# Patient Record
Sex: MALE | Race: WHITE | Employment: OTHER | URBAN - NONMETROPOLITAN AREA
[De-identification: names, ages, dates, MRNs, and addresses within clinical notes are randomized per-mention and may not be internally consistent; named-entity substitution may affect disease eponyms.]

---

## 2017-01-12 RX ORDER — DESLORATADINE 5 MG/1
TABLET ORAL
Qty: 90 TABLET | Refills: 1 | Status: SHIPPED | OUTPATIENT
Start: 2017-01-12 | End: 2017-06-06 | Stop reason: SDUPTHER

## 2017-01-13 RX ORDER — GABAPENTIN 600 MG/1
600 TABLET ORAL EVERY EVENING
Qty: 90 TABLET | Refills: 1 | Status: SHIPPED | OUTPATIENT
Start: 2017-01-13 | End: 2017-06-06 | Stop reason: SDUPTHER

## 2017-01-13 RX ORDER — FUROSEMIDE 40 MG/1
TABLET ORAL
Qty: 90 TABLET | Refills: 1 | Status: SHIPPED | OUTPATIENT
Start: 2017-01-13 | End: 2017-06-06 | Stop reason: SDUPTHER

## 2017-02-27 RX ORDER — PAROXETINE HYDROCHLORIDE 40 MG/1
TABLET, FILM COATED ORAL
Qty: 90 TABLET | Refills: 1 | Status: SHIPPED | OUTPATIENT
Start: 2017-02-27 | End: 2017-08-26 | Stop reason: SDUPTHER

## 2017-03-07 RX ORDER — KETOCONAZOLE 20 MG/ML
SHAMPOO TOPICAL
Qty: 1 BOTTLE | Refills: 5 | Status: SHIPPED | OUTPATIENT
Start: 2017-03-07 | End: 2017-08-11 | Stop reason: SDUPTHER

## 2017-03-26 DIAGNOSIS — K21.9 GASTROESOPHAGEAL REFLUX DISEASE WITHOUT ESOPHAGITIS: ICD-10-CM

## 2017-03-27 RX ORDER — PANTOPRAZOLE SODIUM 40 MG/1
TABLET, DELAYED RELEASE ORAL
Qty: 180 TABLET | Refills: 0 | Status: SHIPPED | OUTPATIENT
Start: 2017-03-27 | End: 2017-06-06 | Stop reason: SDUPTHER

## 2017-05-10 RX ORDER — LISINOPRIL 2.5 MG/1
2.5 TABLET ORAL DAILY
Qty: 90 TABLET | Refills: 1 | Status: SHIPPED | OUTPATIENT
Start: 2017-05-10 | End: 2017-08-14 | Stop reason: SDUPTHER

## 2017-06-06 ENCOUNTER — OFFICE VISIT (OUTPATIENT)
Dept: FAMILY MEDICINE CLINIC | Age: 77
End: 2017-06-06
Payer: MEDICARE

## 2017-06-06 VITALS
DIASTOLIC BLOOD PRESSURE: 60 MMHG | HEART RATE: 76 BPM | HEIGHT: 71 IN | WEIGHT: 270 LBS | BODY MASS INDEX: 37.8 KG/M2 | SYSTOLIC BLOOD PRESSURE: 110 MMHG | RESPIRATION RATE: 16 BRPM

## 2017-06-06 DIAGNOSIS — N18.30 TYPE 2 DIABETES MELLITUS WITH STAGE 3 CHRONIC KIDNEY DISEASE, WITHOUT LONG-TERM CURRENT USE OF INSULIN (HCC): ICD-10-CM

## 2017-06-06 DIAGNOSIS — N40.0 BENIGN NON-NODULAR PROSTATIC HYPERPLASIA WITHOUT LOWER URINARY TRACT SYMPTOMS: ICD-10-CM

## 2017-06-06 DIAGNOSIS — N18.30 CHRONIC KIDNEY DISEASE (CKD), STAGE III (MODERATE) (HCC): ICD-10-CM

## 2017-06-06 DIAGNOSIS — G63 POLYNEUROPATHY ASSOCIATED WITH UNDERLYING DISEASE (HCC): ICD-10-CM

## 2017-06-06 DIAGNOSIS — F41.9 ANXIETY: ICD-10-CM

## 2017-06-06 DIAGNOSIS — I42.0 DILATED CARDIOMYOPATHY (HCC): ICD-10-CM

## 2017-06-06 DIAGNOSIS — I10 ESSENTIAL HYPERTENSION: ICD-10-CM

## 2017-06-06 DIAGNOSIS — E11.22 TYPE 2 DIABETES MELLITUS WITH STAGE 3 CHRONIC KIDNEY DISEASE, WITHOUT LONG-TERM CURRENT USE OF INSULIN (HCC): ICD-10-CM

## 2017-06-06 DIAGNOSIS — K21.9 GASTROESOPHAGEAL REFLUX DISEASE WITHOUT ESOPHAGITIS: Primary | ICD-10-CM

## 2017-06-06 DIAGNOSIS — F32.A DEPRESSION, UNSPECIFIED DEPRESSION TYPE: ICD-10-CM

## 2017-06-06 PROCEDURE — 4040F PNEUMOC VAC/ADMIN/RCVD: CPT | Performed by: FAMILY MEDICINE

## 2017-06-06 PROCEDURE — 1123F ACP DISCUSS/DSCN MKR DOCD: CPT | Performed by: FAMILY MEDICINE

## 2017-06-06 PROCEDURE — G8417 CALC BMI ABV UP PARAM F/U: HCPCS | Performed by: FAMILY MEDICINE

## 2017-06-06 PROCEDURE — 1036F TOBACCO NON-USER: CPT | Performed by: FAMILY MEDICINE

## 2017-06-06 PROCEDURE — 99214 OFFICE O/P EST MOD 30 MIN: CPT | Performed by: FAMILY MEDICINE

## 2017-06-06 PROCEDURE — G8427 DOCREV CUR MEDS BY ELIG CLIN: HCPCS | Performed by: FAMILY MEDICINE

## 2017-06-06 PROCEDURE — G8598 ASA/ANTIPLAT THER USED: HCPCS | Performed by: FAMILY MEDICINE

## 2017-06-06 RX ORDER — GABAPENTIN 600 MG/1
600 TABLET ORAL EVERY EVENING
Qty: 90 TABLET | Refills: 1 | Status: SHIPPED | OUTPATIENT
Start: 2017-06-06 | End: 2018-02-06 | Stop reason: SDUPTHER

## 2017-06-06 RX ORDER — DESLORATADINE 5 MG/1
TABLET ORAL
Qty: 90 TABLET | Refills: 1 | Status: SHIPPED | OUTPATIENT
Start: 2017-06-06 | End: 2017-12-16 | Stop reason: SDUPTHER

## 2017-06-06 RX ORDER — FUROSEMIDE 40 MG/1
TABLET ORAL
Qty: 90 TABLET | Refills: 1 | Status: SHIPPED | OUTPATIENT
Start: 2017-06-06 | End: 2018-01-24 | Stop reason: SDUPTHER

## 2017-06-06 RX ORDER — PANTOPRAZOLE SODIUM 40 MG/1
TABLET, DELAYED RELEASE ORAL
Qty: 180 TABLET | Refills: 0 | Status: SHIPPED | OUTPATIENT
Start: 2017-06-06 | End: 2017-09-05 | Stop reason: SDUPTHER

## 2017-06-06 RX ORDER — HYDROCODONE BITARTRATE AND ACETAMINOPHEN 5; 325 MG/1; MG/1
TABLET ORAL
Qty: 90 TABLET | Refills: 0 | Status: SHIPPED | OUTPATIENT
Start: 2017-06-06 | End: 2017-12-06 | Stop reason: SDUPTHER

## 2017-06-06 RX ORDER — SOTALOL HYDROCHLORIDE 80 MG/1
TABLET ORAL
Qty: 180 TABLET | Refills: 1 | Status: SHIPPED | OUTPATIENT
Start: 2017-06-06 | End: 2017-12-04 | Stop reason: SDUPTHER

## 2017-06-06 ASSESSMENT — ENCOUNTER SYMPTOMS
ABDOMINAL PAIN: 0
SINUS PRESSURE: 0
RHINORRHEA: 0
EYE REDNESS: 0
EYE DISCHARGE: 0
DIARRHEA: 0
CONSTIPATION: 0
VOMITING: 0
SHORTNESS OF BREATH: 0
SORE THROAT: 0
TROUBLE SWALLOWING: 0
COUGH: 0
WHEEZING: 0
NAUSEA: 0

## 2017-06-06 ASSESSMENT — PATIENT HEALTH QUESTIONNAIRE - PHQ9
SUM OF ALL RESPONSES TO PHQ9 QUESTIONS 1 & 2: 0
1. LITTLE INTEREST OR PLEASURE IN DOING THINGS: 0
SUM OF ALL RESPONSES TO PHQ QUESTIONS 1-9: 0
2. FEELING DOWN, DEPRESSED OR HOPELESS: 0

## 2017-06-07 RX ORDER — GABAPENTIN 300 MG/1
300 CAPSULE ORAL
Qty: 90 CAPSULE | Refills: 0 | Status: SHIPPED | OUTPATIENT
Start: 2017-06-07 | End: 2017-10-18 | Stop reason: SDUPTHER

## 2017-06-13 ENCOUNTER — TELEPHONE (OUTPATIENT)
Dept: FAMILY MEDICINE CLINIC | Age: 77
End: 2017-06-13

## 2017-06-14 ENCOUNTER — OFFICE VISIT (OUTPATIENT)
Dept: UROLOGY | Age: 77
End: 2017-06-14
Payer: MEDICARE

## 2017-06-14 VITALS
DIASTOLIC BLOOD PRESSURE: 60 MMHG | WEIGHT: 267 LBS | BODY MASS INDEX: 37.38 KG/M2 | HEIGHT: 71 IN | SYSTOLIC BLOOD PRESSURE: 110 MMHG | HEART RATE: 80 BPM

## 2017-06-14 DIAGNOSIS — R97.20 ABNORMAL PSA: Primary | ICD-10-CM

## 2017-06-14 PROCEDURE — 4040F PNEUMOC VAC/ADMIN/RCVD: CPT | Performed by: UROLOGY

## 2017-06-14 PROCEDURE — 99213 OFFICE O/P EST LOW 20 MIN: CPT | Performed by: UROLOGY

## 2017-06-14 PROCEDURE — G8598 ASA/ANTIPLAT THER USED: HCPCS | Performed by: UROLOGY

## 2017-06-14 PROCEDURE — G8427 DOCREV CUR MEDS BY ELIG CLIN: HCPCS | Performed by: UROLOGY

## 2017-06-14 PROCEDURE — G8417 CALC BMI ABV UP PARAM F/U: HCPCS | Performed by: UROLOGY

## 2017-06-14 PROCEDURE — 1036F TOBACCO NON-USER: CPT | Performed by: UROLOGY

## 2017-06-14 PROCEDURE — 1123F ACP DISCUSS/DSCN MKR DOCD: CPT | Performed by: UROLOGY

## 2017-08-11 RX ORDER — KETOCONAZOLE 20 MG/ML
SHAMPOO TOPICAL
Qty: 1 BOTTLE | Refills: 5 | Status: SHIPPED | OUTPATIENT
Start: 2017-08-11 | End: 2018-11-16

## 2017-08-14 RX ORDER — LISINOPRIL 2.5 MG/1
2.5 TABLET ORAL DAILY
Qty: 30 TABLET | Refills: 0 | Status: SHIPPED | OUTPATIENT
Start: 2017-08-14 | End: 2017-10-24

## 2017-08-28 RX ORDER — PAROXETINE HYDROCHLORIDE 40 MG/1
TABLET, FILM COATED ORAL
Qty: 90 TABLET | Refills: 1 | Status: SHIPPED | OUTPATIENT
Start: 2017-08-28 | End: 2018-02-24 | Stop reason: SDUPTHER

## 2017-09-05 DIAGNOSIS — K21.9 GASTROESOPHAGEAL REFLUX DISEASE WITHOUT ESOPHAGITIS: ICD-10-CM

## 2017-09-05 RX ORDER — PANTOPRAZOLE SODIUM 40 MG/1
TABLET, DELAYED RELEASE ORAL
Qty: 180 TABLET | Refills: 1 | Status: SHIPPED | OUTPATIENT
Start: 2017-09-05 | End: 2018-03-04 | Stop reason: SDUPTHER

## 2017-10-18 RX ORDER — GABAPENTIN 300 MG/1
300 CAPSULE ORAL
Qty: 90 CAPSULE | Refills: 0 | Status: SHIPPED | OUTPATIENT
Start: 2017-10-18 | End: 2018-02-06 | Stop reason: SDUPTHER

## 2017-10-24 RX ORDER — LISINOPRIL 2.5 MG/1
TABLET ORAL
Qty: 90 TABLET | Refills: 1 | Status: SHIPPED | OUTPATIENT
Start: 2017-10-24 | End: 2018-04-22 | Stop reason: SDUPTHER

## 2017-12-04 ENCOUNTER — HOSPITAL ENCOUNTER (OUTPATIENT)
Dept: LAB | Age: 77
Setting detail: SPECIMEN
Discharge: HOME | End: 2017-12-04
Payer: MEDICARE

## 2017-12-04 DIAGNOSIS — N18.30 TYPE 2 DIABETES MELLITUS WITH STAGE 3 CHRONIC KIDNEY DISEASE, WITHOUT LONG-TERM CURRENT USE OF INSULIN (HCC): ICD-10-CM

## 2017-12-04 DIAGNOSIS — R97.20 ABNORMAL PSA: ICD-10-CM

## 2017-12-04 DIAGNOSIS — N40.0 BENIGN NON-NODULAR PROSTATIC HYPERPLASIA WITHOUT LOWER URINARY TRACT SYMPTOMS: ICD-10-CM

## 2017-12-04 DIAGNOSIS — E11.22 TYPE 2 DIABETES MELLITUS WITH STAGE 3 CHRONIC KIDNEY DISEASE, WITHOUT LONG-TERM CURRENT USE OF INSULIN (HCC): ICD-10-CM

## 2017-12-04 DIAGNOSIS — R97.20 ELEVATED PSA: Primary | ICD-10-CM

## 2017-12-04 LAB
ABSOLUTE EOS #: 0.4 K/UL (ref 0–0.4)
ABSOLUTE IMMATURE GRANULOCYTE: ABNORMAL K/UL (ref 0–0.3)
ABSOLUTE LYMPH #: 0.9 K/UL (ref 1–4.8)
ABSOLUTE MONO #: 0.4 K/UL (ref 0.1–1.2)
ALBUMIN SERPL-MCNC: 4 G/DL (ref 3.5–5.2)
ALBUMIN/GLOBULIN RATIO: 1.6 (ref 1–2.5)
ALP BLD-CCNC: 99 U/L (ref 40–129)
ALT SERPL-CCNC: 22 U/L (ref 5–41)
ANION GAP SERPL CALCULATED.3IONS-SCNC: 13 MMOL/L (ref 9–17)
AST SERPL-CCNC: 21 U/L
BASOPHILS # BLD: 1 % (ref 0–2)
BASOPHILS ABSOLUTE: 0.1 K/UL (ref 0–0.2)
BILIRUB SERPL-MCNC: 0.35 MG/DL (ref 0.3–1.2)
BUN BLDV-MCNC: 23 MG/DL (ref 8–23)
BUN/CREAT BLD: 16 (ref 9–20)
CALCIUM SERPL-MCNC: 8.1 MG/DL (ref 8.6–10.4)
CHLORIDE BLD-SCNC: 108 MMOL/L (ref 98–107)
CHOLESTEROL/HDL RATIO: 4.3
CHOLESTEROL: 113 MG/DL
CO2: 24 MMOL/L (ref 20–31)
CREAT SERPL-MCNC: 1.4 MG/DL (ref 0.7–1.2)
DIFFERENTIAL TYPE: ABNORMAL
EOSINOPHILS RELATIVE PERCENT: 6 % (ref 1–8)
ESTIMATED AVERAGE GLUCOSE: 146 MG/DL
GFR AFRICAN AMERICAN: 59 ML/MIN
GFR NON-AFRICAN AMERICAN: 49 ML/MIN
GFR SERPL CREATININE-BSD FRML MDRD: ABNORMAL ML/MIN/{1.73_M2}
GFR SERPL CREATININE-BSD FRML MDRD: ABNORMAL ML/MIN/{1.73_M2}
GLUCOSE BLD-MCNC: 150 MG/DL (ref 70–99)
HBA1C MFR BLD: 6.7 % (ref 4.8–5.9)
HCT VFR BLD CALC: 39.2 % (ref 41–53)
HDLC SERPL-MCNC: 26 MG/DL
HEMOGLOBIN: 12.7 G/DL (ref 13.5–17.5)
IMMATURE GRANULOCYTES: ABNORMAL %
LDL CHOLESTEROL: 65 MG/DL (ref 0–130)
LYMPHOCYTES # BLD: 12 % (ref 15–43)
MCH RBC QN AUTO: 30.5 PG (ref 26–34)
MCHC RBC AUTO-ENTMCNC: 32.5 G/DL (ref 31–37)
MCV RBC AUTO: 93.8 FL (ref 80–100)
MONOCYTES # BLD: 6 % (ref 6–14)
PDW BLD-RTO: 14.4 % (ref 11–14.5)
PLATELET # BLD: 156 K/UL (ref 140–450)
PLATELET ESTIMATE: ABNORMAL
PMV BLD AUTO: 8.4 FL (ref 6–12)
POTASSIUM SERPL-SCNC: 4.2 MMOL/L (ref 3.7–5.3)
PROSTATE SPECIFIC ANTIGEN: 6.01 UG/L
RBC # BLD: 4.18 M/UL (ref 4.5–5.9)
RBC # BLD: ABNORMAL 10*6/UL
SEG NEUTROPHILS: 75 % (ref 44–74)
SEGMENTED NEUTROPHILS ABSOLUTE COUNT: 5.2 K/UL (ref 1.8–7.7)
SODIUM BLD-SCNC: 145 MMOL/L (ref 135–144)
TOTAL PROTEIN: 6.5 G/DL (ref 6.4–8.3)
TRIGL SERPL-MCNC: 108 MG/DL
VLDLC SERPL CALC-MCNC: ABNORMAL MG/DL (ref 1–30)
WBC # BLD: 7 K/UL (ref 3.5–11)
WBC # BLD: ABNORMAL 10*3/UL

## 2017-12-04 PROCEDURE — 80061 LIPID PANEL: CPT

## 2017-12-04 PROCEDURE — 83036 HEMOGLOBIN GLYCOSYLATED A1C: CPT

## 2017-12-04 PROCEDURE — 36415 COLL VENOUS BLD VENIPUNCTURE: CPT

## 2017-12-04 PROCEDURE — 85025 COMPLETE CBC W/AUTO DIFF WBC: CPT

## 2017-12-04 PROCEDURE — 84153 ASSAY OF PSA TOTAL: CPT

## 2017-12-04 PROCEDURE — 80053 COMPREHEN METABOLIC PANEL: CPT

## 2017-12-04 RX ORDER — SOTALOL HYDROCHLORIDE 80 MG/1
TABLET ORAL
Qty: 180 TABLET | Refills: 1 | Status: SHIPPED | OUTPATIENT
Start: 2017-12-04 | End: 2018-06-15 | Stop reason: SDUPTHER

## 2017-12-05 NOTE — TELEPHONE ENCOUNTER
Ciera Lenz called requesting a refill of the below medication which has been pended for you:     Requested Prescriptions     Pending Prescriptions Disp Refills    metoprolol tartrate (LOPRESSOR) 25 MG tablet [Pharmacy Med Name: METOPROLOL TARTRATE TABS 25MG] 180 tablet 1     Sig: TAKE 1 TABLET TWICE A DAY       Last Appointment Date: 6/6/2017  Next Appointment Date: 12/6/2017    No Known Allergies

## 2017-12-06 ENCOUNTER — OFFICE VISIT (OUTPATIENT)
Dept: FAMILY MEDICINE CLINIC | Age: 77
End: 2017-12-06
Payer: MEDICARE

## 2017-12-06 ENCOUNTER — HOSPITAL ENCOUNTER (OUTPATIENT)
Dept: LAB | Age: 77
Setting detail: SPECIMEN
Discharge: HOME | End: 2017-12-06
Payer: MEDICARE

## 2017-12-06 VITALS
WEIGHT: 273 LBS | OXYGEN SATURATION: 95 % | DIASTOLIC BLOOD PRESSURE: 60 MMHG | HEIGHT: 71 IN | SYSTOLIC BLOOD PRESSURE: 100 MMHG | BODY MASS INDEX: 38.22 KG/M2 | HEART RATE: 80 BPM | RESPIRATION RATE: 18 BRPM

## 2017-12-06 DIAGNOSIS — F41.9 ANXIETY: ICD-10-CM

## 2017-12-06 DIAGNOSIS — E78.5 DYSLIPIDEMIA: ICD-10-CM

## 2017-12-06 DIAGNOSIS — N18.30 STAGE 3 CHRONIC KIDNEY DISEASE (HCC): ICD-10-CM

## 2017-12-06 DIAGNOSIS — E11.22 TYPE 2 DIABETES MELLITUS WITH STAGE 3 CHRONIC KIDNEY DISEASE, WITHOUT LONG-TERM CURRENT USE OF INSULIN (HCC): Primary | ICD-10-CM

## 2017-12-06 DIAGNOSIS — R97.20 ELEVATED PSA: ICD-10-CM

## 2017-12-06 DIAGNOSIS — F32.A DEPRESSION, UNSPECIFIED DEPRESSION TYPE: ICD-10-CM

## 2017-12-06 DIAGNOSIS — I42.0 DILATED CARDIOMYOPATHY (HCC): ICD-10-CM

## 2017-12-06 DIAGNOSIS — I10 ESSENTIAL HYPERTENSION: ICD-10-CM

## 2017-12-06 DIAGNOSIS — Z00.00 MEDICARE ANNUAL WELLNESS VISIT, SUBSEQUENT: ICD-10-CM

## 2017-12-06 DIAGNOSIS — N18.30 TYPE 2 DIABETES MELLITUS WITH STAGE 3 CHRONIC KIDNEY DISEASE, WITHOUT LONG-TERM CURRENT USE OF INSULIN (HCC): Primary | ICD-10-CM

## 2017-12-06 PROCEDURE — 84154 ASSAY OF PSA FREE: CPT

## 2017-12-06 PROCEDURE — G8417 CALC BMI ABV UP PARAM F/U: HCPCS | Performed by: FAMILY MEDICINE

## 2017-12-06 PROCEDURE — G8484 FLU IMMUNIZE NO ADMIN: HCPCS | Performed by: FAMILY MEDICINE

## 2017-12-06 PROCEDURE — G8427 DOCREV CUR MEDS BY ELIG CLIN: HCPCS | Performed by: FAMILY MEDICINE

## 2017-12-06 PROCEDURE — 1036F TOBACCO NON-USER: CPT | Performed by: FAMILY MEDICINE

## 2017-12-06 PROCEDURE — G8598 ASA/ANTIPLAT THER USED: HCPCS | Performed by: FAMILY MEDICINE

## 2017-12-06 PROCEDURE — G0439 PPPS, SUBSEQ VISIT: HCPCS | Performed by: FAMILY MEDICINE

## 2017-12-06 PROCEDURE — 4040F PNEUMOC VAC/ADMIN/RCVD: CPT | Performed by: FAMILY MEDICINE

## 2017-12-06 PROCEDURE — 99214 OFFICE O/P EST MOD 30 MIN: CPT | Performed by: FAMILY MEDICINE

## 2017-12-06 PROCEDURE — 36415 COLL VENOUS BLD VENIPUNCTURE: CPT

## 2017-12-06 PROCEDURE — 1123F ACP DISCUSS/DSCN MKR DOCD: CPT | Performed by: FAMILY MEDICINE

## 2017-12-06 RX ORDER — HYDROCODONE BITARTRATE AND ACETAMINOPHEN 5; 325 MG/1; MG/1
TABLET ORAL
Qty: 90 TABLET | Refills: 0 | Status: SHIPPED | OUTPATIENT
Start: 2017-12-06 | End: 2018-06-15 | Stop reason: SDUPTHER

## 2017-12-06 ASSESSMENT — PATIENT HEALTH QUESTIONNAIRE - PHQ9
1. LITTLE INTEREST OR PLEASURE IN DOING THINGS: 0
SUM OF ALL RESPONSES TO PHQ9 QUESTIONS 1 & 2: 0
2. FEELING DOWN, DEPRESSED OR HOPELESS: 0
SUM OF ALL RESPONSES TO PHQ QUESTIONS 1-9: 0

## 2017-12-06 NOTE — PATIENT INSTRUCTIONS
12/04/2017 24  20 - 31 mmol/L Final    Anion Gap 12/04/2017 13  9 - 17 mmol/L Final    Alkaline Phosphatase 12/04/2017 99  40 - 129 U/L Final    ALT 12/04/2017 22  5 - 41 U/L Final    AST 12/04/2017 21  <40 U/L Final    Total Bilirubin 12/04/2017 0.35  0.3 - 1.2 mg/dL Final    Total Protein 12/04/2017 6.5  6.4 - 8.3 g/dL Final    Alb 12/04/2017 4.0  3.5 - 5.2 g/dL Final    Albumin/Globulin Ratio 12/04/2017 1.6  1.0 - 2.5 Final    GFR Non- 12/04/2017 49* >60 mL/min Final    GFR  12/04/2017 59* >60 mL/min Final    GFR Comment 12/04/2017        Final    Comment: Average GFR for 79or more years old:   76 mL/min/1.73sq m  Chronic Kidney Disease:   <60 mL/min/1.73sq m  Kidney failure:   <15 mL/min/1.73sq m              eGFR calculated using average adult body mass. Additional eGFR calculator   available at:        [email or web address]        Performed at [facility] [address] [facility] [address] Tamela Ivey [phone]      GFR Staging 12/04/2017 NOT REPORTED   Final    Hemoglobin A1C 12/04/2017 6.7* 4.8 - 5.9 % Final    Estimated Avg Glucose 12/04/2017 146  mg/dL Final    Comment: Performed at [facility] [address] [id number] [phone]      Cholesterol 12/04/2017 113  <200 mg/dL Final    Comment:    Cholesterol Guidelines:      <200  Desirable   200-240  Borderline      >240  Undesirable         HDL 12/04/2017 26* >40 mg/dL Final    Comment:    HDL Guidelines:    <40     Undesirable   40-59    Borderline    >59     Desirable         LDL Cholesterol 12/04/2017 65  0 - 130 mg/dL Final    Comment:    LDL Guidelines:     <100    Desirable   100-129   Near to/above Desirable   130-159   Borderline      >159   Undesirable     Direct (measured) LDL and calculated LDL are not interchangeable tests.       Chol/HDL Ratio 12/04/2017 4.3  <5 Final   

## 2017-12-07 LAB
PROSTATE SPECIFIC ANTIGEN FREE: 2.3 UG/L
PROSTATE SPECIFIC ANTIGEN PERCENT FREE: 52.3 %
PROSTATE SPECIFIC ANTIGEN: 4.4 UG/L (ref 0–4)

## 2017-12-13 PROBLEM — E78.5 DYSLIPIDEMIA: Status: ACTIVE | Noted: 2017-12-13

## 2017-12-13 ASSESSMENT — ENCOUNTER SYMPTOMS
DIARRHEA: 0
SORE THROAT: 0
SHORTNESS OF BREATH: 0
EYE DISCHARGE: 0
NAUSEA: 0
ABDOMINAL PAIN: 0
WHEEZING: 0
TROUBLE SWALLOWING: 0
RHINORRHEA: 0
SINUS PRESSURE: 0
VOMITING: 0
EYE REDNESS: 0
CONSTIPATION: 0
COUGH: 0
BACK PAIN: 1

## 2017-12-13 NOTE — PROGRESS NOTES
Subjective:      Patient ID: Morris Marti is a 65 y.o. male. HPI  Patient comes in today for follow up of chronic health issues   Chief Complaint   Patient presents with    Medicare AWV     subsequent; last 12/2/16    Diabetes     6 mo f/u    Hypertension     6 mo f/u    Hyperlipidemia     6 mo f/u    Chronic Kidney Disease     6 mo f/u    Anemia     6 mo f/u    Elevated PSA     6 mo f/u    Discuss Labs     drawn 12/4/17    Shortness of Breath     increasing with exertion   . Patient Has been under increased situational stress secondary to ongoing health concerns with his wife. Seems be coping with it relatively well. With regards to his chronic health issues he does have known diabetes mellitus type 2 which is stable and controlled without medical therapy and dietary efforts. I did make note however that his hemoglobin A1c continues to climb. He does need to follow a low-carb/low sugar diet and increase exercise keep this optimally controlled. If I see persistent elevations in his blood sugar level then we will need to add further medical therapy. Does have a known history of hypertension which is stable and controlled on his current medical therapy. His hyperlipidemia is stable and controlled with dietary efforts. He does have a low HDL so I did encourage him to increase his exercise in order to keep this optimally controlled. His depression and anxiety are stable and controlled on his current dose of Paxil. Does have a known history of chronic kidney disease and his creatinine remains stable at this time. Has chronically elevated PSA. He had previously seen Dr. Bert Bardales who was monitoring at this time. He does note that he has had increased shortness of breath with exertion. Is aware that he does have known cardiomyopathy with decreased cardiac function. Suspects is related to that.   I did recommend that he follow-up with the cardiologist for further evaluation and he states is been quite some time since he is seen the cardiologist.  At this time he did not really seem interested in following up with the cardiologist as he did not feel that there was anything else interventional that he could do. He did not want to pursue cardiac testing or further cardiac intervention. Is aware that there could be an underlying cardiac issue contributing to his symptoms that goes undiagnosed and untreated if he does not have further evaluation by the cardiologist.  Otherwise today no other acute medical concerns. .  Patient's recent lab reports are as follows:    Results for orders placed or performed during the hospital encounter of 12/06/17   PSA, Free   Result Value Ref Range    PSA 4.4 (H) 0.0 - 4.0 ug/L    PSA, Free 2.3 ug/L    PSA, Free Pct 52.3 %     Lab Results   Component Value Date    WBC 7.0 12/04/2017    RBC 4.18 12/04/2017    HGB 12.7 12/04/2017    HCT 39.2 12/04/2017    MCV 93.8 12/04/2017    MCH 30.5 12/04/2017    MCHC 32.5 12/04/2017    RDW 14.4 12/04/2017     12/04/2017    MPV 8.4 12/04/2017       Lab Results   Component Value Date    CHOL 113 12/04/2017    CHOL 94 04/15/2015    HDL 26 12/04/2017    CHOLHDLRATIO 4.3 12/04/2017    TRIG 108 12/04/2017    VLDL NOT REPORTED 12/04/2017       Lab Results   Component Value Date    TSH 4.25 05/31/2016       Lab Results   Component Value Date     12/04/2017    K 4.2 12/04/2017     12/04/2017    CO2 24 12/04/2017    BUN 23 12/04/2017    CREATININE 1.40 12/04/2017    GLUCOSE 150 12/04/2017    CALCIUM 8.1 12/04/2017       Lab Results   Component Value Date    LABA1C 6.7 12/04/2017         Did discuss dietary modification and increased exercise to keep cholesterol and blood sugar under good control. Other review of systems are as noted below.     Medicare Annual Wellness Visit       Sunni Mancuso  YOB: 1940    Date of Service:  12/6/2017    Patient Active Problem List   Diagnosis    Dilated cardiomyopathy ([facility] [address]    Former Smoker    Types: Cigarettes   Smokeless Tobacco    Never Used     Guillermina YING Kidd 1/18/16     History   Alcohol Use No       HRA is completed and reviewed today. See scanned HRA for review. Consultants:   Patient Care Team:  Jared Stevenson DO as PCP - General (Family Medicine)  Jared Stevenson DO as PCP - [facility] Attributed Provider  Chip Smart MD (General Surgery)  Dr. Yesenia Salvador, Urology  Dr. Nevin Selby Cardiology    Fall Risk Assessment  Not at risk for falls. Hearing Assessment bilateral-  Diminished, patient declined referral for audiology evaluation for hearing aids  Functional Assessment Patient is independent with mobility/ambulation, transfers, ADL's, IADL's. Does have assistance for laundry, housekeeping, finances, shopping, food preparation and setting up medications. Daughters help with these tasks. Cognitive Assessment Orientation to: oriented to person, place, and time, remote and recent memory is intact. No significant cognitive deficits are noted. There is no change in cognitive status in the last year duration. Wt Readings from Last 3 Encounters:   12/06/17 273 lb (123.8 kg)   06/14/17 267 lb (121.1 kg)   06/06/17 270 lb (122.5 kg)     BP Readings from Last 3 Encounters:   12/06/17 100/60   06/14/17 110/60   06/06/17 110/60         Current Health Maintenance Status  Health Maintenance   Topic Date Due    Pneumococcal low/med risk (2 of 2 - PCV13) . Recommended, patient declined      Flu vaccine (1) Recommended, patient declined      Diabetic hemoglobin A1C test  06/04/2018    Diabetic retinal exam  10/17/2018    Lipid screen  12/04/2018    Diabetic foot exam  12/06/2018    DTaP/Tdap/Td vaccine (2 - Td) 06/02/2026    Zostavax vaccine  Completed         Personalized Preventive Plan   This plan is provided to the patient in written form. Review of Systems   Constitutional: Negative for chills, fatigue and fever.    HENT: Negative for congestion, ear pain, dry. No rash noted. He is not diaphoretic. Psychiatric: He has a normal mood and affect. His behavior is normal. Judgment and thought content normal.   Nursing note and vitals reviewed. Assessment:      Encounter Diagnoses   Name Primary?  Type 2 diabetes mellitus with stage 3 chronic kidney disease, without long-term current use of insulin (HCC) Yes    Essential hypertension     Stage 3 chronic kidney disease     Dilated cardiomyopathy (HCC)     Depression, unspecified depression type     Anxiety     Dyslipidemia     Medicare annual wellness visit, subsequent              Plan:      Orders Placed This Encounter   Medications    HYDROcodone-acetaminophen (NORCO) 5-325 MG per tablet     Si/2-1 q 8hours as needed for pain dx 356.9. Dispense:  90 tablet     Refill:  0     Orders Placed This Encounter   Procedures    CBC Auto Differential     To be done in 6 months     Standing Status:   Future     Standing Expiration Date:   2018    Comprehensive Metabolic Panel     To be done in 6 months     Standing Status:   Future     Standing Expiration Date:   2018    Hemoglobin A1C     To be done in 6 months     Standing Status:   Future     Standing Expiration Date:   2018    Lipid Panel     To be done in 6 months     Standing Status:   Future     Standing Expiration Date:   2018     Order Specific Question:   Is Patient Fasting?/# of Hours     Answer:   12 hours     DIABETES FOOT EXAM    SC PPPS, SUBSEQ VISIT     Preventative measures are reviewed as noted above. Did discuss with patient following up with his cardiologist.  He is aware that he should have further Cardiologic evaluation secondary to his symptoms of increased dyspnea with exertion. Patient declined at this time however. Patient is to continue on his current medical therapy. No additional changes are made at this time.     Patient is to follow a low-carb/low sugar/low fat diet and increase exercise for

## 2017-12-14 ENCOUNTER — TELEPHONE (OUTPATIENT)
Dept: UROLOGY | Age: 77
End: 2017-12-14

## 2017-12-14 NOTE — TELEPHONE ENCOUNTER
Called and talked to patients daughter, Sherri Lou. Asked to set up an appointment with either Dr. Neno Ramos or Dr. Ema Godfrey d/t missing his appt. With Dr. Teja Cain. Daughter said she would talk with paitent and darío back. Was told that his PSA level is elevated and a follow-up is recommended.

## 2017-12-18 ENCOUNTER — TELEPHONE (OUTPATIENT)
Dept: FAMILY MEDICINE CLINIC | Age: 77
End: 2017-12-18

## 2017-12-18 RX ORDER — DESLORATADINE 5 MG/1
TABLET ORAL
Qty: 90 TABLET | Refills: 1 | Status: SHIPPED | OUTPATIENT
Start: 2017-12-18 | End: 2018-06-15 | Stop reason: SDUPTHER

## 2017-12-18 NOTE — TELEPHONE ENCOUNTER
Called Flaca back and reviewed labs with her. Advised her we do recommend he follow through with urology followup due to the climbing numbers in his PSA. She agreed and transferred her to central scheduling to book new appt.

## 2017-12-18 NOTE — TELEPHONE ENCOUNTER
Flaca calling for pt, states pt was scheduled with Dr Aileen Carias and then cancelled the appt, Dr Sandoval Mosses office called and told pt he needed to be seen due to his levels, pt wants estefanía to check his levels and see if pt can put off this appt, also questioning about new urologist that are coming to clinic and which one Chris De Leon would recommend, please advise Flaca at above number

## 2017-12-27 ENCOUNTER — TELEPHONE (OUTPATIENT)
Dept: FAMILY MEDICINE CLINIC | Age: 77
End: 2017-12-27

## 2017-12-27 RX ORDER — PREDNISONE 20 MG/1
TABLET ORAL
Qty: 10 TABLET | Refills: 0 | Status: SHIPPED | OUTPATIENT
Start: 2017-12-27 | End: 2018-06-15 | Stop reason: ALTCHOICE

## 2017-12-27 NOTE — TELEPHONE ENCOUNTER
Patient was taken to [facility] by squad early this morning for right hip/leg pain that awoke him to the point where he could not walk. Had radiology testing done at hospital with no findings. They gave him some IV morphine and thought it was sciatica pain. Patient is ok at this point by family is worried when Morphine gets out of system that pain will return. Spoke with Dr Caroline Ruiz regarding this and she stated we could send in an oral steroid to see if this helps.  We will send to [facility]

## 2017-12-28 ENCOUNTER — HOSPITAL ENCOUNTER (EMERGENCY)
Age: 77
Discharge: HOME | End: 2017-12-28
Attending: EMERGENCY MEDICINE
Payer: MEDICARE

## 2017-12-28 ENCOUNTER — APPOINTMENT (OUTPATIENT)
Dept: INTERVENTIONAL RADIOLOGY/VASCULAR | Age: 77
End: 2017-12-28
Payer: MEDICARE

## 2017-12-28 VITALS
OXYGEN SATURATION: 98 % | BODY MASS INDEX: 35.76 KG/M2 | HEART RATE: 80 BPM | RESPIRATION RATE: 14 BRPM | WEIGHT: 264 LBS | SYSTOLIC BLOOD PRESSURE: 109 MMHG | HEIGHT: 72 IN | DIASTOLIC BLOOD PRESSURE: 69 MMHG | TEMPERATURE: 97.7 F

## 2017-12-28 DIAGNOSIS — L03.317 CELLULITIS OF RIGHT BUTTOCK: Primary | ICD-10-CM

## 2017-12-28 LAB
ABSOLUTE EOS #: 0.3 K/UL (ref 0–0.4)
ABSOLUTE IMMATURE GRANULOCYTE: ABNORMAL K/UL (ref 0–0.3)
ABSOLUTE LYMPH #: 0.8 K/UL (ref 1–4.8)
ABSOLUTE MONO #: 0.3 K/UL (ref 0.1–1.2)
ANION GAP SERPL CALCULATED.3IONS-SCNC: 11 MMOL/L (ref 9–17)
BASOPHILS # BLD: 1 % (ref 0–2)
BASOPHILS ABSOLUTE: 0.1 K/UL (ref 0–0.2)
BUN BLDV-MCNC: 21 MG/DL (ref 8–23)
BUN/CREAT BLD: 15 (ref 9–20)
CALCIUM SERPL-MCNC: 8 MG/DL (ref 8.6–10.4)
CHLORIDE BLD-SCNC: 105 MMOL/L (ref 98–107)
CO2: 27 MMOL/L (ref 20–31)
CREAT SERPL-MCNC: 1.4 MG/DL (ref 0.7–1.2)
DIFFERENTIAL TYPE: ABNORMAL
EOSINOPHILS RELATIVE PERCENT: 6 % (ref 1–8)
GFR AFRICAN AMERICAN: 59 ML/MIN
GFR NON-AFRICAN AMERICAN: 49 ML/MIN
GFR SERPL CREATININE-BSD FRML MDRD: ABNORMAL ML/MIN/{1.73_M2}
GFR SERPL CREATININE-BSD FRML MDRD: ABNORMAL ML/MIN/{1.73_M2}
GLUCOSE BLD-MCNC: 172 MG/DL (ref 70–99)
HCT VFR BLD CALC: 37.3 % (ref 41–53)
HEMOGLOBIN: 12.2 G/DL (ref 13.5–17.5)
IMMATURE GRANULOCYTES: ABNORMAL %
LACTIC ACID: 1.3 MMOL/L (ref 0.5–2.2)
LYMPHOCYTES # BLD: 14 % (ref 15–43)
MCH RBC QN AUTO: 30.5 PG (ref 26–34)
MCHC RBC AUTO-ENTMCNC: 32.7 G/DL (ref 31–37)
MCV RBC AUTO: 93.2 FL (ref 80–100)
MONOCYTES # BLD: 6 % (ref 6–14)
PDW BLD-RTO: 14.2 % (ref 11–14.5)
PLATELET # BLD: 165 K/UL (ref 140–450)
PLATELET ESTIMATE: ABNORMAL
PMV BLD AUTO: 7.9 FL (ref 6–12)
POTASSIUM SERPL-SCNC: 4.4 MMOL/L (ref 3.7–5.3)
RBC # BLD: 4 M/UL (ref 4.5–5.9)
RBC # BLD: ABNORMAL 10*6/UL
SEG NEUTROPHILS: 73 % (ref 44–74)
SEGMENTED NEUTROPHILS ABSOLUTE COUNT: 4 K/UL (ref 1.8–7.7)
SODIUM BLD-SCNC: 143 MMOL/L (ref 135–144)
WBC # BLD: 5.5 K/UL (ref 3.5–11)
WBC # BLD: ABNORMAL 10*3/UL

## 2017-12-28 PROCEDURE — 87040 BLOOD CULTURE FOR BACTERIA: CPT

## 2017-12-28 PROCEDURE — 36415 COLL VENOUS BLD VENIPUNCTURE: CPT

## 2017-12-28 PROCEDURE — 83605 ASSAY OF LACTIC ACID: CPT

## 2017-12-28 PROCEDURE — 80048 BASIC METABOLIC PNL TOTAL CA: CPT

## 2017-12-28 PROCEDURE — 99284 EMERGENCY DEPT VISIT MOD MDM: CPT

## 2017-12-28 PROCEDURE — 6370000000 HC RX 637 (ALT 250 FOR IP): Performed by: EMERGENCY MEDICINE

## 2017-12-28 PROCEDURE — 87205 SMEAR GRAM STAIN: CPT

## 2017-12-28 PROCEDURE — 85025 COMPLETE CBC W/AUTO DIFF WBC: CPT

## 2017-12-28 PROCEDURE — 93971 EXTREMITY STUDY: CPT

## 2017-12-28 RX ORDER — SULFAMETHOXAZOLE AND TRIMETHOPRIM 800; 160 MG/1; MG/1
1 TABLET ORAL 2 TIMES DAILY
Qty: 20 TABLET | Refills: 0 | Status: SHIPPED | OUTPATIENT
Start: 2017-12-28 | End: 2018-01-07

## 2017-12-28 RX ORDER — CEPHALEXIN 250 MG/1
500 CAPSULE ORAL ONCE
Status: COMPLETED | OUTPATIENT
Start: 2017-12-28 | End: 2017-12-28

## 2017-12-28 RX ORDER — CEPHALEXIN 500 MG/1
500 CAPSULE ORAL 3 TIMES DAILY
Qty: 30 CAPSULE | Refills: 0 | Status: SHIPPED | OUTPATIENT
Start: 2017-12-28 | End: 2018-01-07

## 2017-12-28 RX ADMIN — CEPHALEXIN 500 MG: 250 CAPSULE ORAL at 14:49

## 2017-12-28 ASSESSMENT — ENCOUNTER SYMPTOMS
WHEEZING: 0
TROUBLE SWALLOWING: 0
BLOOD IN STOOL: 0
DIARRHEA: 0
CONSTIPATION: 0
VOMITING: 0
SORE THROAT: 0
BACK PAIN: 0
NAUSEA: 0
SHORTNESS OF BREATH: 0
ABDOMINAL PAIN: 0

## 2017-12-28 ASSESSMENT — PAIN DESCRIPTION - PAIN TYPE: TYPE: ACUTE PAIN

## 2017-12-28 ASSESSMENT — PAIN DESCRIPTION - FREQUENCY: FREQUENCY: CONTINUOUS

## 2017-12-28 ASSESSMENT — PAIN DESCRIPTION - ORIENTATION: ORIENTATION: RIGHT;POSTERIOR

## 2017-12-28 ASSESSMENT — PAIN DESCRIPTION - DESCRIPTORS: DESCRIPTORS: SHARP

## 2017-12-28 ASSESSMENT — PAIN SCALES - GENERAL
PAINLEVEL_OUTOF10: 3
PAINLEVEL_OUTOF10: 6

## 2017-12-28 ASSESSMENT — PAIN DESCRIPTION - ONSET: ONSET: ON-GOING

## 2017-12-28 ASSESSMENT — PAIN DESCRIPTION - LOCATION: LOCATION: HIP

## 2017-12-28 ASSESSMENT — PAIN DESCRIPTION - PROGRESSION: CLINICAL_PROGRESSION: NOT CHANGED

## 2017-12-28 NOTE — ED PROVIDER NOTES
[facility]  eMERGENCY dEPARTMENT eNCOUnter      Pt Name: Liz Gamble  MRN: [id number]  [place] 1940  Date of evaluation: 12/28/2017      CHIEF COMPLAINT       Chief Complaint   Patient presents with    Hip Pain     onset \"yesterday morning, and didn't do a thing to it\"         HISTORY OF PRESENT ILLNESS    Liz Gamble is a 65 y.o. male who presents Complaints of right buttock pain began yesterday, isn't had no injury it's worse if he sits on it or moves his able his hip laying down it's okay it does radiate down his legs somewhat. He was seen at [facility] emergency department had a CAT scan was told was negative he says it still hurting but no fevers no chills no difficulty with urination or defecation. There's been no weakness in the legs no bowel or bladder incontinence    REVIEW OF SYSTEMS         Review of Systems   Constitutional: Negative for chills and fever. HENT: Negative for congestion, dental problem, sore throat and trouble swallowing. Eyes: Negative for visual disturbance. Respiratory: Negative for shortness of breath and wheezing. Cardiovascular: Negative for chest pain, palpitations and leg swelling. Gastrointestinal: Negative for abdominal pain, blood in stool, constipation, diarrhea, nausea and vomiting. Genitourinary: Negative for difficulty urinating, dysuria and testicular pain. Musculoskeletal: Negative for back pain, joint swelling and neck pain. Right buttock pain   Skin: Negative for rash. Neurological: Negative for dizziness, syncope, weakness and headaches. Hematological: Negative for adenopathy. Does not bruise/bleed easily. Psychiatric/Behavioral: Negative for confusion and suicidal ideas. PAST MEDICAL HISTORY    has a past medical history of A-fib ([facility] [address] Abnormal PSA; Allergic rhinitis; Anemia; Arthritis; Benign prostatic hypertrophy; CAD (coronary artery disease);  Depression with anxiety; Dilated cardiomyopathy ([address] GERD (gastroesophageal reflux disease); History of blood transfusion; Hypertension; Insomnia; Mass; Obstructive sleep apnea; Peripheral neuropathy ([facility] [address] Renal insufficiency; Restless leg syndrome; Type II or unspecified type diabetes mellitus without mention of complication, not stated as uncontrolled; and Ventricular tachycardia ([facility] [address] SURGICAL HISTORY      has a past surgical history that includes Gastric bypass surgery (1996); Tonsillectomy; ventral hernia repair (1997); pacemaker placement (4/2009); ostate biopsy (10/28/2009); fine needle aspiration (12/13/2012); other surgical history (03/10/2014); Upper gastrointestinal endoscopy (04/24/2014); Colonoscopy (4/24/2014); and Cataract removal with implant (Bilateral, 4/2014). CURRENT MEDICATIONS       Previous Medications    ASPIRIN 81 MG TABLET    Take 81 mg by mouth daily. CALCIUM CARBONATE 600 MG TABS TABLET    Take 1 tablet by mouth daily. CARBIDOPA-LEVODOPA (SINEMET)  MG PER TABLET    Take 1 tablet by mouth daily    DESLORATADINE (CLARINEX) 5 MG TABLET    TAKE 1 TABLET DAILY    FUROSEMIDE (LASIX) 40 MG TABLET    TAKE 1 TABLET DAILY    GABAPENTIN (NEURONTIN) 300 MG CAPSULE    Take 1 capsule by mouth every morning (before breakfast)    GABAPENTIN (NEURONTIN) 600 MG TABLET    Take 1 tablet by mouth every evening    HYDROCODONE-ACETAMINOPHEN (NORCO) 5-325 MG PER TABLET    1/2-1 q 8hours as needed for pain dx 356.9. KETOCONAZOLE (NIZORAL) 2 % SHAMPOO    Lather into affected areas topically daily as needed. LISINOPRIL (PRINIVIL;ZESTRIL) 2.5 MG TABLET    TAKE 1 TABLET DAILY    METOPROLOL TARTRATE (LOPRESSOR) 25 MG TABLET    TAKE 1 TABLET TWICE A DAY    MULTIPLE VITAMINS-MINERALS (MULTIVITAMIN PO)    Take 1 tablet by mouth daily.     NYSTATIN (MYCOSTATIN) 323027 UNIT/GM CREAM    Apply topically 3 times daily    PANTOPRAZOLE (PROTONIX) 40 MG TABLET    TAKE 1 TABLET TWICE A DAY    PAROXETINE (PAXIL) 40 MG TABLET    TAKE 1 TABLET DAILY EMERGENCY DEPARTMENT COURSE:   Vitals:    Vitals:    12/28/17 1135 12/28/17 1237 12/28/17 1434   BP: 108/67 (!) 94/53 109/69   Pulse: 82 80    Resp: 15 14    Temp: 97.7 °F (36.5 °C)     TempSrc: Tympanic     SpO2: 97% 98%    Weight: 264 lb (119.7 kg)     Height: 6' (1.829 m)       -------------------------  BP: 109/69, Temp: 97.7 °F (36.5 °C), Pulse: 80, Resp: 14        Re-evaluation Notes    Resting comfortably    CRITICAL CARE:   None        CONSULTS:      PROCEDURES:  None    FINAL IMPRESSION      1. Cellulitis of right buttock          DISPOSITION/PLAN   DISPOSITION Discharge to home    Condition on Disposition    Stable    PATIENT REFERRED TO:  Jhony Edouard   [address] [place] [address] Road [id number] [phone]    Schedule an appointment as soon as possible for a visit in 3 days        DISCHARGE MEDICATIONS:  New Prescriptions    CEPHALEXIN (KEFLEX) 500 MG CAPSULE    Take 1 capsule by mouth 3 times daily for 10 days    SULFAMETHOXAZOLE-TRIMETHOPRIM (BACTRIM DS) 800-160 MG PER TABLET    Take 1 tablet by mouth 2 times daily for 10 days       (Please note that portions of this note were completed with a voice recognition program.  Efforts were made to edit the dictations but occasionally words are mis-transcribed.)    Siddiqui MD, F.A.A.E.M.   Attending Emergency Physician                            Sami Dean MD  12/28/17 [id number]

## 2017-12-31 LAB
CULTURE: ABNORMAL
Lab: ABNORMAL
SPECIMEN DESCRIPTION: ABNORMAL
SPECIMEN DESCRIPTION: ABNORMAL
STATUS: ABNORMAL

## 2018-01-03 LAB
CULTURE: NORMAL
CULTURE: NORMAL
Lab: NORMAL
SPECIMEN DESCRIPTION: NORMAL
SPECIMEN DESCRIPTION: NORMAL
STATUS: NORMAL

## 2018-01-04 ENCOUNTER — TELEPHONE (OUTPATIENT)
Dept: FAMILY MEDICINE CLINIC | Age: 78
End: 2018-01-04

## 2018-01-10 ENCOUNTER — OFFICE VISIT (OUTPATIENT)
Dept: UROLOGY | Age: 78
End: 2018-01-10
Payer: MEDICARE

## 2018-01-10 VITALS
WEIGHT: 263.89 LBS | SYSTOLIC BLOOD PRESSURE: 110 MMHG | BODY MASS INDEX: 35.74 KG/M2 | HEIGHT: 72 IN | DIASTOLIC BLOOD PRESSURE: 60 MMHG | HEART RATE: 80 BPM

## 2018-01-10 DIAGNOSIS — R35.0 URINARY FREQUENCY: ICD-10-CM

## 2018-01-10 DIAGNOSIS — R35.1 NOCTURIA: ICD-10-CM

## 2018-01-10 DIAGNOSIS — N40.0 ENLARGED PROSTATE: Primary | ICD-10-CM

## 2018-01-10 PROCEDURE — G8598 ASA/ANTIPLAT THER USED: HCPCS | Performed by: UROLOGY

## 2018-01-10 PROCEDURE — 99215 OFFICE O/P EST HI 40 MIN: CPT | Performed by: UROLOGY

## 2018-01-10 PROCEDURE — 1123F ACP DISCUSS/DSCN MKR DOCD: CPT | Performed by: UROLOGY

## 2018-01-10 PROCEDURE — G8427 DOCREV CUR MEDS BY ELIG CLIN: HCPCS | Performed by: UROLOGY

## 2018-01-10 PROCEDURE — 1036F TOBACCO NON-USER: CPT | Performed by: UROLOGY

## 2018-01-10 PROCEDURE — G8484 FLU IMMUNIZE NO ADMIN: HCPCS | Performed by: UROLOGY

## 2018-01-10 PROCEDURE — 4040F PNEUMOC VAC/ADMIN/RCVD: CPT | Performed by: UROLOGY

## 2018-01-10 PROCEDURE — G8417 CALC BMI ABV UP PARAM F/U: HCPCS | Performed by: UROLOGY

## 2018-01-10 NOTE — PROGRESS NOTES
Christian Acosta MD   Urology Clinic Consultation / New Patient Visit      Patient:  Mckenzie Kauffman  YOB: 1940  Date: 1/10/2018    HISTORY OF PRESENT ILLNESS:   The patient is a 65 y.o. male who presents today for evaluation of the following problem(s): enlarged prostate  Overall the problem(s) : show no change. Associated Symptoms: No dysuria, gross hematuria. Pain Severity:      Summary of old records: Dr Marco Clancy did bx years ago- negative    (Patient's old records, notes and chart reviewed and summarized above.)  Non bothersome LUTS  No prev stones  Seen in the ER recently for sciatic, seen enlarged prostate on CT    I independently reviewed and verified the images and reports from:    CT A/P 12/27/2017- enlarged prostate with possible median lobe, reported as \"prostate mass\"      Last several PSA's:  Lab Results   Component Value Date    PSA 4.4 (H) 12/06/2017    PSA 6.01 (H) 12/04/2017    PSA 5.77 (H) 06/06/2017       Last total testosterone:  No results found for: TESTOSTERONE    Urinalysis today:  No results found for this visit on 01/10/18. Last BUN and creatinine:  Lab Results   Component Value Date    BUN 21 12/28/2017     Lab Results   Component Value Date    CREATININE 1.40 (H) 12/28/2017       Imaging Reviewed during this Office Visit:   (results were independently reviewed by physician and radiology report verified)    PAST MEDICAL, FAMILY AND SOCIAL HISTORY:  Past Medical History:   Diagnosis Date    A-fib ([address] 12/2/15    Dr Princess Mixon    Abnormal PSA     Managed by Dr. He Mayberry.  Allergic rhinitis     Anemia     Arthritis     Benign prostatic hypertrophy     CAD (coronary artery disease)     Depression with anxiety     Dilated cardiomyopathy (HCC)     Severe, with ejection fraction 20 to 25%. Most recent echocardiogram 12/9/2008, mild to moderate aortic insufficiency, mild mitral and tricuspid insufficiency.     GERD (gastroesophageal reflux disease)     TABLET DAILY 90 tablet 1    gabapentin (NEURONTIN) 300 MG capsule Take 1 capsule by mouth every morning (before breakfast) 90 capsule 0    pantoprazole (PROTONIX) 40 MG tablet TAKE 1 TABLET TWICE A  tablet 1    PARoxetine (PAXIL) 40 MG tablet TAKE 1 TABLET DAILY 90 tablet 1    ketoconazole (NIZORAL) 2 % shampoo Lather into affected areas topically daily as needed. 1 Bottle 5    gabapentin (NEURONTIN) 600 MG tablet Take 1 tablet by mouth every evening 90 tablet 1    furosemide (LASIX) 40 MG tablet TAKE 1 TABLET DAILY 90 tablet 1    nystatin (MYCOSTATIN) 885830 UNIT/GM cream Apply topically 3 times daily 45 g 2    carbidopa-levodopa (SINEMET)  MG per tablet Take 1 tablet by mouth daily 90 tablet 3    Multiple Vitamins-Minerals (MULTIVITAMIN PO) Take 1 tablet by mouth daily.  calcium carbonate 600 MG TABS tablet Take 1 tablet by mouth daily.  aspirin 81 MG tablet Take 81 mg by mouth daily. Review of patient's allergies indicates no known allergies. History   Smoking Status    Former Smoker    Types: Cigarettes   Smokeless Tobacco    Never Used     АннаAldiane Romano RRT 1/18/16       History   Alcohol Use No       REVIEW OF SYSTEMS:  Constitutional: negative  Eyes: negative  Respiratory: negative  Cardiovascular: negative  Gastrointestinal: negative  Musculoskeletal: negative  Genitourinary: negative except for what is in HPI  Skin: negative   Neurological: negative  Hematological/Lymphatic: negative  Psychological: negative    Physical Exam:    This a 65 y.o. male   Vitals:    01/10/18 1336   BP: 110/60   Pulse: 80     Constitutional: Patient in no acute distress; Neuro: alert and oriented to person place and time. Psych: Mood and affect normal.  Skin: Normal  Lungs: Respiratory effort normal  Cardiovascular:  Normal peripheral pulses  Abdomen: Soft, non-tender, non-distended with no CVA, flank pain, hepatosplenomegaly or hernia.   Kidneys normal.  Bladder non-tender

## 2018-01-24 RX ORDER — FUROSEMIDE 40 MG/1
TABLET ORAL
Qty: 90 TABLET | Refills: 1 | Status: SHIPPED | OUTPATIENT
Start: 2018-01-24 | End: 2018-07-23 | Stop reason: SDUPTHER

## 2018-02-06 DIAGNOSIS — G62.9 PERIPHERAL POLYNEUROPATHY: Primary | ICD-10-CM

## 2018-02-06 RX ORDER — GABAPENTIN 600 MG/1
600 TABLET ORAL EVERY EVENING
Qty: 90 TABLET | Refills: 0 | Status: SHIPPED | OUTPATIENT
Start: 2018-02-06 | End: 2018-05-21 | Stop reason: SDUPTHER

## 2018-02-06 RX ORDER — GABAPENTIN 300 MG/1
300 CAPSULE ORAL
Qty: 90 CAPSULE | Refills: 0 | Status: SHIPPED | OUTPATIENT
Start: 2018-02-06 | End: 2018-05-21 | Stop reason: SDUPTHER

## 2018-02-26 RX ORDER — PAROXETINE HYDROCHLORIDE 40 MG/1
TABLET, FILM COATED ORAL
Qty: 90 TABLET | Refills: 1 | Status: SHIPPED | OUTPATIENT
Start: 2018-02-26 | End: 2018-08-23 | Stop reason: SDUPTHER

## 2018-03-01 ENCOUNTER — TELEPHONE (OUTPATIENT)
Dept: FAMILY MEDICINE CLINIC | Age: 78
End: 2018-03-01

## 2018-03-04 DIAGNOSIS — K21.9 GASTROESOPHAGEAL REFLUX DISEASE WITHOUT ESOPHAGITIS: ICD-10-CM

## 2018-03-05 RX ORDER — PANTOPRAZOLE SODIUM 40 MG/1
TABLET, DELAYED RELEASE ORAL
Qty: 180 TABLET | Refills: 1 | Status: SHIPPED | OUTPATIENT
Start: 2018-03-05 | End: 2018-09-01 | Stop reason: SDUPTHER

## 2018-03-05 NOTE — TELEPHONE ENCOUNTER
Ector Braden called requesting a refill of the below medication which has been pended for you:     Requested Prescriptions     Pending Prescriptions Disp Refills    pantoprazole (PROTONIX) 40 MG tablet [Pharmacy Med Name: PANTOPRAZOLE SOD DR TABS 40MG] 180 tablet 1     Sig: TAKE 1 TABLET TWICE A DAY       Last Appointment Date: 12/6/2017  Next Appointment Date: 6/7/2018    No Known Allergies

## 2018-04-23 RX ORDER — LISINOPRIL 2.5 MG/1
TABLET ORAL
Qty: 90 TABLET | Refills: 1 | Status: SHIPPED | OUTPATIENT
Start: 2018-04-23 | End: 2018-10-20 | Stop reason: SDUPTHER

## 2018-05-21 DIAGNOSIS — G62.9 PERIPHERAL POLYNEUROPATHY: ICD-10-CM

## 2018-05-21 RX ORDER — GABAPENTIN 300 MG/1
300 CAPSULE ORAL
Qty: 90 CAPSULE | Refills: 0 | Status: SHIPPED | OUTPATIENT
Start: 2018-05-21 | End: 2018-05-21

## 2018-05-21 RX ORDER — GABAPENTIN 600 MG/1
600 TABLET ORAL EVERY EVENING
Qty: 90 TABLET | Refills: 0 | Status: SHIPPED | OUTPATIENT
Start: 2018-05-21 | End: 2018-05-21

## 2018-05-21 RX ORDER — GABAPENTIN 600 MG/1
600 TABLET ORAL EVERY EVENING
Qty: 14 TABLET | Refills: 0 | Status: SHIPPED | OUTPATIENT
Start: 2018-05-21 | End: 2018-06-18 | Stop reason: SDUPTHER

## 2018-05-21 RX ORDER — GABAPENTIN 300 MG/1
300 CAPSULE ORAL
Qty: 14 CAPSULE | Refills: 0 | Status: SHIPPED | OUTPATIENT
Start: 2018-05-21 | End: 2018-06-18 | Stop reason: DRUGHIGH

## 2018-06-05 ENCOUNTER — HOSPITAL ENCOUNTER (OUTPATIENT)
Dept: LAB | Age: 78
Setting detail: SPECIMEN
Discharge: HOME | End: 2018-06-05
Payer: MEDICARE

## 2018-06-05 DIAGNOSIS — E11.22 TYPE 2 DIABETES MELLITUS WITH STAGE 3 CHRONIC KIDNEY DISEASE, WITHOUT LONG-TERM CURRENT USE OF INSULIN (HCC): ICD-10-CM

## 2018-06-05 DIAGNOSIS — N18.30 TYPE 2 DIABETES MELLITUS WITH STAGE 3 CHRONIC KIDNEY DISEASE, WITHOUT LONG-TERM CURRENT USE OF INSULIN (HCC): ICD-10-CM

## 2018-06-05 LAB
ABSOLUTE EOS #: 0.3 K/UL (ref 0–0.4)
ABSOLUTE IMMATURE GRANULOCYTE: ABNORMAL K/UL (ref 0–0.3)
ABSOLUTE LYMPH #: 0.8 K/UL (ref 1–4.8)
ABSOLUTE MONO #: 0.3 K/UL (ref 0.1–1.2)
ALBUMIN SERPL-MCNC: 3.8 G/DL (ref 3.5–5.2)
ALBUMIN/GLOBULIN RATIO: 1.5 (ref 1–2.5)
ALP BLD-CCNC: 108 U/L (ref 40–129)
ALT SERPL-CCNC: 13 U/L (ref 5–41)
ANION GAP SERPL CALCULATED.3IONS-SCNC: 11 MMOL/L (ref 9–17)
AST SERPL-CCNC: 14 U/L
BASOPHILS # BLD: 1 % (ref 0–2)
BASOPHILS ABSOLUTE: 0.1 K/UL (ref 0–0.2)
BILIRUB SERPL-MCNC: 0.28 MG/DL (ref 0.3–1.2)
BUN BLDV-MCNC: 28 MG/DL (ref 8–23)
BUN/CREAT BLD: 17 (ref 9–20)
CALCIUM SERPL-MCNC: 8.2 MG/DL (ref 8.6–10.4)
CHLORIDE BLD-SCNC: 107 MMOL/L (ref 98–107)
CHOLESTEROL/HDL RATIO: 4.4
CHOLESTEROL: 102 MG/DL
CO2: 24 MMOL/L (ref 20–31)
CREAT SERPL-MCNC: 1.63 MG/DL (ref 0.7–1.2)
DIFFERENTIAL TYPE: ABNORMAL
EOSINOPHILS RELATIVE PERCENT: 6 % (ref 1–8)
ESTIMATED AVERAGE GLUCOSE: 151 MG/DL
GFR AFRICAN AMERICAN: 50 ML/MIN
GFR NON-AFRICAN AMERICAN: 41 ML/MIN
GFR SERPL CREATININE-BSD FRML MDRD: ABNORMAL ML/MIN/{1.73_M2}
GFR SERPL CREATININE-BSD FRML MDRD: ABNORMAL ML/MIN/{1.73_M2}
GLUCOSE BLD-MCNC: 157 MG/DL (ref 70–99)
HBA1C MFR BLD: 6.9 % (ref 4.8–5.9)
HCT VFR BLD CALC: 37.1 % (ref 41–53)
HDLC SERPL-MCNC: 23 MG/DL
HEMOGLOBIN: 12.2 G/DL (ref 13.5–17.5)
IMMATURE GRANULOCYTES: ABNORMAL %
LDL CHOLESTEROL: 55 MG/DL (ref 0–130)
LYMPHOCYTES # BLD: 16 % (ref 15–43)
MCH RBC QN AUTO: 30.9 PG (ref 26–34)
MCHC RBC AUTO-ENTMCNC: 32.8 G/DL (ref 31–37)
MCV RBC AUTO: 94.2 FL (ref 80–100)
MONOCYTES # BLD: 7 % (ref 6–14)
NRBC AUTOMATED: ABNORMAL PER 100 WBC
PDW BLD-RTO: 14.3 % (ref 11–14.5)
PLATELET # BLD: 165 K/UL (ref 140–450)
PLATELET ESTIMATE: ABNORMAL
PMV BLD AUTO: 8.4 FL (ref 6–12)
POTASSIUM SERPL-SCNC: 4.6 MMOL/L (ref 3.7–5.3)
RBC # BLD: 3.94 M/UL (ref 4.5–5.9)
RBC # BLD: ABNORMAL 10*6/UL
SEG NEUTROPHILS: 70 % (ref 44–74)
SEGMENTED NEUTROPHILS ABSOLUTE COUNT: 3.5 K/UL (ref 1.8–7.7)
SODIUM BLD-SCNC: 142 MMOL/L (ref 135–144)
TOTAL PROTEIN: 6.3 G/DL (ref 6.4–8.3)
TRIGL SERPL-MCNC: 120 MG/DL
VLDLC SERPL CALC-MCNC: ABNORMAL MG/DL (ref 1–30)
WBC # BLD: 5.1 K/UL (ref 3.5–11)
WBC # BLD: ABNORMAL 10*3/UL

## 2018-06-05 PROCEDURE — 80053 COMPREHEN METABOLIC PANEL: CPT

## 2018-06-05 PROCEDURE — 85025 COMPLETE CBC W/AUTO DIFF WBC: CPT

## 2018-06-05 PROCEDURE — 83036 HEMOGLOBIN GLYCOSYLATED A1C: CPT

## 2018-06-05 PROCEDURE — 36415 COLL VENOUS BLD VENIPUNCTURE: CPT

## 2018-06-05 PROCEDURE — 80061 LIPID PANEL: CPT

## 2018-06-14 RX ORDER — DESLORATADINE 5 MG/1
TABLET ORAL
Qty: 90 TABLET | Refills: 1 | Status: CANCELLED | OUTPATIENT
Start: 2018-06-14

## 2018-06-15 ENCOUNTER — OFFICE VISIT (OUTPATIENT)
Dept: FAMILY MEDICINE CLINIC | Age: 78
End: 2018-06-15
Payer: MEDICARE

## 2018-06-15 VITALS
RESPIRATION RATE: 16 BRPM | WEIGHT: 280 LBS | HEIGHT: 71 IN | SYSTOLIC BLOOD PRESSURE: 90 MMHG | HEART RATE: 84 BPM | OXYGEN SATURATION: 96 % | DIASTOLIC BLOOD PRESSURE: 60 MMHG | BODY MASS INDEX: 39.2 KG/M2

## 2018-06-15 DIAGNOSIS — E78.5 DYSLIPIDEMIA: ICD-10-CM

## 2018-06-15 DIAGNOSIS — I10 ESSENTIAL HYPERTENSION: ICD-10-CM

## 2018-06-15 DIAGNOSIS — N18.30 CHRONIC KIDNEY DISEASE (CKD), STAGE III (MODERATE) (HCC): ICD-10-CM

## 2018-06-15 DIAGNOSIS — N18.30 TYPE 2 DIABETES MELLITUS WITH STAGE 3 CHRONIC KIDNEY DISEASE, WITHOUT LONG-TERM CURRENT USE OF INSULIN (HCC): ICD-10-CM

## 2018-06-15 DIAGNOSIS — F41.9 ANXIETY: ICD-10-CM

## 2018-06-15 DIAGNOSIS — F32.A DEPRESSION, UNSPECIFIED DEPRESSION TYPE: ICD-10-CM

## 2018-06-15 DIAGNOSIS — I42.0 DILATED CARDIOMYOPATHY (HCC): ICD-10-CM

## 2018-06-15 DIAGNOSIS — K21.9 GASTROESOPHAGEAL REFLUX DISEASE WITHOUT ESOPHAGITIS: ICD-10-CM

## 2018-06-15 DIAGNOSIS — E11.22 TYPE 2 DIABETES MELLITUS WITH STAGE 3 CHRONIC KIDNEY DISEASE, WITHOUT LONG-TERM CURRENT USE OF INSULIN (HCC): ICD-10-CM

## 2018-06-15 DIAGNOSIS — G62.9 PERIPHERAL POLYNEUROPATHY: Primary | ICD-10-CM

## 2018-06-15 PROCEDURE — G8598 ASA/ANTIPLAT THER USED: HCPCS | Performed by: FAMILY MEDICINE

## 2018-06-15 PROCEDURE — G8427 DOCREV CUR MEDS BY ELIG CLIN: HCPCS | Performed by: FAMILY MEDICINE

## 2018-06-15 PROCEDURE — 1123F ACP DISCUSS/DSCN MKR DOCD: CPT | Performed by: FAMILY MEDICINE

## 2018-06-15 PROCEDURE — 99214 OFFICE O/P EST MOD 30 MIN: CPT | Performed by: FAMILY MEDICINE

## 2018-06-15 PROCEDURE — 1036F TOBACCO NON-USER: CPT | Performed by: FAMILY MEDICINE

## 2018-06-15 PROCEDURE — G8417 CALC BMI ABV UP PARAM F/U: HCPCS | Performed by: FAMILY MEDICINE

## 2018-06-15 PROCEDURE — 4040F PNEUMOC VAC/ADMIN/RCVD: CPT | Performed by: FAMILY MEDICINE

## 2018-06-15 RX ORDER — HYDROCODONE BITARTRATE AND ACETAMINOPHEN 5; 325 MG/1; MG/1
1 TABLET ORAL EVERY 8 HOURS PRN
Qty: 90 TABLET | Refills: 0 | Status: SHIPPED | OUTPATIENT
Start: 2018-06-15 | End: 2018-06-18 | Stop reason: SDUPTHER

## 2018-06-15 RX ORDER — SOTALOL HYDROCHLORIDE 80 MG/1
TABLET ORAL
Qty: 180 TABLET | Refills: 1 | Status: SHIPPED | OUTPATIENT
Start: 2018-06-15 | End: 2018-12-12 | Stop reason: SDUPTHER

## 2018-06-15 RX ORDER — DESLORATADINE 5 MG/1
TABLET ORAL
Qty: 90 TABLET | Refills: 1 | Status: SHIPPED | OUTPATIENT
Start: 2018-06-15 | End: 2018-12-12 | Stop reason: SDUPTHER

## 2018-06-15 ASSESSMENT — ENCOUNTER SYMPTOMS
EYE DISCHARGE: 0
WHEEZING: 0
TROUBLE SWALLOWING: 0
SINUS PRESSURE: 0
RHINORRHEA: 0
EYE REDNESS: 0
CONSTIPATION: 0
DIARRHEA: 0
VOMITING: 0
NAUSEA: 0
SORE THROAT: 0
COUGH: 0
SHORTNESS OF BREATH: 0
ABDOMINAL PAIN: 0

## 2018-06-18 ENCOUNTER — TELEPHONE (OUTPATIENT)
Dept: FAMILY MEDICINE CLINIC | Age: 78
End: 2018-06-18

## 2018-06-18 DIAGNOSIS — G62.9 PERIPHERAL POLYNEUROPATHY: ICD-10-CM

## 2018-06-18 RX ORDER — HYDROCODONE BITARTRATE AND ACETAMINOPHEN 5; 325 MG/1; MG/1
1 TABLET ORAL EVERY 8 HOURS PRN
Qty: 90 TABLET | Refills: 0
Start: 2018-06-18 | End: 2018-11-16 | Stop reason: SDUPTHER

## 2018-06-18 RX ORDER — GABAPENTIN 600 MG/1
600 TABLET ORAL 2 TIMES DAILY
Qty: 180 TABLET | Refills: 0
Start: 2018-06-18 | End: 2018-07-25 | Stop reason: SDUPTHER

## 2018-07-24 RX ORDER — FUROSEMIDE 40 MG/1
TABLET ORAL
Qty: 90 TABLET | Refills: 1 | Status: SHIPPED | OUTPATIENT
Start: 2018-07-24 | End: 2019-03-18 | Stop reason: SDUPTHER

## 2018-07-25 DIAGNOSIS — G62.9 PERIPHERAL POLYNEUROPATHY: Primary | ICD-10-CM

## 2018-07-25 RX ORDER — GABAPENTIN 600 MG/1
600 TABLET ORAL 2 TIMES DAILY
Qty: 180 TABLET | Refills: 0 | Status: SHIPPED | OUTPATIENT
Start: 2018-07-25 | End: 2018-09-04

## 2018-08-23 RX ORDER — PAROXETINE HYDROCHLORIDE 40 MG/1
TABLET, FILM COATED ORAL
Qty: 90 TABLET | Refills: 1 | Status: SHIPPED | OUTPATIENT
Start: 2018-08-23 | End: 2019-06-20 | Stop reason: SDUPTHER

## 2018-08-23 NOTE — TELEPHONE ENCOUNTER
Evaristo Downing called requesting a refill of the below medication which has been pended for you:     Requested Prescriptions     Pending Prescriptions Disp Refills    PARoxetine (PAXIL) 40 MG tablet [Pharmacy Med Name: PAROXETINE HCL TABS 40MG] 90 tablet 1     Sig: TAKE 1 TABLET DAILY       Last Appointment Date: 6/15/2018  Next Appointment Date: 12/19/2018    No Known Allergies

## 2018-09-01 DIAGNOSIS — K21.9 GASTROESOPHAGEAL REFLUX DISEASE WITHOUT ESOPHAGITIS: ICD-10-CM

## 2018-09-04 DIAGNOSIS — G62.9 PERIPHERAL POLYNEUROPATHY: ICD-10-CM

## 2018-09-04 RX ORDER — GABAPENTIN 300 MG/1
600 CAPSULE ORAL 2 TIMES DAILY
Qty: 180 CAPSULE | Refills: 1 | Status: SHIPPED | OUTPATIENT
Start: 2018-09-04 | End: 2018-11-02

## 2018-09-04 RX ORDER — PANTOPRAZOLE SODIUM 40 MG/1
TABLET, DELAYED RELEASE ORAL
Qty: 180 TABLET | Refills: 1 | Status: SHIPPED | OUTPATIENT
Start: 2018-09-04 | End: 2019-06-20 | Stop reason: SDUPTHER

## 2018-09-27 ENCOUNTER — TELEPHONE (OUTPATIENT)
Dept: FAMILY MEDICINE CLINIC | Age: 78
End: 2018-09-27

## 2018-09-27 NOTE — TELEPHONE ENCOUNTER
Daughter calling stating that the pt has been experiencing restless leg syndrome. Daughter states that her mother took Ropinirole and it helped her a lot, so she was wondering if pt could try it for a couple of weeks. Please call daughter and advise. Daughter aware rodriguez tTWV is out of office and that she will not get a return call until tomorrow.

## 2018-09-28 RX ORDER — ROPINIROLE 0.5 MG/1
0.5 TABLET, FILM COATED ORAL 2 TIMES DAILY
Qty: 60 TABLET | Refills: 3 | Status: SHIPPED | OUTPATIENT
Start: 2018-09-28 | End: 2018-10-12 | Stop reason: SDUPTHER

## 2018-10-12 ENCOUNTER — TELEPHONE (OUTPATIENT)
Dept: FAMILY MEDICINE CLINIC | Age: 78
End: 2018-10-12

## 2018-10-12 RX ORDER — ROPINIROLE 1 MG/1
1 TABLET, FILM COATED ORAL 2 TIMES DAILY
Qty: 60 TABLET | Refills: 2 | Status: SHIPPED | OUTPATIENT
Start: 2018-10-12 | End: 2019-03-01 | Stop reason: ALTCHOICE

## 2018-10-12 NOTE — TELEPHONE ENCOUNTER
Karen Escobedo states daughter told them pt's requip could be increased and khalida states she would like this done at this time, please send script to above loaded pharmacy.

## 2018-10-22 RX ORDER — LISINOPRIL 2.5 MG/1
TABLET ORAL
Qty: 90 TABLET | Refills: 1 | Status: ON HOLD | OUTPATIENT
Start: 2018-10-22 | End: 2019-03-04 | Stop reason: HOSPADM

## 2018-11-02 DIAGNOSIS — G62.9 PERIPHERAL POLYNEUROPATHY: ICD-10-CM

## 2018-11-02 RX ORDER — GABAPENTIN 600 MG/1
600 TABLET ORAL 2 TIMES DAILY
Qty: 180 TABLET | Refills: 0 | Status: SHIPPED | OUTPATIENT
Start: 2018-11-02 | End: 2019-02-21 | Stop reason: SDUPTHER

## 2018-11-02 RX ORDER — GABAPENTIN 300 MG/1
600 CAPSULE ORAL 2 TIMES DAILY
Qty: 180 CAPSULE | Refills: 1 | Status: CANCELLED | OUTPATIENT
Start: 2018-11-02 | End: 2019-01-31

## 2018-11-02 NOTE — TELEPHONE ENCOUNTER
Javon Lou called requesting a refill of the below medication which has been pended for you: this was switched to Gabapentin 600 mg 1 po bid. We sent script in for this on 9/4/2018. Patient last filled 7/31/2018 #180    Requested Prescriptions     Pending Prescriptions Disp Refills    gabapentin (NEURONTIN) 300 MG capsule 180 capsule 1     Sig: Take 2 capsules by mouth 2 times daily for 90 days. .       Last Appointment Date: 6/15/2018  Next Appointment Date: 12/19/2018    No Known Allergies

## 2018-11-16 ENCOUNTER — OFFICE VISIT (OUTPATIENT)
Dept: FAMILY MEDICINE CLINIC | Age: 78
End: 2018-11-16
Payer: MEDICARE

## 2018-11-16 VITALS
RESPIRATION RATE: 16 BRPM | HEIGHT: 71 IN | HEART RATE: 88 BPM | WEIGHT: 284 LBS | DIASTOLIC BLOOD PRESSURE: 70 MMHG | SYSTOLIC BLOOD PRESSURE: 120 MMHG | BODY MASS INDEX: 39.76 KG/M2

## 2018-11-16 DIAGNOSIS — G62.9 PERIPHERAL POLYNEUROPATHY: ICD-10-CM

## 2018-11-16 DIAGNOSIS — R10.13 EPIGASTRIC PAIN: Primary | ICD-10-CM

## 2018-11-16 DIAGNOSIS — M15.9 PRIMARY OSTEOARTHRITIS INVOLVING MULTIPLE JOINTS: ICD-10-CM

## 2018-11-16 DIAGNOSIS — F32.1 CURRENT MODERATE EPISODE OF MAJOR DEPRESSIVE DISORDER WITHOUT PRIOR EPISODE (HCC): ICD-10-CM

## 2018-11-16 PROCEDURE — G8598 ASA/ANTIPLAT THER USED: HCPCS | Performed by: FAMILY MEDICINE

## 2018-11-16 PROCEDURE — 99214 OFFICE O/P EST MOD 30 MIN: CPT | Performed by: FAMILY MEDICINE

## 2018-11-16 PROCEDURE — 4040F PNEUMOC VAC/ADMIN/RCVD: CPT | Performed by: FAMILY MEDICINE

## 2018-11-16 PROCEDURE — 1101F PT FALLS ASSESS-DOCD LE1/YR: CPT | Performed by: FAMILY MEDICINE

## 2018-11-16 PROCEDURE — 1123F ACP DISCUSS/DSCN MKR DOCD: CPT | Performed by: FAMILY MEDICINE

## 2018-11-16 PROCEDURE — G8427 DOCREV CUR MEDS BY ELIG CLIN: HCPCS | Performed by: FAMILY MEDICINE

## 2018-11-16 PROCEDURE — 1036F TOBACCO NON-USER: CPT | Performed by: FAMILY MEDICINE

## 2018-11-16 PROCEDURE — G8484 FLU IMMUNIZE NO ADMIN: HCPCS | Performed by: FAMILY MEDICINE

## 2018-11-16 PROCEDURE — G8417 CALC BMI ABV UP PARAM F/U: HCPCS | Performed by: FAMILY MEDICINE

## 2018-11-16 RX ORDER — HYDROCODONE BITARTRATE AND ACETAMINOPHEN 5; 325 MG/1; MG/1
1 TABLET ORAL EVERY 8 HOURS PRN
Qty: 90 TABLET | Refills: 0 | Status: SHIPPED | OUTPATIENT
Start: 2018-11-16 | End: 2019-03-15 | Stop reason: SDUPTHER

## 2018-11-16 RX ORDER — CYANOCOBALAMIN (VITAMIN B-12) 5000 MCG
1 TABLET,DISINTEGRATING ORAL DAILY
COMMUNITY

## 2018-11-16 ASSESSMENT — ENCOUNTER SYMPTOMS
RHINORRHEA: 0
EYE REDNESS: 0
WHEEZING: 0
SORE THROAT: 0
SHORTNESS OF BREATH: 0
VOMITING: 0
COUGH: 0
CONSTIPATION: 0
TROUBLE SWALLOWING: 0
ABDOMINAL PAIN: 0
EYE DISCHARGE: 0
SINUS PRESSURE: 0
BACK PAIN: 1
DIARRHEA: 0
NAUSEA: 0

## 2018-11-16 NOTE — PROGRESS NOTES
fever or chills or other acute related symptoms. Does state that he needs a refill of his Norco.  He does uses for chronic ongoing arthritic pain as well as pain related to his peripheral neuropathy. Uses this relatively sparingly. Did talk about his depression today as well due to the fact that he did feel that his stress may be concerning to his acute GI symptoms. He feels that he is doing relatively well on his current dose of Paxil. Does state that he has been trying to get out and do more things outside and being a little bit more active so this has helped. Otherwise today no other acute medical concerns. Patient otherwise has no other concerns today. .   Other review of systems are as noted below. Did review patient's med list, allergies, social history, fam history, pmhx and pshx today as noted in the record. Preventative measures are reviewed today. See health maintenance section for complete preventative plan of care. Review of Systems   Constitutional: Negative for chills, fatigue and fever. HENT: Negative for congestion, ear pain, postnasal drip, rhinorrhea, sinus pressure, sore throat and trouble swallowing. Eyes: Negative for discharge and redness. Respiratory: Negative for cough, shortness of breath and wheezing. Cardiovascular: Negative for chest pain. Gastrointestinal: Negative for abdominal pain, constipation, diarrhea, nausea and vomiting. Musculoskeletal: Positive for arthralgias, back pain, gait problem and myalgias. Negative for neck pain. Skin: Negative for rash and wound. Allergic/Immunologic: Negative for environmental allergies. Neurological: Negative for dizziness, weakness, light-headedness and headaches. Hematological: Negative for adenopathy. Psychiatric/Behavioral: Negative. Prior to Visit Medications    Medication Sig Taking?  Authorizing Provider   Cyanocobalamin (VITAMIN B-12) 5000 MCG TBDP Take by mouth daily Yes Historical Provider, MD HYDROcodone-acetaminophen (NORCO) 5-325 MG per tablet Take 1 tablet by mouth every 8 hours as needed for Pain for up to 90 days. Romel Hutchinson, DO   gabapentin (NEURONTIN) 600 MG tablet Take 1 tablet by mouth 2 times daily for 90 days. Romel Hutchinson, DO   lisinopril (PRINIVIL;ZESTRIL) 2.5 MG tablet TAKE 1 TABLET DAILY Yes Jennifer Glass DO   rOPINIRole (REQUIP) 1 MG tablet Take 1 tablet by mouth 2 times daily Yes Yesenia Robledo DO   pantoprazole (PROTONIX) 40 MG tablet TAKE 1 TABLET TWICE A DAY Yes Jennifer Glass DO   PARoxetine (PAXIL) 40 MG tablet TAKE 1 TABLET DAILY Yes Yesenia Robledo DO   furosemide (LASIX) 40 MG tablet TAKE 1 TABLET DAILY Yes Yesenia Robledo DO   Handicap Placard MISC by Does not apply route Expires 5 years (June 2023) Yes Yesenia Robledo DO   desloratadine (CLARINEX) 5 MG tablet TAKE 1 TABLET DAILY Yes Yesenia Robledo DO   sotalol (BETAPACE) 80 MG tablet TAKE 1 TABLET TWICE A DAY Yes Jennifer Glass DO   metoprolol tartrate (LOPRESSOR) 25 MG tablet TAKE 1 TABLET TWICE A DAY Yes Jennifer Glass DO   nystatin (MYCOSTATIN) 808679 UNIT/GM cream Apply topically 3 times daily Yes Yesenia Robledo DO   Multiple Vitamins-Minerals (MULTIVITAMIN PO) Take 1 tablet by mouth daily. Yes Historical Provider, MD   calcium carbonate 600 MG TABS tablet Take 1 tablet by mouth daily. Yes Historical Provider, MD   aspirin 81 MG tablet Take 81 mg by mouth daily.  Yes Historical Provider, MD        Social History   Substance Use Topics    Smoking status: Former Smoker     Packs/day: 1.00     Years: 20.00     Types: Cigarettes     Quit date: 5/1/1979    Smokeless tobacco: Never Used      CommentIlsa Mix, RRT 1/18/16    Alcohol use No        Vitals:    11/16/18 1029   BP: 120/70   Site: Right Upper Arm   Position: Sitting   Cuff Size: Large Adult   Pulse: 88   Resp: 16   Weight: 284 lb (128.8 kg)   Height: 5' 11\" (1.803 m)     Estimated body mass index is

## 2018-12-12 RX ORDER — DESLORATADINE 5 MG/1
TABLET ORAL
Qty: 90 TABLET | Refills: 1 | Status: SHIPPED | OUTPATIENT
Start: 2018-12-12 | End: 2019-06-20 | Stop reason: SDUPTHER

## 2018-12-12 RX ORDER — SOTALOL HYDROCHLORIDE 80 MG/1
TABLET ORAL
Qty: 180 TABLET | Refills: 1 | Status: SHIPPED | OUTPATIENT
Start: 2018-12-12 | End: 2019-03-15 | Stop reason: ALTCHOICE

## 2018-12-18 LAB
AVERAGE GLUCOSE: NORMAL
CHOLESTEROL, TOTAL: 89 MG/DL
CHOLESTEROL/HDL RATIO: 4
CREATININE: 1.4 MG/DL
HBA1C MFR BLD: 7.3 %
HDLC SERPL-MCNC: 22 MG/DL (ref 35–70)
LDL CHOLESTEROL CALCULATED: 52.4 MG/DL (ref 0–160)
POTASSIUM (K+): 4.4
TRIGL SERPL-MCNC: 73 MG/DL
TSH SERPL DL<=0.05 MIU/L-ACNC: 3.54 UIU/ML
VLDLC SERPL CALC-MCNC: 15 MG/DL

## 2018-12-19 ENCOUNTER — OFFICE VISIT (OUTPATIENT)
Dept: FAMILY MEDICINE CLINIC | Age: 78
End: 2018-12-19
Payer: MEDICARE

## 2018-12-19 VITALS
HEART RATE: 80 BPM | WEIGHT: 287 LBS | RESPIRATION RATE: 16 BRPM | SYSTOLIC BLOOD PRESSURE: 114 MMHG | DIASTOLIC BLOOD PRESSURE: 70 MMHG | HEIGHT: 71 IN | BODY MASS INDEX: 40.18 KG/M2

## 2018-12-19 DIAGNOSIS — N18.30 CHRONIC KIDNEY DISEASE (CKD), STAGE III (MODERATE) (HCC): ICD-10-CM

## 2018-12-19 DIAGNOSIS — I42.0 DILATED CARDIOMYOPATHY (HCC): ICD-10-CM

## 2018-12-19 DIAGNOSIS — E11.22 TYPE 2 DIABETES MELLITUS WITH STAGE 3 CHRONIC KIDNEY DISEASE, WITHOUT LONG-TERM CURRENT USE OF INSULIN (HCC): Primary | ICD-10-CM

## 2018-12-19 DIAGNOSIS — G63 POLYNEUROPATHY ASSOCIATED WITH UNDERLYING DISEASE (HCC): ICD-10-CM

## 2018-12-19 DIAGNOSIS — F32.1 CURRENT MODERATE EPISODE OF MAJOR DEPRESSIVE DISORDER WITHOUT PRIOR EPISODE (HCC): ICD-10-CM

## 2018-12-19 DIAGNOSIS — E66.01 MORBID OBESITY WITH BMI OF 40.0-44.9, ADULT (HCC): ICD-10-CM

## 2018-12-19 DIAGNOSIS — F41.9 ANXIETY: ICD-10-CM

## 2018-12-19 DIAGNOSIS — N18.30 TYPE 2 DIABETES MELLITUS WITH STAGE 3 CHRONIC KIDNEY DISEASE, WITHOUT LONG-TERM CURRENT USE OF INSULIN (HCC): Primary | ICD-10-CM

## 2018-12-19 DIAGNOSIS — Z00.00 MEDICARE ANNUAL WELLNESS VISIT, SUBSEQUENT: ICD-10-CM

## 2018-12-19 DIAGNOSIS — I10 ESSENTIAL HYPERTENSION: ICD-10-CM

## 2018-12-19 DIAGNOSIS — Z12.5 SCREENING FOR PROSTATE CANCER: ICD-10-CM

## 2018-12-19 DIAGNOSIS — E78.5 DYSLIPIDEMIA: ICD-10-CM

## 2018-12-19 PROCEDURE — G8427 DOCREV CUR MEDS BY ELIG CLIN: HCPCS | Performed by: FAMILY MEDICINE

## 2018-12-19 PROCEDURE — G8484 FLU IMMUNIZE NO ADMIN: HCPCS | Performed by: FAMILY MEDICINE

## 2018-12-19 PROCEDURE — G0439 PPPS, SUBSEQ VISIT: HCPCS | Performed by: FAMILY MEDICINE

## 2018-12-19 PROCEDURE — 4040F PNEUMOC VAC/ADMIN/RCVD: CPT | Performed by: FAMILY MEDICINE

## 2018-12-19 PROCEDURE — G8417 CALC BMI ABV UP PARAM F/U: HCPCS | Performed by: FAMILY MEDICINE

## 2018-12-19 PROCEDURE — 99214 OFFICE O/P EST MOD 30 MIN: CPT | Performed by: FAMILY MEDICINE

## 2018-12-19 PROCEDURE — 1123F ACP DISCUSS/DSCN MKR DOCD: CPT | Performed by: FAMILY MEDICINE

## 2018-12-19 PROCEDURE — G0444 DEPRESSION SCREEN ANNUAL: HCPCS | Performed by: FAMILY MEDICINE

## 2018-12-19 PROCEDURE — G8598 ASA/ANTIPLAT THER USED: HCPCS | Performed by: FAMILY MEDICINE

## 2018-12-19 PROCEDURE — 1036F TOBACCO NON-USER: CPT | Performed by: FAMILY MEDICINE

## 2018-12-19 PROCEDURE — 1101F PT FALLS ASSESS-DOCD LE1/YR: CPT | Performed by: FAMILY MEDICINE

## 2018-12-19 ASSESSMENT — PATIENT HEALTH QUESTIONNAIRE - PHQ9: SUM OF ALL RESPONSES TO PHQ QUESTIONS 1-9: 8

## 2018-12-19 ASSESSMENT — ENCOUNTER SYMPTOMS
WHEEZING: 0
EYE DISCHARGE: 0
NAUSEA: 0
SORE THROAT: 0
VOMITING: 0
ABDOMINAL PAIN: 0
TROUBLE SWALLOWING: 0
CONSTIPATION: 0
EYE REDNESS: 0
SHORTNESS OF BREATH: 0
DIARRHEA: 0
SINUS PRESSURE: 0
COUGH: 0
BACK PAIN: 1
RHINORRHEA: 0

## 2018-12-19 ASSESSMENT — ANXIETY QUESTIONNAIRES: GAD7 TOTAL SCORE: 0

## 2018-12-19 ASSESSMENT — LIFESTYLE VARIABLES: HOW OFTEN DO YOU HAVE A DRINK CONTAINING ALCOHOL: 0

## 2018-12-19 NOTE — PROGRESS NOTES
time.  He has a known history of chronic kidney disease and his creatinine remains stable and controlled at this time. Has a known history chronic anemia and his hemoglobin is 11.6. Has had previous bariatric surgery so this is somewhat chronic for him. Has a known history of hyperlipidemia and his cholesterol levels are stable and controlled at this time on his current statin medication. Does have a consistent low HDL of 22 so again encouraged increased exercise as able to help improve his HDL levels. Has a known history of hypertension and his blood pressure is stable and controlled on his current medical therapy. Does have known anxiety and depression which is worsened since the loss of his spouse but he continues on Paxil and feels that this is keeping him relatively stable. He did not want to add any further medical therapy to help with his depression and anxiety symptoms. Has a known history of restless leg syndrome and is on Requip. This does seem to help with his symptoms. Has known history of dilated cardiomyopathy and does follow with the cardiologist for continued management of this issue. Is not having any acute cardiac issues at this time. Did recently see his cardiologist in notes that everything was stable at that time. Other review of systems are as noted below. Did review patient's med list, allergies, social history, fam history, pmhx and pshx today as noted in the record. Preventative measures are reviewed today. See health maintenance section for complete preventative plan of care. Review of Systems   Constitutional: Negative for chills, fatigue and fever. HENT: Negative for congestion, ear pain, postnasal drip, rhinorrhea, sinus pressure, sore throat and trouble swallowing. Eyes: Negative for discharge and redness. Respiratory: Negative for cough, shortness of breath and wheezing. Cardiovascular: Negative for chest pain.    Gastrointestinal: Negative for

## 2019-01-01 ENCOUNTER — TELEPHONE (OUTPATIENT)
Dept: PHARMACY | Facility: CLINIC | Age: 79
End: 2019-01-01

## 2019-01-01 ENCOUNTER — HOSPITAL ENCOUNTER (INPATIENT)
Age: 79
LOS: 3 days | Discharge: HOME | DRG: 291 | End: 2019-12-09
Attending: EMERGENCY MEDICINE | Admitting: INTERNAL MEDICINE
Payer: MEDICARE

## 2019-01-01 ENCOUNTER — APPOINTMENT (OUTPATIENT)
Dept: GENERAL RADIOLOGY | Age: 79
DRG: 291 | End: 2019-01-01
Payer: MEDICARE

## 2019-01-01 ENCOUNTER — HOSPITAL ENCOUNTER (EMERGENCY)
Age: 79
Discharge: HOME | End: 2019-12-19
Attending: EMERGENCY MEDICINE
Payer: MEDICARE

## 2019-01-01 ENCOUNTER — TELEPHONE (OUTPATIENT)
Dept: SURGERY | Age: 79
End: 2019-01-01

## 2019-01-01 ENCOUNTER — CARE COORDINATION (OUTPATIENT)
Dept: CASE MANAGEMENT | Age: 79
End: 2019-01-01

## 2019-01-01 ENCOUNTER — TELEPHONE (OUTPATIENT)
Dept: PRIMARY CARE CLINIC | Age: 79
End: 2019-01-01

## 2019-01-01 ENCOUNTER — APPOINTMENT (OUTPATIENT)
Dept: CT IMAGING | Age: 79
End: 2019-01-01
Payer: MEDICARE

## 2019-01-01 ENCOUNTER — OFFICE VISIT (OUTPATIENT)
Dept: FAMILY MEDICINE CLINIC | Age: 79
End: 2019-01-01
Payer: MEDICARE

## 2019-01-01 ENCOUNTER — OFFICE VISIT (OUTPATIENT)
Dept: SURGERY | Age: 79
End: 2019-01-01
Payer: MEDICARE

## 2019-01-01 ENCOUNTER — HOSPITAL ENCOUNTER (EMERGENCY)
Age: 79
Discharge: HOME | End: 2019-12-14
Attending: EMERGENCY MEDICINE
Payer: MEDICARE

## 2019-01-01 ENCOUNTER — TELEPHONE (OUTPATIENT)
Dept: OTHER | Facility: CLINIC | Age: 79
End: 2019-01-01

## 2019-01-01 ENCOUNTER — TELEPHONE (OUTPATIENT)
Dept: FAMILY MEDICINE CLINIC | Age: 79
End: 2019-01-01

## 2019-01-01 ENCOUNTER — CARE COORDINATION (OUTPATIENT)
Dept: CARE COORDINATION | Age: 79
End: 2019-01-01

## 2019-01-01 VITALS
HEIGHT: 72 IN | BODY MASS INDEX: 34.95 KG/M2 | SYSTOLIC BLOOD PRESSURE: 106 MMHG | DIASTOLIC BLOOD PRESSURE: 60 MMHG | WEIGHT: 258 LBS | HEART RATE: 88 BPM | OXYGEN SATURATION: 96 % | RESPIRATION RATE: 18 BRPM

## 2019-01-01 VITALS
DIASTOLIC BLOOD PRESSURE: 68 MMHG | OXYGEN SATURATION: 98 % | BODY MASS INDEX: 34.73 KG/M2 | WEIGHT: 256.4 LBS | SYSTOLIC BLOOD PRESSURE: 105 MMHG | RESPIRATION RATE: 16 BRPM | HEART RATE: 90 BPM | TEMPERATURE: 97.5 F | HEIGHT: 72 IN

## 2019-01-01 VITALS
BODY MASS INDEX: 34.67 KG/M2 | TEMPERATURE: 98.1 F | OXYGEN SATURATION: 94 % | WEIGHT: 256 LBS | DIASTOLIC BLOOD PRESSURE: 67 MMHG | HEART RATE: 78 BPM | HEIGHT: 72 IN | RESPIRATION RATE: 16 BRPM | SYSTOLIC BLOOD PRESSURE: 94 MMHG

## 2019-01-01 VITALS
HEART RATE: 88 BPM | RESPIRATION RATE: 16 BRPM | SYSTOLIC BLOOD PRESSURE: 98 MMHG | WEIGHT: 256 LBS | BODY MASS INDEX: 34.72 KG/M2 | TEMPERATURE: 97.6 F | OXYGEN SATURATION: 96 % | DIASTOLIC BLOOD PRESSURE: 62 MMHG

## 2019-01-01 VITALS — SYSTOLIC BLOOD PRESSURE: 110 MMHG | DIASTOLIC BLOOD PRESSURE: 60 MMHG | HEART RATE: 86 BPM | TEMPERATURE: 98.6 F

## 2019-01-01 DIAGNOSIS — R10.31 RIGHT GROIN PAIN: Primary | ICD-10-CM

## 2019-01-01 DIAGNOSIS — I50.23 ACUTE ON CHRONIC SYSTOLIC CONGESTIVE HEART FAILURE (HCC): ICD-10-CM

## 2019-01-01 DIAGNOSIS — K40.90 RIGHT INGUINAL HERNIA: ICD-10-CM

## 2019-01-01 DIAGNOSIS — I50.9 CONGESTIVE HEART FAILURE, UNSPECIFIED HF CHRONICITY, UNSPECIFIED HEART FAILURE TYPE (HCC): ICD-10-CM

## 2019-01-01 DIAGNOSIS — I10 ESSENTIAL HYPERTENSION: ICD-10-CM

## 2019-01-01 DIAGNOSIS — I50.9 CONGESTIVE HEART FAILURE, UNSPECIFIED HF CHRONICITY, UNSPECIFIED HEART FAILURE TYPE (HCC): Primary | ICD-10-CM

## 2019-01-01 DIAGNOSIS — N18.30 CHRONIC KIDNEY DISEASE (CKD), STAGE III (MODERATE) (HCC): ICD-10-CM

## 2019-01-01 DIAGNOSIS — J18.9 PNEUMONIA OF RIGHT LOWER LOBE DUE TO INFECTIOUS ORGANISM: Primary | ICD-10-CM

## 2019-01-01 DIAGNOSIS — E11.22 TYPE 2 DIABETES MELLITUS WITH STAGE 3 CHRONIC KIDNEY DISEASE, WITHOUT LONG-TERM CURRENT USE OF INSULIN (HCC): ICD-10-CM

## 2019-01-01 DIAGNOSIS — F32.1 CURRENT MODERATE EPISODE OF MAJOR DEPRESSIVE DISORDER WITHOUT PRIOR EPISODE (HCC): ICD-10-CM

## 2019-01-01 DIAGNOSIS — E78.5 DYSLIPIDEMIA: ICD-10-CM

## 2019-01-01 DIAGNOSIS — R97.20 ELEVATED PSA: ICD-10-CM

## 2019-01-01 DIAGNOSIS — G62.9 PERIPHERAL POLYNEUROPATHY: ICD-10-CM

## 2019-01-01 DIAGNOSIS — J18.9 PNEUMONIA DUE TO ORGANISM: Primary | ICD-10-CM

## 2019-01-01 DIAGNOSIS — M79.2 NEUROPATHIC PAIN: Primary | ICD-10-CM

## 2019-01-01 DIAGNOSIS — R10.31 RIGHT LOWER QUADRANT PAIN: ICD-10-CM

## 2019-01-01 DIAGNOSIS — N18.30 TYPE 2 DIABETES MELLITUS WITH STAGE 3 CHRONIC KIDNEY DISEASE, WITHOUT LONG-TERM CURRENT USE OF INSULIN (HCC): ICD-10-CM

## 2019-01-01 DIAGNOSIS — R10.9 ABDOMINAL PAIN, UNSPECIFIED ABDOMINAL LOCATION: Primary | ICD-10-CM

## 2019-01-01 DIAGNOSIS — N43.3 RIGHT HYDROCELE: ICD-10-CM

## 2019-01-01 DIAGNOSIS — I50.43 ACUTE ON CHRONIC COMBINED SYSTOLIC AND DIASTOLIC CONGESTIVE HEART FAILURE (HCC): ICD-10-CM

## 2019-01-01 DIAGNOSIS — I42.0 DILATED CARDIOMYOPATHY (HCC): ICD-10-CM

## 2019-01-01 LAB
-: NORMAL
ABSOLUTE EOS #: 0.24 K/UL (ref 0–0.4)
ABSOLUTE EOS #: 0.4 K/UL (ref 0–0.4)
ABSOLUTE EOS #: 0.5 K/UL (ref 0–0.4)
ABSOLUTE EOS #: 0.5 K/UL (ref 0–0.4)
ABSOLUTE IMMATURE GRANULOCYTE: 0 K/UL (ref 0–0.3)
ABSOLUTE IMMATURE GRANULOCYTE: ABNORMAL K/UL (ref 0–0.3)
ABSOLUTE LYMPH #: 0.6 K/UL (ref 1–4.8)
ABSOLUTE LYMPH #: 0.7 K/UL (ref 1–4.8)
ABSOLUTE LYMPH #: 0.8 K/UL (ref 1–4.8)
ABSOLUTE LYMPH #: 0.8 K/UL (ref 1–4.8)
ABSOLUTE MONO #: 0.3 K/UL (ref 0.1–1.2)
ABSOLUTE MONO #: 0.4 K/UL (ref 0.1–1.2)
ABSOLUTE MONO #: 0.42 K/UL (ref 0.1–1.2)
ALBUMIN SERPL-MCNC: 3.9 G/DL
ALBUMIN SERPL-MCNC: 3.9 G/DL
ALBUMIN SERPL-MCNC: 3.9 G/DL (ref 3.5–5.2)
ALBUMIN/GLOBULIN RATIO: 1.6 (ref 1–2.5)
ALP BLD-CCNC: 115 U/L
ALP BLD-CCNC: 115 U/L
ALP BLD-CCNC: 118 U/L (ref 40–129)
ALT SERPL-CCNC: 17 U/L (ref 5–41)
ALT SERPL-CCNC: 33 U/L
ALT SERPL-CCNC: 33 U/L
AMORPHOUS: NORMAL
AMYLASE: 26 U/L (ref 28–100)
ANION GAP SERPL CALCULATED.3IONS-SCNC: 11 MMOL/L (ref 9–17)
ANION GAP SERPL CALCULATED.3IONS-SCNC: 12 MMOL/L (ref 9–17)
ANION GAP SERPL CALCULATED.3IONS-SCNC: 12 MMOL/L (ref 9–17)
ANION GAP SERPL CALCULATED.3IONS-SCNC: 5 MMOL/L
ANION GAP SERPL CALCULATED.3IONS-SCNC: 5 MMOL/L
ANION GAP SERPL CALCULATED.3IONS-SCNC: 8 MMOL/L (ref 9–17)
ANION GAP SERPL CALCULATED.3IONS-SCNC: 8 MMOL/L (ref 9–17)
ANION GAP SERPL CALCULATED.3IONS-SCNC: 9 MMOL/L (ref 9–17)
AST SERPL-CCNC: 25 U/L
AVERAGE GLUCOSE: NORMAL
AVERAGE GLUCOSE: NORMAL
BACTERIA: NORMAL
BASOPHILS # BLD: 0 % (ref 0–2)
BASOPHILS # BLD: 1 % (ref 0–2)
BASOPHILS ABSOLUTE: 0 K/UL (ref 0–0.2)
BASOPHILS ABSOLUTE: 0.1 /ΜL
BASOPHILS ABSOLUTE: 0.1 /ΜL
BASOPHILS ABSOLUTE: 0.1 K/UL (ref 0–0.2)
BASOPHILS RELATIVE PERCENT: 1.7 %
BASOPHILS RELATIVE PERCENT: 1.7 %
BILIRUB SERPL-MCNC: 0.37 MG/DL (ref 0.3–1.2)
BILIRUB SERPL-MCNC: 0.8 MG/DL (ref 0.1–1.4)
BILIRUB SERPL-MCNC: 0.8 MG/DL (ref 0.1–1.4)
BILIRUBIN DIRECT: 0.17 MG/DL
BILIRUBIN URINE: NEGATIVE
BILIRUBIN, INDIRECT: 0.2 MG/DL (ref 0–1)
BNP INTERPRETATION: ABNORMAL
BUN BLDV-MCNC: 23 MG/DL (ref 8–23)
BUN BLDV-MCNC: 24 MG/DL (ref 8–23)
BUN BLDV-MCNC: 26 MG/DL
BUN BLDV-MCNC: 26 MG/DL
BUN BLDV-MCNC: 27 MG/DL (ref 8–23)
BUN BLDV-MCNC: 27 MG/DL (ref 8–23)
BUN BLDV-MCNC: 29 MG/DL (ref 8–23)
BUN BLDV-MCNC: 33 MG/DL (ref 8–23)
BUN/CREAT BLD: 15 (ref 9–20)
BUN/CREAT BLD: 16 (ref 9–20)
BUN/CREAT BLD: 17 (ref 9–20)
BUN/CREAT BLD: 17 (ref 9–20)
BUN/CREAT BLD: 18 (ref 9–20)
BUN/CREAT BLD: 19 (ref 9–20)
CALCIUM SERPL-MCNC: 7.9 MG/DL (ref 8.6–10.4)
CALCIUM SERPL-MCNC: 8.1 MG/DL (ref 8.6–10.4)
CALCIUM SERPL-MCNC: 8.1 MG/DL (ref 8.6–10.4)
CALCIUM SERPL-MCNC: 8.4 MG/DL
CALCIUM SERPL-MCNC: 8.4 MG/DL
CALCIUM SERPL-MCNC: 8.4 MG/DL (ref 8.6–10.4)
CALCIUM SERPL-MCNC: 8.4 MG/DL (ref 8.6–10.4)
CALCIUM SERPL-MCNC: 8.5 MG/DL (ref 8.6–10.4)
CASTS UA: NORMAL /LPF (ref 0–2)
CASTS UA: NORMAL /LPF (ref 0–2)
CHLORIDE BLD-SCNC: 100 MMOL/L (ref 98–107)
CHLORIDE BLD-SCNC: 103 MMOL/L (ref 98–107)
CHLORIDE BLD-SCNC: 103 MMOL/L (ref 98–107)
CHLORIDE BLD-SCNC: 104 MMOL/L (ref 98–107)
CHLORIDE BLD-SCNC: 105 MMOL/L (ref 98–107)
CHLORIDE BLD-SCNC: 106 MMOL/L (ref 98–107)
CHLORIDE BLD-SCNC: 108 MMOL/L
CHLORIDE BLD-SCNC: 108 MMOL/L
CHOLESTEROL, TOTAL: 97 MG/DL
CHOLESTEROL/HDL RATIO: 4.41
CO2: 26 MMOL/L (ref 20–31)
CO2: 27 MMOL/L (ref 20–31)
CO2: 27 MMOL/L (ref 20–31)
CO2: 28 MMOL/L (ref 20–31)
CO2: 29 MMOL/L (ref 20–31)
CO2: 30 MMOL/L
CO2: 30 MMOL/L
CO2: 30 MMOL/L (ref 20–31)
COLOR: NORMAL
COMMENT UA: NORMAL
CREAT SERPL-MCNC: 1.42 MG/DL (ref 0.7–1.2)
CREAT SERPL-MCNC: 1.5 MG/DL
CREAT SERPL-MCNC: 1.5 MG/DL
CREAT SERPL-MCNC: 1.52 MG/DL (ref 0.7–1.2)
CREAT SERPL-MCNC: 1.55 MG/DL (ref 0.7–1.2)
CREAT SERPL-MCNC: 1.56 MG/DL (ref 0.7–1.2)
CREAT SERPL-MCNC: 1.59 MG/DL (ref 0.7–1.2)
CREAT SERPL-MCNC: 1.91 MG/DL (ref 0.7–1.2)
CRYSTALS, UA: NORMAL /HPF
CULTURE: NORMAL
DIFFERENTIAL TYPE: ABNORMAL
EKG ATRIAL RATE: 48 BPM
EKG ATRIAL RATE: 63 BPM
EKG Q-T INTERVAL: 402 MS
EKG Q-T INTERVAL: 510 MS
EKG QRS DURATION: 128 MS
EKG QRS DURATION: 196 MS
EKG QTC CALCULATION (BAZETT): 491 MS
EKG QTC CALCULATION (BAZETT): 592 MS
EKG R AXIS: -129 DEGREES
EKG R AXIS: -31 DEGREES
EKG T AXIS: 135 DEGREES
EKG T AXIS: 26 DEGREES
EKG VENTRICULAR RATE: 81 BPM
EKG VENTRICULAR RATE: 90 BPM
EOSINOPHILS ABSOLUTE: 0.4 /ΜL
EOSINOPHILS ABSOLUTE: 0.4 /ΜL
EOSINOPHILS RELATIVE PERCENT: 4 % (ref 1–8)
EOSINOPHILS RELATIVE PERCENT: 7 % (ref 1–8)
EOSINOPHILS RELATIVE PERCENT: 7.14 %
EOSINOPHILS RELATIVE PERCENT: 7.14 %
EOSINOPHILS RELATIVE PERCENT: 9 % (ref 1–8)
EPITHELIAL CELLS UA: NORMAL /HPF (ref 0–5)
GFR AFRICAN AMERICAN: 41 ML/MIN
GFR AFRICAN AMERICAN: 51 ML/MIN
GFR AFRICAN AMERICAN: 52 ML/MIN
GFR AFRICAN AMERICAN: 53 ML/MIN
GFR AFRICAN AMERICAN: 54 ML/MIN
GFR AFRICAN AMERICAN: 58 ML/MIN
GFR CALCULATED: 47.9
GFR CALCULATED: NORMAL
GFR NON-AFRICAN AMERICAN: 34 ML/MIN
GFR NON-AFRICAN AMERICAN: 42 ML/MIN
GFR NON-AFRICAN AMERICAN: 43 ML/MIN
GFR NON-AFRICAN AMERICAN: 43 ML/MIN
GFR NON-AFRICAN AMERICAN: 44 ML/MIN
GFR NON-AFRICAN AMERICAN: 48 ML/MIN
GFR SERPL CREATININE-BSD FRML MDRD: ABNORMAL ML/MIN/{1.73_M2}
GLOBULIN: 2.4 G/DL (ref 1.5–3.8)
GLUCOSE BLD-MCNC: 115 MG/DL (ref 70–99)
GLUCOSE BLD-MCNC: 121 MG/DL (ref 75–110)
GLUCOSE BLD-MCNC: 134 MG/DL (ref 70–99)
GLUCOSE BLD-MCNC: 140 MG/DL
GLUCOSE BLD-MCNC: 140 MG/DL
GLUCOSE BLD-MCNC: 146 MG/DL (ref 70–99)
GLUCOSE BLD-MCNC: 151 MG/DL (ref 75–110)
GLUCOSE BLD-MCNC: 156 MG/DL (ref 70–99)
GLUCOSE BLD-MCNC: 171 MG/DL (ref 70–99)
GLUCOSE BLD-MCNC: 171 MG/DL (ref 70–99)
GLUCOSE URINE: NEGATIVE
HBA1C MFR BLD: 6.6 %
HBA1C MFR BLD: 6.6 %
HCT VFR BLD CALC: 31.5 % (ref 41–53)
HCT VFR BLD CALC: 33.9 % (ref 41–53)
HCT VFR BLD CALC: 34.1 % (ref 41–53)
HCT VFR BLD CALC: 34.6 % (ref 41–53)
HCT VFR BLD CALC: 34.7 % (ref 41–53)
HCT VFR BLD CALC: 37.7 % (ref 40.7–50.3)
HCT VFR BLD CALC: 38.2 % (ref 41–53)
HCT VFR BLD CALC: 38.2 % (ref 41–53)
HDLC SERPL-MCNC: 22 MG/DL (ref 35–70)
HEMOGLOBIN: 10.2 G/DL (ref 13.5–17.5)
HEMOGLOBIN: 10.8 G/DL (ref 13.5–17.5)
HEMOGLOBIN: 10.9 G/DL (ref 13.5–17.5)
HEMOGLOBIN: 11 G/DL (ref 13.5–17.5)
HEMOGLOBIN: 11 G/DL (ref 13.5–17.5)
HEMOGLOBIN: 11.1 G/DL (ref 13.5–17.5)
HEMOGLOBIN: 11.1 G/DL (ref 13.5–17.5)
HEMOGLOBIN: 11.7 G/DL (ref 13–17)
IMMATURE GRANULOCYTES: 0 %
IMMATURE GRANULOCYTES: ABNORMAL %
INR BLD: 1.2
KETONES, URINE: NEGATIVE
LACTIC ACID: 0.9 MMOL/L (ref 0.5–2.2)
LDL CHOLESTEROL CALCULATED: 61.8 MG/DL (ref 0–160)
LEUKOCYTE ESTERASE, URINE: NEGATIVE
LIPASE: 65 U/L (ref 13–60)
LV EF: 25 %
LVEF MODALITY: NORMAL
LYMPHOCYTES # BLD: 10 % (ref 15–43)
LYMPHOCYTES # BLD: 11 % (ref 15–43)
LYMPHOCYTES # BLD: 14 % (ref 15–43)
LYMPHOCYTES # BLD: 15 % (ref 15–43)
LYMPHOCYTES ABSOLUTE: 0.76 /ΜL
LYMPHOCYTES ABSOLUTE: 0.76 /ΜL
LYMPHOCYTES RELATIVE PERCENT: 13.4 %
LYMPHOCYTES RELATIVE PERCENT: 13.4 %
Lab: NORMAL
MCH RBC QN AUTO: 26.4 PG
MCH RBC QN AUTO: 26.4 PG
MCH RBC QN AUTO: 28.3 PG (ref 25.2–33.5)
MCH RBC QN AUTO: 28.6 PG (ref 26–34)
MCH RBC QN AUTO: 28.8 PG (ref 26–34)
MCH RBC QN AUTO: 28.9 PG (ref 26–34)
MCH RBC QN AUTO: 29.1 PG (ref 26–34)
MCH RBC QN AUTO: 29.1 PG (ref 26–34)
MCHC RBC AUTO-ENTMCNC: 28.9 G/DL
MCHC RBC AUTO-ENTMCNC: 28.9 G/DL
MCHC RBC AUTO-ENTMCNC: 31 G/DL (ref 25.2–33.5)
MCHC RBC AUTO-ENTMCNC: 31.9 G/DL (ref 31–37)
MCHC RBC AUTO-ENTMCNC: 31.9 G/DL (ref 31–37)
MCHC RBC AUTO-ENTMCNC: 32 G/DL (ref 31–37)
MCHC RBC AUTO-ENTMCNC: 32.1 G/DL (ref 31–37)
MCHC RBC AUTO-ENTMCNC: 32.3 G/DL (ref 31–37)
MCV RBC AUTO: 89.4 FL (ref 80–100)
MCV RBC AUTO: 89.5 FL (ref 80–100)
MCV RBC AUTO: 90.4 FL (ref 80–100)
MCV RBC AUTO: 90.7 FL (ref 80–100)
MCV RBC AUTO: 91 FL (ref 80–100)
MCV RBC AUTO: 91.1 FL (ref 82.6–102.9)
MCV RBC AUTO: 91.2 FL
MCV RBC AUTO: 91.2 FL
MONOCYTES # BLD: 6 % (ref 6–14)
MONOCYTES # BLD: 7 % (ref 6–14)
MONOCYTES ABSOLUTE: 0.2 /ΜL
MONOCYTES ABSOLUTE: 0.2 /ΜL
MONOCYTES RELATIVE PERCENT: 3.54 %
MONOCYTES RELATIVE PERCENT: 3.54 %
MORPHOLOGY: ABNORMAL
MORPHOLOGY: ABNORMAL
MUCUS: NORMAL
NEUTROPHILS ABSOLUTE: 4.19 /ΜL
NEUTROPHILS ABSOLUTE: 4.19 /ΜL
NEUTROPHILS RELATIVE PERCENT: 74.21 %
NEUTROPHILS RELATIVE PERCENT: 74.21 %
NITRITE, URINE: NEGATIVE
NRBC AUTOMATED: 0 PER 100 WBC
NRBC AUTOMATED: ABNORMAL PER 100 WBC
OTHER OBSERVATIONS UA: NORMAL
PARTIAL THROMBOPLASTIN TIME: 27.3 SEC (ref 27–35)
PDW BLD-RTO: 14.3 %
PDW BLD-RTO: 14.3 %
PDW BLD-RTO: 15.1 % (ref 11.8–14.4)
PDW BLD-RTO: 16.2 % (ref 11–14.5)
PDW BLD-RTO: 16.3 % (ref 11–14.5)
PDW BLD-RTO: 16.7 % (ref 11–14.5)
PDW BLD-RTO: 16.7 % (ref 11–14.5)
PDW BLD-RTO: 16.8 % (ref 11–14.5)
PH UA: 5.5 (ref 5–6)
PLATELET # BLD: 149 K/UL (ref 140–450)
PLATELET # BLD: 150 K/UL (ref 140–450)
PLATELET # BLD: 152 K/UL (ref 140–450)
PLATELET # BLD: 152.9 K/ΜL
PLATELET # BLD: 152.9 K/ΜL
PLATELET # BLD: 154 K/UL (ref 140–450)
PLATELET # BLD: 172 K/UL (ref 140–450)
PLATELET # BLD: 187 K/UL (ref 138–453)
PLATELET ESTIMATE: ABNORMAL
PMV BLD AUTO: 7.7 FL (ref 6–12)
PMV BLD AUTO: 7.9 FL (ref 6–12)
PMV BLD AUTO: 8 FL (ref 6–12)
PMV BLD AUTO: 8 FL (ref 6–12)
PMV BLD AUTO: 8.2 FL (ref 6–12)
PMV BLD AUTO: 9.9 FL (ref 8.1–13.5)
PMV BLD AUTO: ABNORMAL FL
PMV BLD AUTO: ABNORMAL FL
POTASSIUM SERPL-SCNC: 4.1 MMOL/L (ref 3.7–5.3)
POTASSIUM SERPL-SCNC: 4.2 MMOL/L (ref 3.7–5.3)
POTASSIUM SERPL-SCNC: 4.3 MMOL/L (ref 3.7–5.3)
POTASSIUM SERPL-SCNC: 4.3 MMOL/L (ref 3.7–5.3)
POTASSIUM SERPL-SCNC: 4.5 MMOL/L (ref 3.7–5.3)
POTASSIUM SERPL-SCNC: 4.6 MMOL/L
POTASSIUM SERPL-SCNC: 4.6 MMOL/L
POTASSIUM SERPL-SCNC: 4.8 MMOL/L (ref 3.7–5.3)
PRO-BNP: ABNORMAL PG/ML
PROSTATE SPECIFIC ANTIGEN: 3.87 NG/ML
PROTEIN UA: NEGATIVE
PROTHROMBIN TIME: 12.9 SEC (ref 9.4–11.3)
RBC # BLD: 3.53 M/UL (ref 4.5–5.9)
RBC # BLD: 3.74 M/UL (ref 4.5–5.9)
RBC # BLD: 3.74 M/UL (ref 4.5–5.9)
RBC # BLD: 3.83 M/UL (ref 4.5–5.9)
RBC # BLD: 3.88 M/UL (ref 4.5–5.9)
RBC # BLD: 4.14 M/UL (ref 4.21–5.77)
RBC # BLD: 4.19 10^6/ΜL
RBC # BLD: 4.19 10^6/ΜL
RBC # BLD: ABNORMAL 10*6/UL
RBC UA: NORMAL /HPF (ref 0–4)
RENAL EPITHELIAL, UA: NORMAL /HPF
SEG NEUTROPHILS: 68 % (ref 44–74)
SEG NEUTROPHILS: 69 % (ref 44–74)
SEG NEUTROPHILS: 70 % (ref 44–74)
SEG NEUTROPHILS: 70 % (ref 44–74)
SEG NEUTROPHILS: 75 % (ref 44–74)
SEG NEUTROPHILS: 79 % (ref 44–74)
SEGMENTED NEUTROPHILS ABSOLUTE COUNT: 3.3 K/UL (ref 1.8–7.7)
SEGMENTED NEUTROPHILS ABSOLUTE COUNT: 3.7 K/UL (ref 1.8–7.7)
SEGMENTED NEUTROPHILS ABSOLUTE COUNT: 3.9 K/UL (ref 1.8–7.7)
SEGMENTED NEUTROPHILS ABSOLUTE COUNT: 4.74 K/UL (ref 1.8–7.7)
SODIUM BLD-SCNC: 140 MMOL/L (ref 135–144)
SODIUM BLD-SCNC: 141 MMOL/L (ref 135–144)
SODIUM BLD-SCNC: 141 MMOL/L (ref 135–144)
SODIUM BLD-SCNC: 142 MMOL/L (ref 135–144)
SODIUM BLD-SCNC: 143 MMOL/L
SODIUM BLD-SCNC: 143 MMOL/L
SPECIFIC GRAVITY UA: 1.02 (ref 1.01–1.02)
SPECIMEN DESCRIPTION: NORMAL
TOTAL PROTEIN: 6.3 G/DL (ref 6.4–8.3)
TOTAL PROTEIN: 6.8
TOTAL PROTEIN: 6.8
TRICHOMONAS: NORMAL
TRIGL SERPL-MCNC: 66 MG/DL
TROPONIN INTERP: ABNORMAL
TROPONIN INTERP: ABNORMAL
TROPONIN T: ABNORMAL NG/ML
TROPONIN T: ABNORMAL NG/ML
TROPONIN, HIGH SENSITIVITY: 34 NG/L (ref 0–22)
TROPONIN, HIGH SENSITIVITY: 40 NG/L (ref 0–22)
TURBIDITY: NORMAL
URINE HGB: NEGATIVE
UROBILINOGEN, URINE: NORMAL
VLDLC SERPL CALC-MCNC: 13 MG/DL
WBC # BLD: 4.7 K/UL (ref 3.5–11)
WBC # BLD: 4.7 K/UL (ref 3.5–11)
WBC # BLD: 4.9 K/UL (ref 3.5–11)
WBC # BLD: 5.2 K/UL (ref 3.5–11)
WBC # BLD: 5.4 K/UL (ref 3.5–11)
WBC # BLD: 5.6 10^3/ML
WBC # BLD: 5.6 10^3/ML
WBC # BLD: 6 K/UL (ref 3.5–11.3)
WBC # BLD: ABNORMAL 10*3/UL
WBC UA: NORMAL /HPF (ref 0–4)
YEAST: NORMAL

## 2019-01-01 PROCEDURE — 1111F DSCHRG MED/CURRENT MED MERGE: CPT | Performed by: PHARMACIST

## 2019-01-01 PROCEDURE — 97116 GAIT TRAINING THERAPY: CPT | Performed by: PHYSICAL THERAPY ASSISTANT

## 2019-01-01 PROCEDURE — 96376 TX/PRO/DX INJ SAME DRUG ADON: CPT

## 2019-01-01 PROCEDURE — 6360000004 HC RX CONTRAST MEDICATION: Performed by: EMERGENCY MEDICINE

## 2019-01-01 PROCEDURE — 2060000000 HC ICU INTERMEDIATE R&B

## 2019-01-01 PROCEDURE — 1111F DSCHRG MED/CURRENT MED MERGE: CPT | Performed by: FAMILY MEDICINE

## 2019-01-01 PROCEDURE — G8427 DOCREV CUR MEDS BY ELIG CLIN: HCPCS | Performed by: SURGERY

## 2019-01-01 PROCEDURE — 94640 AIRWAY INHALATION TREATMENT: CPT

## 2019-01-01 PROCEDURE — 99283 EMERGENCY DEPT VISIT LOW MDM: CPT

## 2019-01-01 PROCEDURE — 1111F DSCHRG MED/CURRENT MED MERGE: CPT | Performed by: SURGERY

## 2019-01-01 PROCEDURE — G8598 ASA/ANTIPLAT THER USED: HCPCS | Performed by: SURGERY

## 2019-01-01 PROCEDURE — 6360000002 HC RX W HCPCS: Performed by: INTERNAL MEDICINE

## 2019-01-01 PROCEDURE — 96374 THER/PROPH/DIAG INJ IV PUSH: CPT

## 2019-01-01 PROCEDURE — 84484 ASSAY OF TROPONIN QUANT: CPT

## 2019-01-01 PROCEDURE — 83690 ASSAY OF LIPASE: CPT

## 2019-01-01 PROCEDURE — 6370000000 HC RX 637 (ALT 250 FOR IP): Performed by: INTERNAL MEDICINE

## 2019-01-01 PROCEDURE — 1036F TOBACCO NON-USER: CPT | Performed by: SURGERY

## 2019-01-01 PROCEDURE — 71045 X-RAY EXAM CHEST 1 VIEW: CPT

## 2019-01-01 PROCEDURE — 94760 N-INVAS EAR/PLS OXIMETRY 1: CPT

## 2019-01-01 PROCEDURE — 82947 ASSAY GLUCOSE BLOOD QUANT: CPT

## 2019-01-01 PROCEDURE — 80048 BASIC METABOLIC PNL TOTAL CA: CPT

## 2019-01-01 PROCEDURE — 96375 TX/PRO/DX INJ NEW DRUG ADDON: CPT

## 2019-01-01 PROCEDURE — 36415 COLL VENOUS BLD VENIPUNCTURE: CPT

## 2019-01-01 PROCEDURE — 87040 BLOOD CULTURE FOR BACTERIA: CPT

## 2019-01-01 PROCEDURE — 6360000002 HC RX W HCPCS: Performed by: EMERGENCY MEDICINE

## 2019-01-01 PROCEDURE — 2580000003 HC RX 258: Performed by: EMERGENCY MEDICINE

## 2019-01-01 PROCEDURE — 6370000000 HC RX 637 (ALT 250 FOR IP): Performed by: EMERGENCY MEDICINE

## 2019-01-01 PROCEDURE — 93306 TTE W/DOPPLER COMPLETE: CPT

## 2019-01-01 PROCEDURE — 99204 OFFICE O/P NEW MOD 45 MIN: CPT | Performed by: SURGERY

## 2019-01-01 PROCEDURE — 71250 CT THORAX DX C-: CPT

## 2019-01-01 PROCEDURE — 94150 VITAL CAPACITY TEST: CPT

## 2019-01-01 PROCEDURE — 99232 SBSQ HOSP IP/OBS MODERATE 35: CPT | Performed by: INTERNAL MEDICINE

## 2019-01-01 PROCEDURE — 93005 ELECTROCARDIOGRAM TRACING: CPT | Performed by: INTERNAL MEDICINE

## 2019-01-01 PROCEDURE — 2580000003 HC RX 258: Performed by: INTERNAL MEDICINE

## 2019-01-01 PROCEDURE — 81001 URINALYSIS AUTO W/SCOPE: CPT

## 2019-01-01 PROCEDURE — 83605 ASSAY OF LACTIC ACID: CPT

## 2019-01-01 PROCEDURE — 85025 COMPLETE CBC W/AUTO DIFF WBC: CPT

## 2019-01-01 PROCEDURE — 94664 DEMO&/EVAL PT USE INHALER: CPT

## 2019-01-01 PROCEDURE — 99238 HOSP IP/OBS DSCHRG MGMT 30/<: CPT | Performed by: INTERNAL MEDICINE

## 2019-01-01 PROCEDURE — 74176 CT ABD & PELVIS W/O CONTRAST: CPT

## 2019-01-01 PROCEDURE — 4040F PNEUMOC VAC/ADMIN/RCVD: CPT | Performed by: SURGERY

## 2019-01-01 PROCEDURE — 6370000000 HC RX 637 (ALT 250 FOR IP)

## 2019-01-01 PROCEDURE — 85730 THROMBOPLASTIN TIME PARTIAL: CPT

## 2019-01-01 PROCEDURE — 82150 ASSAY OF AMYLASE: CPT

## 2019-01-01 PROCEDURE — 85610 PROTHROMBIN TIME: CPT

## 2019-01-01 PROCEDURE — 97110 THERAPEUTIC EXERCISES: CPT | Performed by: PHYSICAL THERAPY ASSISTANT

## 2019-01-01 PROCEDURE — 99223 1ST HOSP IP/OBS HIGH 75: CPT | Performed by: INTERNAL MEDICINE

## 2019-01-01 PROCEDURE — G8484 FLU IMMUNIZE NO ADMIN: HCPCS | Performed by: SURGERY

## 2019-01-01 PROCEDURE — 93005 ELECTROCARDIOGRAM TRACING: CPT | Performed by: EMERGENCY MEDICINE

## 2019-01-01 PROCEDURE — 97161 PT EVAL LOW COMPLEX 20 MIN: CPT | Performed by: PHYSICAL THERAPIST

## 2019-01-01 PROCEDURE — 83880 ASSAY OF NATRIURETIC PEPTIDE: CPT

## 2019-01-01 PROCEDURE — 80076 HEPATIC FUNCTION PANEL: CPT

## 2019-01-01 PROCEDURE — G8417 CALC BMI ABV UP PARAM F/U: HCPCS | Performed by: SURGERY

## 2019-01-01 PROCEDURE — 99284 EMERGENCY DEPT VISIT MOD MDM: CPT

## 2019-01-01 PROCEDURE — 1123F ACP DISCUSS/DSCN MKR DOCD: CPT | Performed by: SURGERY

## 2019-01-01 PROCEDURE — 94761 N-INVAS EAR/PLS OXIMETRY MLT: CPT

## 2019-01-01 PROCEDURE — 99285 EMERGENCY DEPT VISIT HI MDM: CPT

## 2019-01-01 PROCEDURE — 99496 TRANSJ CARE MGMT HIGH F2F 7D: CPT | Performed by: FAMILY MEDICINE

## 2019-01-01 RX ORDER — LISINOPRIL 5 MG/1
5 TABLET ORAL DAILY
Qty: 90 TABLET | Refills: 1 | Status: CANCELLED | OUTPATIENT
Start: 2019-01-01

## 2019-01-01 RX ORDER — SODIUM CHLORIDE 9 MG/ML
INJECTION, SOLUTION INTRAVENOUS CONTINUOUS
Status: DISCONTINUED | OUTPATIENT
Start: 2019-01-01 | End: 2019-01-01 | Stop reason: HOSPADM

## 2019-01-01 RX ORDER — PANTOPRAZOLE SODIUM 40 MG/1
40 TABLET, DELAYED RELEASE ORAL DAILY
COMMUNITY

## 2019-01-01 RX ORDER — LANOLIN ALCOHOL/MO/W.PET/CERES
5000 CREAM (GRAM) TOPICAL DAILY
Status: DISCONTINUED | OUTPATIENT
Start: 2019-01-01 | End: 2019-01-01 | Stop reason: HOSPADM

## 2019-01-01 RX ORDER — CALCIUM CARBONATE 500(1250)
500 TABLET ORAL DAILY
Status: DISCONTINUED | OUTPATIENT
Start: 2019-01-01 | End: 2019-01-01 | Stop reason: HOSPADM

## 2019-01-01 RX ORDER — LISINOPRIL 2.5 MG/1
2.5 TABLET ORAL DAILY
Status: DISCONTINUED | OUTPATIENT
Start: 2019-01-01 | End: 2019-01-01

## 2019-01-01 RX ORDER — BUPROPION HYDROCHLORIDE 150 MG/1
150 TABLET ORAL EVERY MORNING
Status: DISCONTINUED | OUTPATIENT
Start: 2019-01-01 | End: 2019-01-01 | Stop reason: HOSPADM

## 2019-01-01 RX ORDER — PAROXETINE HYDROCHLORIDE 20 MG/1
40 TABLET, FILM COATED ORAL DAILY
Status: DISCONTINUED | OUTPATIENT
Start: 2019-01-01 | End: 2019-01-01 | Stop reason: HOSPADM

## 2019-01-01 RX ORDER — SODIUM CHLORIDE 0.9 % (FLUSH) 0.9 %
10 SYRINGE (ML) INJECTION EVERY 12 HOURS SCHEDULED
Status: DISCONTINUED | OUTPATIENT
Start: 2019-01-01 | End: 2019-01-01 | Stop reason: HOSPADM

## 2019-01-01 RX ORDER — LISINOPRIL 5 MG/1
5 TABLET ORAL DAILY
Status: DISCONTINUED | OUTPATIENT
Start: 2019-01-01 | End: 2019-01-01 | Stop reason: HOSPADM

## 2019-01-01 RX ORDER — CETIRIZINE HYDROCHLORIDE 5 MG/1
5 TABLET ORAL DAILY
Status: DISCONTINUED | OUTPATIENT
Start: 2019-01-01 | End: 2019-01-01 | Stop reason: HOSPADM

## 2019-01-01 RX ORDER — FUROSEMIDE 10 MG/ML
20 INJECTION INTRAMUSCULAR; INTRAVENOUS ONCE
Status: COMPLETED | OUTPATIENT
Start: 2019-01-01 | End: 2019-01-01

## 2019-01-01 RX ORDER — 0.9 % SODIUM CHLORIDE 0.9 %
500 INTRAVENOUS SOLUTION INTRAVENOUS ONCE
Status: COMPLETED | OUTPATIENT
Start: 2019-01-01 | End: 2019-01-01

## 2019-01-01 RX ORDER — TIZANIDINE 2 MG/1
2 TABLET ORAL NIGHTLY PRN
COMMUNITY

## 2019-01-01 RX ORDER — PAROXETINE HYDROCHLORIDE 40 MG/1
TABLET, FILM COATED ORAL
Qty: 90 TABLET | Refills: 0 | Status: SHIPPED | OUTPATIENT
Start: 2019-01-01 | End: 2020-01-01 | Stop reason: SDUPTHER

## 2019-01-01 RX ORDER — GABAPENTIN 600 MG/1
TABLET ORAL
Qty: 180 TABLET | Refills: 0 | Status: CANCELLED | OUTPATIENT
Start: 2019-01-01 | End: 2020-01-01

## 2019-01-01 RX ORDER — HYDROCODONE BITARTRATE AND ACETAMINOPHEN 5; 325 MG/1; MG/1
1 TABLET ORAL EVERY 6 HOURS PRN
Qty: 10 TABLET | Refills: 0 | Status: SHIPPED | OUTPATIENT
Start: 2019-01-01 | End: 2019-01-01

## 2019-01-01 RX ORDER — SODIUM CHLORIDE 0.9 % (FLUSH) 0.9 %
10 SYRINGE (ML) INJECTION PRN
Status: DISCONTINUED | OUTPATIENT
Start: 2019-01-01 | End: 2019-01-01 | Stop reason: HOSPADM

## 2019-01-01 RX ORDER — CETIRIZINE HYDROCHLORIDE 5 MG/1
10 TABLET ORAL DAILY
Status: DISCONTINUED | OUTPATIENT
Start: 2019-01-01 | End: 2019-01-01 | Stop reason: DRUGHIGH

## 2019-01-01 RX ORDER — LORATADINE 10 MG/1
10 TABLET ORAL DAILY
Qty: 90 TABLET | Refills: 1 | Status: CANCELLED | OUTPATIENT
Start: 2019-01-01

## 2019-01-01 RX ORDER — GABAPENTIN 600 MG/1
TABLET ORAL
Qty: 180 TABLET | Refills: 0 | Status: SHIPPED | OUTPATIENT
Start: 2019-01-01 | End: 2020-01-01 | Stop reason: SDUPTHER

## 2019-01-01 RX ORDER — SOTALOL HYDROCHLORIDE 80 MG/1
80 TABLET ORAL 2 TIMES DAILY
Status: DISCONTINUED | OUTPATIENT
Start: 2019-01-01 | End: 2019-01-01 | Stop reason: HOSPADM

## 2019-01-01 RX ORDER — MORPHINE SULFATE 4 MG/ML
4 INJECTION, SOLUTION INTRAMUSCULAR; INTRAVENOUS ONCE
Status: COMPLETED | OUTPATIENT
Start: 2019-01-01 | End: 2019-01-01

## 2019-01-01 RX ORDER — GABAPENTIN 600 MG/1
600 TABLET ORAL 2 TIMES DAILY
Status: DISCONTINUED | OUTPATIENT
Start: 2019-01-01 | End: 2019-01-01 | Stop reason: HOSPADM

## 2019-01-01 RX ORDER — PRAMIPEXOLE DIHYDROCHLORIDE 0.25 MG/1
0.25 TABLET ORAL NIGHTLY
Status: DISCONTINUED | OUTPATIENT
Start: 2019-01-01 | End: 2019-01-01 | Stop reason: HOSPADM

## 2019-01-01 RX ORDER — TIZANIDINE 4 MG/1
2 TABLET ORAL NIGHTLY
Status: DISCONTINUED | OUTPATIENT
Start: 2019-01-01 | End: 2019-01-01 | Stop reason: HOSPADM

## 2019-01-01 RX ORDER — IPRATROPIUM BROMIDE AND ALBUTEROL SULFATE 2.5; .5 MG/3ML; MG/3ML
1 SOLUTION RESPIRATORY (INHALATION) 4 TIMES DAILY
Status: DISCONTINUED | OUTPATIENT
Start: 2019-01-01 | End: 2019-01-01 | Stop reason: HOSPADM

## 2019-01-01 RX ORDER — TRIAMCINOLONE ACETONIDE 1 MG/G
CREAM TOPICAL
COMMUNITY

## 2019-01-01 RX ORDER — FUROSEMIDE 10 MG/ML
20 INJECTION INTRAMUSCULAR; INTRAVENOUS 2 TIMES DAILY
Status: DISCONTINUED | OUTPATIENT
Start: 2019-01-01 | End: 2019-01-01 | Stop reason: HOSPADM

## 2019-01-01 RX ORDER — ALBUTEROL SULFATE 2.5 MG/3ML
2.5 SOLUTION RESPIRATORY (INHALATION)
Status: DISCONTINUED | OUTPATIENT
Start: 2019-01-01 | End: 2019-01-01

## 2019-01-01 RX ORDER — ONDANSETRON 2 MG/ML
4 INJECTION INTRAMUSCULAR; INTRAVENOUS ONCE
Status: COMPLETED | OUTPATIENT
Start: 2019-01-01 | End: 2019-01-01

## 2019-01-01 RX ORDER — PANTOPRAZOLE SODIUM 40 MG/1
40 TABLET, DELAYED RELEASE ORAL 2 TIMES DAILY
Status: DISCONTINUED | OUTPATIENT
Start: 2019-01-01 | End: 2019-01-01 | Stop reason: HOSPADM

## 2019-01-01 RX ORDER — FUROSEMIDE 40 MG/1
TABLET ORAL
Qty: 90 TABLET | Refills: 0 | Status: ON HOLD | OUTPATIENT
Start: 2019-01-01 | End: 2020-01-01 | Stop reason: HOSPADM

## 2019-01-01 RX ORDER — DICYCLOMINE HYDROCHLORIDE 10 MG/1
10 CAPSULE ORAL 4 TIMES DAILY PRN
Qty: 60 CAPSULE | Refills: 1 | Status: ON HOLD | OUTPATIENT
Start: 2019-01-01 | End: 2020-01-01 | Stop reason: HOSPADM

## 2019-01-01 RX ORDER — ALBUTEROL SULFATE 2.5 MG/3ML
2.5 SOLUTION RESPIRATORY (INHALATION)
Status: DISCONTINUED | OUTPATIENT
Start: 2019-01-01 | End: 2019-01-01 | Stop reason: HOSPADM

## 2019-01-01 RX ORDER — ASPIRIN 81 MG/1
81 TABLET ORAL DAILY
Status: DISCONTINUED | OUTPATIENT
Start: 2019-01-01 | End: 2019-01-01 | Stop reason: HOSPADM

## 2019-01-01 RX ORDER — ACETAMINOPHEN 325 MG/1
650 TABLET ORAL EVERY 4 HOURS PRN
Status: DISCONTINUED | OUTPATIENT
Start: 2019-01-01 | End: 2019-01-01 | Stop reason: HOSPADM

## 2019-01-01 RX ORDER — BUPROPION HYDROCHLORIDE 300 MG/1
300 TABLET ORAL EVERY MORNING
Qty: 90 TABLET | Refills: 1 | Status: SHIPPED | OUTPATIENT
Start: 2019-01-01 | End: 2020-01-01 | Stop reason: SDUPTHER

## 2019-01-01 RX ORDER — HYDROCODONE BITARTRATE AND ACETAMINOPHEN 5; 325 MG/1; MG/1
2 TABLET ORAL ONCE
Status: COMPLETED | OUTPATIENT
Start: 2019-01-01 | End: 2019-01-01

## 2019-01-01 RX ORDER — LISINOPRIL 5 MG/1
5 TABLET ORAL DAILY
Qty: 30 TABLET | Refills: 0 | Status: SHIPPED | OUTPATIENT
Start: 2019-01-01 | End: 2019-01-01 | Stop reason: SDUPTHER

## 2019-01-01 RX ORDER — PHENOL 1.4 %
1 AEROSOL, SPRAY (ML) MUCOUS MEMBRANE DAILY
Status: DISCONTINUED | OUTPATIENT
Start: 2019-01-01 | End: 2019-01-01 | Stop reason: CLARIF

## 2019-01-01 RX ORDER — HYDROCODONE BITARTRATE AND ACETAMINOPHEN 5; 325 MG/1; MG/1
2 TABLET ORAL EVERY MORNING
COMMUNITY

## 2019-01-01 RX ORDER — SODIUM CHLORIDE FOR INHALATION 0.9 %
3 VIAL, NEBULIZER (ML) INHALATION
Status: DISCONTINUED | OUTPATIENT
Start: 2019-01-01 | End: 2019-01-01 | Stop reason: HOSPADM

## 2019-01-01 RX ORDER — MULTIVITAMIN WITH FOLIC ACID 400 MCG
1 TABLET ORAL DAILY
Status: DISCONTINUED | OUTPATIENT
Start: 2019-01-01 | End: 2019-01-01 | Stop reason: HOSPADM

## 2019-01-01 RX ORDER — HYDROCODONE BITARTRATE AND ACETAMINOPHEN 5; 325 MG/1; MG/1
1 TABLET ORAL EVERY 4 HOURS PRN
Qty: 20 TABLET | Refills: 0 | Status: SHIPPED | OUTPATIENT
Start: 2019-01-01 | End: 2020-01-01 | Stop reason: SDUPTHER

## 2019-01-01 RX ORDER — LISINOPRIL 5 MG/1
5 TABLET ORAL DAILY
Qty: 90 TABLET | Refills: 1 | Status: ON HOLD | OUTPATIENT
Start: 2019-01-01 | End: 2020-01-01 | Stop reason: HOSPADM

## 2019-01-01 RX ADMIN — PANTOPRAZOLE SODIUM 40 MG: 40 TABLET, DELAYED RELEASE ORAL at 21:22

## 2019-01-01 RX ADMIN — SOTALOL HYDROCHLORIDE 80 MG: 80 TABLET ORAL at 08:22

## 2019-01-01 RX ADMIN — ASPIRIN 81 MG: 81 TABLET ORAL at 08:29

## 2019-01-01 RX ADMIN — FUROSEMIDE 20 MG: 10 INJECTION, SOLUTION INTRAMUSCULAR; INTRAVENOUS at 08:30

## 2019-01-01 RX ADMIN — Medication 10 ML: at 08:23

## 2019-01-01 RX ADMIN — Medication 10 ML: at 21:02

## 2019-01-01 RX ADMIN — ONDANSETRON 4 MG: 2 INJECTION INTRAMUSCULAR; INTRAVENOUS at 10:22

## 2019-01-01 RX ADMIN — Medication 10 ML: at 10:16

## 2019-01-01 RX ADMIN — THERA TABS 1 TABLET: TAB at 10:12

## 2019-01-01 RX ADMIN — TIZANIDINE 2 MG: 4 TABLET ORAL at 21:31

## 2019-01-01 RX ADMIN — PAROXETINE HYDROCHLORIDE HEMIHYDRATE 40 MG: 20 TABLET, FILM COATED ORAL at 08:29

## 2019-01-01 RX ADMIN — ENOXAPARIN SODIUM 40 MG: 40 INJECTION SUBCUTANEOUS at 08:30

## 2019-01-01 RX ADMIN — CETIRIZINE HYDROCHLORIDE 5 MG: 5 TABLET, FILM COATED ORAL at 08:22

## 2019-01-01 RX ADMIN — Medication 10 ML: at 21:22

## 2019-01-01 RX ADMIN — HYDROCODONE BITARTRATE AND ACETAMINOPHEN 2 TABLET: 5; 325 TABLET ORAL at 18:33

## 2019-01-01 RX ADMIN — PANTOPRAZOLE SODIUM 40 MG: 40 TABLET, DELAYED RELEASE ORAL at 08:29

## 2019-01-01 RX ADMIN — BUPROPION HYDROCHLORIDE 150 MG: 150 TABLET, EXTENDED RELEASE ORAL at 08:30

## 2019-01-01 RX ADMIN — CEFTRIAXONE 1 G: 1 INJECTION, POWDER, FOR SOLUTION INTRAMUSCULAR; INTRAVENOUS at 08:30

## 2019-01-01 RX ADMIN — CETIRIZINE HYDROCHLORIDE 5 MG: 5 TABLET, FILM COATED ORAL at 08:30

## 2019-01-01 RX ADMIN — PANTOPRAZOLE SODIUM 40 MG: 40 TABLET, DELAYED RELEASE ORAL at 21:31

## 2019-01-01 RX ADMIN — LISINOPRIL 2.5 MG: 2.5 TABLET ORAL at 08:22

## 2019-01-01 RX ADMIN — FUROSEMIDE 20 MG: 10 INJECTION, SOLUTION INTRAMUSCULAR; INTRAVENOUS at 18:36

## 2019-01-01 RX ADMIN — BUPROPION HYDROCHLORIDE 150 MG: 150 TABLET, EXTENDED RELEASE ORAL at 08:22

## 2019-01-01 RX ADMIN — PANTOPRAZOLE SODIUM 40 MG: 40 TABLET, DELAYED RELEASE ORAL at 08:22

## 2019-01-01 RX ADMIN — MORPHINE SULFATE 4 MG: 4 INJECTION, SOLUTION INTRAMUSCULAR; INTRAVENOUS at 10:22

## 2019-01-01 RX ADMIN — IPRATROPIUM BROMIDE AND ALBUTEROL SULFATE 1 AMPULE: .5; 3 SOLUTION RESPIRATORY (INHALATION) at 08:54

## 2019-01-01 RX ADMIN — CEFTRIAXONE 1 G: 1 INJECTION, POWDER, FOR SOLUTION INTRAMUSCULAR; INTRAVENOUS at 12:56

## 2019-01-01 RX ADMIN — ENOXAPARIN SODIUM 40 MG: 40 INJECTION SUBCUTANEOUS at 08:23

## 2019-01-01 RX ADMIN — IPRATROPIUM BROMIDE AND ALBUTEROL SULFATE 1 AMPULE: .5; 3 SOLUTION RESPIRATORY (INHALATION) at 16:39

## 2019-01-01 RX ADMIN — AZITHROMYCIN DIHYDRATE 500 MG: 500 INJECTION, POWDER, LYOPHILIZED, FOR SOLUTION INTRAVENOUS at 09:30

## 2019-01-01 RX ADMIN — TIZANIDINE 2 MG: 4 TABLET ORAL at 20:59

## 2019-01-01 RX ADMIN — MORPHINE SULFATE 4 MG: 4 INJECTION, SOLUTION INTRAMUSCULAR; INTRAVENOUS at 12:14

## 2019-01-01 RX ADMIN — Medication 10 ML: at 08:31

## 2019-01-01 RX ADMIN — PANTOPRAZOLE SODIUM 40 MG: 40 TABLET, DELAYED RELEASE ORAL at 21:00

## 2019-01-01 RX ADMIN — SODIUM CHLORIDE: 9 INJECTION, SOLUTION INTRAVENOUS at 11:50

## 2019-01-01 RX ADMIN — SOTALOL HYDROCHLORIDE 80 MG: 80 TABLET ORAL at 21:30

## 2019-01-01 RX ADMIN — SODIUM CHLORIDE 500 ML: 9 INJECTION, SOLUTION INTRAVENOUS at 10:23

## 2019-01-01 RX ADMIN — Medication 10 ML: at 16:36

## 2019-01-01 RX ADMIN — FUROSEMIDE 20 MG: 10 INJECTION, SOLUTION INTRAMUSCULAR; INTRAVENOUS at 11:45

## 2019-01-01 RX ADMIN — Medication 500 MG: at 08:22

## 2019-01-01 RX ADMIN — Medication 500 MG: at 10:13

## 2019-01-01 RX ADMIN — ENOXAPARIN SODIUM 40 MG: 40 INJECTION SUBCUTANEOUS at 10:11

## 2019-01-01 RX ADMIN — GABAPENTIN 600 MG: 600 TABLET, FILM COATED ORAL at 21:00

## 2019-01-01 RX ADMIN — PRAMIPEXOLE DIHYDROCHLORIDE 0.25 MG: 0.25 TABLET ORAL at 21:31

## 2019-01-01 RX ADMIN — SOTALOL HYDROCHLORIDE 80 MG: 80 TABLET ORAL at 08:29

## 2019-01-01 RX ADMIN — ASPIRIN 81 MG: 81 TABLET ORAL at 08:22

## 2019-01-01 RX ADMIN — CYANOCOBALAMIN TAB 1000 MCG 5000 MCG: 1000 TAB at 08:22

## 2019-01-01 RX ADMIN — THERA TABS 1 TABLET: TAB at 08:22

## 2019-01-01 RX ADMIN — GABAPENTIN 600 MG: 600 TABLET, FILM COATED ORAL at 21:30

## 2019-01-01 RX ADMIN — SOTALOL HYDROCHLORIDE 80 MG: 80 TABLET ORAL at 10:12

## 2019-01-01 RX ADMIN — LISINOPRIL 5 MG: 5 TABLET ORAL at 10:13

## 2019-01-01 RX ADMIN — TIZANIDINE 2 MG: 4 TABLET ORAL at 21:22

## 2019-01-01 RX ADMIN — SOTALOL HYDROCHLORIDE 80 MG: 80 TABLET ORAL at 21:22

## 2019-01-01 RX ADMIN — FUROSEMIDE 20 MG: 10 INJECTION, SOLUTION INTRAMUSCULAR; INTRAVENOUS at 10:11

## 2019-01-01 RX ADMIN — PAROXETINE HYDROCHLORIDE HEMIHYDRATE 40 MG: 20 TABLET, FILM COATED ORAL at 10:13

## 2019-01-01 RX ADMIN — BUPROPION HYDROCHLORIDE 150 MG: 150 TABLET, EXTENDED RELEASE ORAL at 10:12

## 2019-01-01 RX ADMIN — GABAPENTIN 600 MG: 600 TABLET, FILM COATED ORAL at 21:22

## 2019-01-01 RX ADMIN — AZITHROMYCIN MONOHYDRATE 500 MG: 500 INJECTION, POWDER, LYOPHILIZED, FOR SOLUTION INTRAVENOUS at 14:48

## 2019-01-01 RX ADMIN — PANTOPRAZOLE SODIUM 40 MG: 40 TABLET, DELAYED RELEASE ORAL at 10:13

## 2019-01-01 RX ADMIN — ENOXAPARIN SODIUM 40 MG: 40 INJECTION SUBCUTANEOUS at 15:53

## 2019-01-01 RX ADMIN — PRAMIPEXOLE DIHYDROCHLORIDE 0.25 MG: 0.25 TABLET ORAL at 21:22

## 2019-01-01 RX ADMIN — IOHEXOL 50 ML: 240 INJECTION, SOLUTION INTRATHECAL; INTRAVASCULAR; INTRAVENOUS; ORAL at 17:15

## 2019-01-01 RX ADMIN — IPRATROPIUM BROMIDE AND ALBUTEROL SULFATE 1 AMPULE: .5; 3 SOLUTION RESPIRATORY (INHALATION) at 20:13

## 2019-01-01 RX ADMIN — LISINOPRIL 2.5 MG: 2.5 TABLET ORAL at 16:35

## 2019-01-01 RX ADMIN — ONDANSETRON 4 MG: 2 INJECTION INTRAMUSCULAR; INTRAVENOUS at 15:47

## 2019-01-01 RX ADMIN — GABAPENTIN 600 MG: 600 TABLET, FILM COATED ORAL at 08:29

## 2019-01-01 RX ADMIN — GABAPENTIN 600 MG: 600 TABLET, FILM COATED ORAL at 10:12

## 2019-01-01 RX ADMIN — IPRATROPIUM BROMIDE AND ALBUTEROL SULFATE 1 AMPULE: .5; 3 SOLUTION RESPIRATORY (INHALATION) at 08:48

## 2019-01-01 RX ADMIN — FUROSEMIDE 20 MG: 10 INJECTION, SOLUTION INTRAMUSCULAR; INTRAVENOUS at 17:39

## 2019-01-01 RX ADMIN — IPRATROPIUM BROMIDE AND ALBUTEROL SULFATE 1 AMPULE: .5; 3 SOLUTION RESPIRATORY (INHALATION) at 12:25

## 2019-01-01 RX ADMIN — HYDROMORPHONE HYDROCHLORIDE 0.5 MG: 1 INJECTION, SOLUTION INTRAMUSCULAR; INTRAVENOUS; SUBCUTANEOUS at 15:47

## 2019-01-01 RX ADMIN — Medication 10 ML: at 18:36

## 2019-01-01 RX ADMIN — ASPIRIN 81 MG: 81 TABLET ORAL at 10:12

## 2019-01-01 RX ADMIN — GABAPENTIN 600 MG: 600 TABLET, FILM COATED ORAL at 08:22

## 2019-01-01 RX ADMIN — CYANOCOBALAMIN TAB 1000 MCG 5000 MCG: 1000 TAB at 10:13

## 2019-01-01 RX ADMIN — PRAMIPEXOLE DIHYDROCHLORIDE 0.25 MG: 0.25 TABLET ORAL at 20:59

## 2019-01-01 RX ADMIN — FUROSEMIDE 20 MG: 10 INJECTION, SOLUTION INTRAMUSCULAR; INTRAVENOUS at 16:35

## 2019-01-01 RX ADMIN — Medication 10 ML: at 17:39

## 2019-01-01 RX ADMIN — IPRATROPIUM BROMIDE AND ALBUTEROL SULFATE 1 AMPULE: .5; 3 SOLUTION RESPIRATORY (INHALATION) at 08:20

## 2019-01-01 RX ADMIN — PAROXETINE HYDROCHLORIDE HEMIHYDRATE 40 MG: 20 TABLET, FILM COATED ORAL at 08:22

## 2019-01-01 RX ADMIN — IOHEXOL 50 ML: 240 INJECTION, SOLUTION INTRATHECAL; INTRAVASCULAR; INTRAVENOUS; ORAL at 11:26

## 2019-01-01 RX ADMIN — FUROSEMIDE 20 MG: 10 INJECTION, SOLUTION INTRAMUSCULAR; INTRAVENOUS at 08:23

## 2019-01-01 RX ADMIN — CETIRIZINE HYDROCHLORIDE 5 MG: 5 TABLET, FILM COATED ORAL at 10:18

## 2019-01-01 RX ADMIN — Medication 500 MG: at 08:30

## 2019-01-01 RX ADMIN — SODIUM CHLORIDE 500 ML: 9 INJECTION, SOLUTION INTRAVENOUS at 15:33

## 2019-01-01 RX ADMIN — IPRATROPIUM BROMIDE AND ALBUTEROL SULFATE 1 AMPULE: .5; 3 SOLUTION RESPIRATORY (INHALATION) at 12:26

## 2019-01-01 RX ADMIN — SOTALOL HYDROCHLORIDE 80 MG: 80 TABLET ORAL at 20:59

## 2019-01-01 RX ADMIN — CYANOCOBALAMIN TAB 1000 MCG 5000 MCG: 1000 TAB at 08:30

## 2019-01-01 RX ADMIN — Medication 10 ML: at 21:32

## 2019-01-01 RX ADMIN — THERA TABS 1 TABLET: TAB at 08:29

## 2019-01-01 ASSESSMENT — PAIN DESCRIPTION - ORIENTATION
ORIENTATION: RIGHT
ORIENTATION: RIGHT

## 2019-01-01 ASSESSMENT — ENCOUNTER SYMPTOMS
CONSTIPATION: 0
SORE THROAT: 0
EYE DISCHARGE: 0
VOMITING: 1
ABDOMINAL PAIN: 1
SHORTNESS OF BREATH: 1
TROUBLE SWALLOWING: 0
DIARRHEA: 0
NAUSEA: 0
CONSTIPATION: 0
WHEEZING: 0
BACK PAIN: 0
TROUBLE SWALLOWING: 0
ABDOMINAL PAIN: 1
WHEEZING: 0
NAUSEA: 0
EYE REDNESS: 0
ABDOMINAL DISTENTION: 0
DIARRHEA: 0
COUGH: 0
SINUS PRESSURE: 0
SORE THROAT: 0
COUGH: 0
VOMITING: 0
VOICE CHANGE: 0
RHINORRHEA: 0
SHORTNESS OF BREATH: 0

## 2019-01-01 ASSESSMENT — PAIN SCALES - GENERAL
PAINLEVEL_OUTOF10: 8
PAINLEVEL_OUTOF10: 0
PAINLEVEL_OUTOF10: 8
PAINLEVEL_OUTOF10: 0
PAINLEVEL_OUTOF10: 8
PAINLEVEL_OUTOF10: 0
PAINLEVEL_OUTOF10: 3
PAINLEVEL_OUTOF10: 0
PAINLEVEL_OUTOF10: 7
PAINLEVEL_OUTOF10: 3
PAINLEVEL_OUTOF10: 4
PAINLEVEL_OUTOF10: 3
PAINLEVEL_OUTOF10: 8
PAINLEVEL_OUTOF10: 0
PAINLEVEL_OUTOF10: 7
PAINLEVEL_OUTOF10: 0
PAINLEVEL_OUTOF10: 0

## 2019-01-01 ASSESSMENT — PAIN SCALES - WONG BAKER: WONGBAKER_NUMERICALRESPONSE: 8

## 2019-01-01 ASSESSMENT — PAIN DESCRIPTION - LOCATION
LOCATION: GROIN
LOCATION: GROIN

## 2019-01-01 ASSESSMENT — PAIN DESCRIPTION - DESCRIPTORS: DESCRIPTORS: SHARP

## 2019-01-01 ASSESSMENT — PAIN DESCRIPTION - PAIN TYPE: TYPE: ACUTE PAIN

## 2019-02-21 DIAGNOSIS — G62.9 PERIPHERAL POLYNEUROPATHY: ICD-10-CM

## 2019-02-21 RX ORDER — GABAPENTIN 600 MG/1
600 TABLET ORAL 2 TIMES DAILY
Qty: 180 TABLET | Refills: 0 | Status: SHIPPED | OUTPATIENT
Start: 2019-02-21 | End: 2019-05-24 | Stop reason: SDUPTHER

## 2019-03-01 ENCOUNTER — OFFICE VISIT (OUTPATIENT)
Dept: PRIMARY CARE CLINIC | Age: 79
End: 2019-03-01

## 2019-03-01 ENCOUNTER — APPOINTMENT (OUTPATIENT)
Dept: INTERVENTIONAL RADIOLOGY/VASCULAR | Age: 79
DRG: 291 | End: 2019-03-01
Payer: MEDICARE

## 2019-03-01 ENCOUNTER — APPOINTMENT (OUTPATIENT)
Dept: GENERAL RADIOLOGY | Age: 79
DRG: 291 | End: 2019-03-01
Payer: MEDICARE

## 2019-03-01 ENCOUNTER — TELEPHONE (OUTPATIENT)
Dept: FAMILY MEDICINE CLINIC | Age: 79
End: 2019-03-01

## 2019-03-01 ENCOUNTER — HOSPITAL ENCOUNTER (INPATIENT)
Age: 79
LOS: 2 days | Discharge: HOME | DRG: 291 | End: 2019-03-04
Attending: EMERGENCY MEDICINE | Admitting: FAMILY MEDICINE
Payer: MEDICARE

## 2019-03-01 VITALS
SYSTOLIC BLOOD PRESSURE: 116 MMHG | HEART RATE: 84 BPM | HEIGHT: 72 IN | BODY MASS INDEX: 37.93 KG/M2 | WEIGHT: 280 LBS | TEMPERATURE: 98.8 F | DIASTOLIC BLOOD PRESSURE: 60 MMHG | OXYGEN SATURATION: 93 % | RESPIRATION RATE: 18 BRPM

## 2019-03-01 DIAGNOSIS — I50.9 CONGESTIVE HEART FAILURE, UNSPECIFIED HF CHRONICITY, UNSPECIFIED HEART FAILURE TYPE (HCC): Primary | ICD-10-CM

## 2019-03-01 DIAGNOSIS — R53.83 OTHER FATIGUE: ICD-10-CM

## 2019-03-01 DIAGNOSIS — R60.0 BILATERAL LOWER EXTREMITY EDEMA: ICD-10-CM

## 2019-03-01 DIAGNOSIS — I50.9 ACUTE CONGESTIVE HEART FAILURE, UNSPECIFIED HEART FAILURE TYPE (HCC): Primary | ICD-10-CM

## 2019-03-01 LAB
ABSOLUTE EOS #: 0.2 K/UL (ref 0–0.4)
ABSOLUTE IMMATURE GRANULOCYTE: ABNORMAL K/UL (ref 0–0.3)
ABSOLUTE LYMPH #: 0.7 K/UL (ref 1–4.8)
ABSOLUTE MONO #: 0.3 K/UL (ref 0.1–1.2)
ANION GAP SERPL CALCULATED.3IONS-SCNC: 13 MMOL/L (ref 9–17)
BASOPHILS # BLD: 1 % (ref 0–2)
BASOPHILS ABSOLUTE: 0 K/UL (ref 0–0.2)
BNP INTERPRETATION: ABNORMAL
BUN BLDV-MCNC: 23 MG/DL (ref 8–23)
BUN/CREAT BLD: 15 (ref 9–20)
CALCIUM SERPL-MCNC: 7.7 MG/DL (ref 8.6–10.4)
CHLORIDE BLD-SCNC: 105 MMOL/L (ref 98–107)
CO2: 25 MMOL/L (ref 20–31)
CREAT SERPL-MCNC: 1.51 MG/DL (ref 0.7–1.2)
DIFFERENTIAL TYPE: ABNORMAL
EOSINOPHILS RELATIVE PERCENT: 4 % (ref 1–8)
GFR AFRICAN AMERICAN: 54 ML/MIN
GFR NON-AFRICAN AMERICAN: 45 ML/MIN
GFR SERPL CREATININE-BSD FRML MDRD: ABNORMAL ML/MIN/{1.73_M2}
GFR SERPL CREATININE-BSD FRML MDRD: ABNORMAL ML/MIN/{1.73_M2}
GLUCOSE BLD-MCNC: 154 MG/DL (ref 70–99)
HCT VFR BLD CALC: 33.4 % (ref 41–53)
HEMOGLOBIN: 10.6 G/DL (ref 13.5–17.5)
IMMATURE GRANULOCYTES: ABNORMAL %
LYMPHOCYTES # BLD: 13 % (ref 15–43)
MCH RBC QN AUTO: 28.8 PG (ref 26–34)
MCHC RBC AUTO-ENTMCNC: 31.8 G/DL (ref 31–37)
MCV RBC AUTO: 90.7 FL (ref 80–100)
MONOCYTES # BLD: 6 % (ref 6–14)
NRBC AUTOMATED: ABNORMAL PER 100 WBC
PDW BLD-RTO: 15.8 % (ref 11–14.5)
PLATELET # BLD: 143 K/UL (ref 140–450)
PLATELET ESTIMATE: ABNORMAL
PMV BLD AUTO: 8.2 FL (ref 6–12)
POTASSIUM SERPL-SCNC: 3.8 MMOL/L (ref 3.7–5.3)
PRO-BNP: 4486 PG/ML
RBC # BLD: 3.68 M/UL (ref 4.5–5.9)
RBC # BLD: ABNORMAL 10*6/UL
SEG NEUTROPHILS: 76 % (ref 44–74)
SEGMENTED NEUTROPHILS ABSOLUTE COUNT: 3.8 K/UL (ref 1.8–7.7)
SODIUM BLD-SCNC: 143 MMOL/L (ref 135–144)
TROPONIN INTERP: ABNORMAL
TROPONIN INTERP: ABNORMAL
TROPONIN T: ABNORMAL NG/ML
TROPONIN T: ABNORMAL NG/ML
TROPONIN, HIGH SENSITIVITY: 27 NG/L (ref 0–22)
TROPONIN, HIGH SENSITIVITY: 28 NG/L (ref 0–22)
WBC # BLD: 5.1 K/UL (ref 3.5–11)
WBC # BLD: ABNORMAL 10*3/UL

## 2019-03-01 PROCEDURE — 93005 ELECTROCARDIOGRAM TRACING: CPT

## 2019-03-01 PROCEDURE — 36415 COLL VENOUS BLD VENIPUNCTURE: CPT

## 2019-03-01 PROCEDURE — 99285 EMERGENCY DEPT VISIT HI MDM: CPT

## 2019-03-01 PROCEDURE — 85025 COMPLETE CBC W/AUTO DIFF WBC: CPT

## 2019-03-01 PROCEDURE — 84484 ASSAY OF TROPONIN QUANT: CPT

## 2019-03-01 PROCEDURE — 6360000002 HC RX W HCPCS

## 2019-03-01 PROCEDURE — 93970 EXTREMITY STUDY: CPT

## 2019-03-01 PROCEDURE — 71045 X-RAY EXAM CHEST 1 VIEW: CPT

## 2019-03-01 PROCEDURE — 83880 ASSAY OF NATRIURETIC PEPTIDE: CPT

## 2019-03-01 PROCEDURE — 80048 BASIC METABOLIC PNL TOTAL CA: CPT

## 2019-03-01 RX ORDER — FUROSEMIDE 10 MG/ML
INJECTION INTRAMUSCULAR; INTRAVENOUS
Status: COMPLETED
Start: 2019-03-01 | End: 2019-03-01

## 2019-03-01 RX ADMIN — FUROSEMIDE: 10 INJECTION, SOLUTION INTRAVENOUS at 23:45

## 2019-03-02 ENCOUNTER — APPOINTMENT (OUTPATIENT)
Dept: GENERAL RADIOLOGY | Age: 79
DRG: 291 | End: 2019-03-02
Payer: MEDICARE

## 2019-03-02 PROBLEM — I50.9 HEART FAILURE (HCC): Status: ACTIVE | Noted: 2019-03-02

## 2019-03-02 LAB
ANION GAP SERPL CALCULATED.3IONS-SCNC: 16 MMOL/L (ref 9–17)
BNP INTERPRETATION: ABNORMAL
BUN BLDV-MCNC: 21 MG/DL (ref 8–23)
BUN/CREAT BLD: 14 (ref 9–20)
CALCIUM SERPL-MCNC: 7.8 MG/DL (ref 8.6–10.4)
CHLORIDE BLD-SCNC: 107 MMOL/L (ref 98–107)
CHOLESTEROL/HDL RATIO: 3.7
CHOLESTEROL: 81 MG/DL
CO2: 24 MMOL/L (ref 20–31)
CREAT SERPL-MCNC: 1.5 MG/DL (ref 0.7–1.2)
GFR AFRICAN AMERICAN: 55 ML/MIN
GFR NON-AFRICAN AMERICAN: 45 ML/MIN
GFR SERPL CREATININE-BSD FRML MDRD: ABNORMAL ML/MIN/{1.73_M2}
GFR SERPL CREATININE-BSD FRML MDRD: ABNORMAL ML/MIN/{1.73_M2}
GLUCOSE BLD-MCNC: 125 MG/DL (ref 75–110)
GLUCOSE BLD-MCNC: 130 MG/DL (ref 75–110)
GLUCOSE BLD-MCNC: 179 MG/DL (ref 75–110)
GLUCOSE BLD-MCNC: 184 MG/DL (ref 70–99)
HCT VFR BLD CALC: 33.4 % (ref 41–53)
HDLC SERPL-MCNC: 22 MG/DL
HEMOGLOBIN: 10.6 G/DL (ref 13.5–17.5)
LDL CHOLESTEROL: 43 MG/DL (ref 0–130)
MAGNESIUM: 1.7 MG/DL (ref 1.6–2.6)
MCH RBC QN AUTO: 28.4 PG (ref 26–34)
MCHC RBC AUTO-ENTMCNC: 31.7 G/DL (ref 31–37)
MCV RBC AUTO: 89.5 FL (ref 80–100)
NRBC AUTOMATED: ABNORMAL PER 100 WBC
PDW BLD-RTO: 14.9 % (ref 11–14.5)
PLATELET # BLD: 149 K/UL (ref 140–450)
PMV BLD AUTO: 8.6 FL (ref 6–12)
POTASSIUM SERPL-SCNC: 3.7 MMOL/L (ref 3.7–5.3)
PRO-BNP: 4218 PG/ML
RBC # BLD: 3.72 M/UL (ref 4.5–5.9)
SODIUM BLD-SCNC: 147 MMOL/L (ref 135–144)
TRIGL SERPL-MCNC: 82 MG/DL
TROPONIN INTERP: ABNORMAL
TROPONIN INTERP: ABNORMAL
TROPONIN T: ABNORMAL NG/ML
TROPONIN T: ABNORMAL NG/ML
TROPONIN, HIGH SENSITIVITY: 26 NG/L (ref 0–22)
TROPONIN, HIGH SENSITIVITY: 27 NG/L (ref 0–22)
TSH SERPL DL<=0.05 MIU/L-ACNC: 3.88 MIU/L (ref 0.3–5)
VLDLC SERPL CALC-MCNC: ABNORMAL MG/DL (ref 1–30)
WBC # BLD: 4.3 K/UL (ref 3.5–11)

## 2019-03-02 PROCEDURE — 83735 ASSAY OF MAGNESIUM: CPT

## 2019-03-02 PROCEDURE — 2580000003 HC RX 258: Performed by: NURSE PRACTITIONER

## 2019-03-02 PROCEDURE — 99222 1ST HOSP IP/OBS MODERATE 55: CPT | Performed by: FAMILY MEDICINE

## 2019-03-02 PROCEDURE — 80048 BASIC METABOLIC PNL TOTAL CA: CPT

## 2019-03-02 PROCEDURE — 94761 N-INVAS EAR/PLS OXIMETRY MLT: CPT

## 2019-03-02 PROCEDURE — 84443 ASSAY THYROID STIM HORMONE: CPT

## 2019-03-02 PROCEDURE — 71046 X-RAY EXAM CHEST 2 VIEWS: CPT

## 2019-03-02 PROCEDURE — 36415 COLL VENOUS BLD VENIPUNCTURE: CPT

## 2019-03-02 PROCEDURE — 83880 ASSAY OF NATRIURETIC PEPTIDE: CPT

## 2019-03-02 PROCEDURE — 2060000000 HC ICU INTERMEDIATE R&B

## 2019-03-02 PROCEDURE — 6370000000 HC RX 637 (ALT 250 FOR IP): Performed by: NURSE PRACTITIONER

## 2019-03-02 PROCEDURE — 97161 PT EVAL LOW COMPLEX 20 MIN: CPT | Performed by: PHYSICAL THERAPIST

## 2019-03-02 PROCEDURE — 6370000000 HC RX 637 (ALT 250 FOR IP): Performed by: FAMILY MEDICINE

## 2019-03-02 PROCEDURE — 80061 LIPID PANEL: CPT

## 2019-03-02 PROCEDURE — 6360000002 HC RX W HCPCS: Performed by: NURSE PRACTITIONER

## 2019-03-02 PROCEDURE — 85027 COMPLETE CBC AUTOMATED: CPT

## 2019-03-02 PROCEDURE — 82947 ASSAY GLUCOSE BLOOD QUANT: CPT

## 2019-03-02 PROCEDURE — 84484 ASSAY OF TROPONIN QUANT: CPT

## 2019-03-02 RX ORDER — HYDROCODONE BITARTRATE AND ACETAMINOPHEN 5; 325 MG/1; MG/1
1 TABLET ORAL EVERY 4 HOURS PRN
Status: DISCONTINUED | OUTPATIENT
Start: 2019-03-02 | End: 2019-03-04 | Stop reason: HOSPADM

## 2019-03-02 RX ORDER — CETIRIZINE HYDROCHLORIDE 5 MG/1
5 TABLET ORAL DAILY
Status: DISCONTINUED | OUTPATIENT
Start: 2019-03-02 | End: 2019-03-04 | Stop reason: HOSPADM

## 2019-03-02 RX ORDER — ASPIRIN 81 MG/1
81 TABLET ORAL DAILY
Status: DISCONTINUED | OUTPATIENT
Start: 2019-03-02 | End: 2019-03-04 | Stop reason: HOSPADM

## 2019-03-02 RX ORDER — NICOTINE POLACRILEX 4 MG
15 LOZENGE BUCCAL PRN
Status: DISCONTINUED | OUTPATIENT
Start: 2019-03-02 | End: 2019-03-04 | Stop reason: HOSPADM

## 2019-03-02 RX ORDER — NICOTINE 21 MG/24HR
1 PATCH, TRANSDERMAL 24 HOURS TRANSDERMAL DAILY PRN
Status: DISCONTINUED | OUTPATIENT
Start: 2019-03-02 | End: 2019-03-04 | Stop reason: HOSPADM

## 2019-03-02 RX ORDER — CALCIUM CARBONATE 500(1250)
600 TABLET ORAL DAILY
Status: DISCONTINUED | OUTPATIENT
Start: 2019-03-02 | End: 2019-03-04 | Stop reason: HOSPADM

## 2019-03-02 RX ORDER — ACETAMINOPHEN 325 MG/1
650 TABLET ORAL EVERY 4 HOURS PRN
Status: DISCONTINUED | OUTPATIENT
Start: 2019-03-02 | End: 2019-03-04 | Stop reason: HOSPADM

## 2019-03-02 RX ORDER — SODIUM CHLORIDE 0.9 % (FLUSH) 0.9 %
10 SYRINGE (ML) INJECTION EVERY 12 HOURS SCHEDULED
Status: DISCONTINUED | OUTPATIENT
Start: 2019-03-02 | End: 2019-03-04 | Stop reason: HOSPADM

## 2019-03-02 RX ORDER — FUROSEMIDE 10 MG/ML
40 INJECTION INTRAMUSCULAR; INTRAVENOUS 2 TIMES DAILY
Status: DISCONTINUED | OUTPATIENT
Start: 2019-03-02 | End: 2019-03-04

## 2019-03-02 RX ORDER — DEXTROSE MONOHYDRATE 25 G/50ML
12.5 INJECTION, SOLUTION INTRAVENOUS PRN
Status: DISCONTINUED | OUTPATIENT
Start: 2019-03-02 | End: 2019-03-04 | Stop reason: HOSPADM

## 2019-03-02 RX ORDER — LANOLIN ALCOHOL/MO/W.PET/CERES
5000 CREAM (GRAM) TOPICAL DAILY
Status: DISCONTINUED | OUTPATIENT
Start: 2019-03-02 | End: 2019-03-04 | Stop reason: HOSPADM

## 2019-03-02 RX ORDER — HYDROCODONE BITARTRATE AND ACETAMINOPHEN 5; 325 MG/1; MG/1
2 TABLET ORAL EVERY 4 HOURS PRN
Status: DISCONTINUED | OUTPATIENT
Start: 2019-03-02 | End: 2019-03-04 | Stop reason: HOSPADM

## 2019-03-02 RX ORDER — SOTALOL HYDROCHLORIDE 80 MG/1
80 TABLET ORAL 2 TIMES DAILY
Status: DISCONTINUED | OUTPATIENT
Start: 2019-03-02 | End: 2019-03-04 | Stop reason: HOSPADM

## 2019-03-02 RX ORDER — CARVEDILOL 3.12 MG/1
3.12 TABLET ORAL 2 TIMES DAILY WITH MEALS
Status: DISCONTINUED | OUTPATIENT
Start: 2019-03-03 | End: 2019-03-02

## 2019-03-02 RX ORDER — SODIUM CHLORIDE 0.9 % (FLUSH) 0.9 %
10 SYRINGE (ML) INJECTION PRN
Status: DISCONTINUED | OUTPATIENT
Start: 2019-03-02 | End: 2019-03-04 | Stop reason: HOSPADM

## 2019-03-02 RX ORDER — DEXTROSE MONOHYDRATE 50 MG/ML
100 INJECTION, SOLUTION INTRAVENOUS PRN
Status: DISCONTINUED | OUTPATIENT
Start: 2019-03-02 | End: 2019-03-04 | Stop reason: HOSPADM

## 2019-03-02 RX ORDER — PAROXETINE HYDROCHLORIDE 20 MG/1
40 TABLET, FILM COATED ORAL DAILY
Status: DISCONTINUED | OUTPATIENT
Start: 2019-03-02 | End: 2019-03-04 | Stop reason: HOSPADM

## 2019-03-02 RX ORDER — GABAPENTIN 600 MG/1
600 TABLET ORAL 2 TIMES DAILY
Status: DISCONTINUED | OUTPATIENT
Start: 2019-03-02 | End: 2019-03-04 | Stop reason: HOSPADM

## 2019-03-02 RX ORDER — PANTOPRAZOLE SODIUM 40 MG/1
40 TABLET, DELAYED RELEASE ORAL
Status: DISCONTINUED | OUTPATIENT
Start: 2019-03-02 | End: 2019-03-04 | Stop reason: HOSPADM

## 2019-03-02 RX ADMIN — CYANOCOBALAMIN TAB 1000 MCG 5000 MCG: 1000 TAB at 08:40

## 2019-03-02 RX ADMIN — HYDROCODONE BITARTRATE AND ACETAMINOPHEN 2 TABLET: 5; 325 TABLET ORAL at 17:47

## 2019-03-02 RX ADMIN — PAROXETINE HYDROCHLORIDE HEMIHYDRATE 40 MG: 20 TABLET, FILM COATED ORAL at 08:40

## 2019-03-02 RX ADMIN — GABAPENTIN 600 MG: 600 TABLET, FILM COATED ORAL at 08:40

## 2019-03-02 RX ADMIN — GABAPENTIN 600 MG: 600 TABLET, FILM COATED ORAL at 21:07

## 2019-03-02 RX ADMIN — ENOXAPARIN SODIUM 40 MG: 40 INJECTION SUBCUTANEOUS at 08:41

## 2019-03-02 RX ADMIN — PANTOPRAZOLE SODIUM 40 MG: 40 TABLET, DELAYED RELEASE ORAL at 08:42

## 2019-03-02 RX ADMIN — INSULIN LISPRO 1 UNITS: 100 INJECTION, SOLUTION INTRAVENOUS; SUBCUTANEOUS at 09:13

## 2019-03-02 RX ADMIN — FUROSEMIDE 40 MG: 10 INJECTION, SOLUTION INTRAMUSCULAR; INTRAVENOUS at 08:43

## 2019-03-02 RX ADMIN — CETIRIZINE HYDROCHLORIDE 5 MG: 5 TABLET ORAL at 08:40

## 2019-03-02 RX ADMIN — Medication 10 ML: at 08:41

## 2019-03-02 RX ADMIN — PANTOPRAZOLE SODIUM 40 MG: 40 TABLET, DELAYED RELEASE ORAL at 17:27

## 2019-03-02 RX ADMIN — SOTALOL HYDROCHLORIDE 80 MG: 80 TABLET ORAL at 21:07

## 2019-03-02 RX ADMIN — FUROSEMIDE 40 MG: 10 INJECTION, SOLUTION INTRAMUSCULAR; INTRAVENOUS at 21:07

## 2019-03-02 RX ADMIN — SOTALOL HYDROCHLORIDE 80 MG: 80 TABLET ORAL at 08:40

## 2019-03-02 RX ADMIN — ASPIRIN 81 MG: 81 TABLET, COATED ORAL at 08:40

## 2019-03-02 RX ADMIN — Medication 10 ML: at 21:00

## 2019-03-02 RX ADMIN — Medication 500 MG: at 08:40

## 2019-03-02 ASSESSMENT — ENCOUNTER SYMPTOMS
VOMITING: 0
SHORTNESS OF BREATH: 0

## 2019-03-02 ASSESSMENT — PAIN DESCRIPTION - PAIN TYPE: TYPE: CHRONIC PAIN

## 2019-03-02 ASSESSMENT — PAIN SCALES - GENERAL
PAINLEVEL_OUTOF10: 0
PAINLEVEL_OUTOF10: 9
PAINLEVEL_OUTOF10: 8
PAINLEVEL_OUTOF10: 0

## 2019-03-02 ASSESSMENT — PAIN DESCRIPTION - LOCATION: LOCATION: FOOT;LEG;ANKLE

## 2019-03-02 ASSESSMENT — PAIN DESCRIPTION - ORIENTATION: ORIENTATION: RIGHT;LEFT

## 2019-03-03 LAB
ABSOLUTE EOS #: 0.2 K/UL (ref 0–0.4)
ABSOLUTE IMMATURE GRANULOCYTE: ABNORMAL K/UL (ref 0–0.3)
ABSOLUTE LYMPH #: 0.7 K/UL (ref 1–4.8)
ABSOLUTE MONO #: 0.3 K/UL (ref 0.1–1.2)
ANION GAP SERPL CALCULATED.3IONS-SCNC: 14 MMOL/L (ref 9–17)
BASOPHILS # BLD: 2 % (ref 0–2)
BASOPHILS ABSOLUTE: 0.1 K/UL (ref 0–0.2)
BUN BLDV-MCNC: 21 MG/DL (ref 8–23)
BUN/CREAT BLD: 14 (ref 9–20)
CALCIUM SERPL-MCNC: 7.7 MG/DL (ref 8.6–10.4)
CHLORIDE BLD-SCNC: 101 MMOL/L (ref 98–107)
CO2: 29 MMOL/L (ref 20–31)
CREAT SERPL-MCNC: 1.53 MG/DL (ref 0.7–1.2)
DIFFERENTIAL TYPE: ABNORMAL
EKG ATRIAL RATE: 500 BPM
EKG ATRIAL RATE: 92 BPM
EKG Q-T INTERVAL: 424 MS
EKG Q-T INTERVAL: 430 MS
EKG QRS DURATION: 120 MS
EKG QRS DURATION: 120 MS
EKG QTC CALCULATION (BAZETT): 523 MS
EKG QTC CALCULATION (BAZETT): 524 MS
EKG R AXIS: -2 DEGREES
EKG R AXIS: 14 DEGREES
EKG T AXIS: -124 DEGREES
EKG T AXIS: 161 DEGREES
EKG VENTRICULAR RATE: 89 BPM
EKG VENTRICULAR RATE: 92 BPM
EOSINOPHILS RELATIVE PERCENT: 5 % (ref 1–8)
ESTIMATED AVERAGE GLUCOSE: 157 MG/DL
GFR AFRICAN AMERICAN: 53 ML/MIN
GFR NON-AFRICAN AMERICAN: 44 ML/MIN
GFR SERPL CREATININE-BSD FRML MDRD: ABNORMAL ML/MIN/{1.73_M2}
GFR SERPL CREATININE-BSD FRML MDRD: ABNORMAL ML/MIN/{1.73_M2}
GLUCOSE BLD-MCNC: 126 MG/DL (ref 75–110)
GLUCOSE BLD-MCNC: 142 MG/DL (ref 70–99)
GLUCOSE BLD-MCNC: 147 MG/DL (ref 75–110)
GLUCOSE BLD-MCNC: 157 MG/DL (ref 75–110)
GLUCOSE BLD-MCNC: 161 MG/DL (ref 75–110)
HBA1C MFR BLD: 7.1 % (ref 4.8–5.9)
HCT VFR BLD CALC: 33.1 % (ref 41–53)
HEMOGLOBIN: 10.6 G/DL (ref 13.5–17.5)
IMMATURE GRANULOCYTES: ABNORMAL %
LYMPHOCYTES # BLD: 16 % (ref 15–43)
MCH RBC QN AUTO: 28.6 PG (ref 26–34)
MCHC RBC AUTO-ENTMCNC: 32.1 G/DL (ref 31–37)
MCV RBC AUTO: 89.1 FL (ref 80–100)
MONOCYTES # BLD: 7 % (ref 6–14)
NRBC AUTOMATED: ABNORMAL PER 100 WBC
PDW BLD-RTO: 14.9 % (ref 11–14.5)
PLATELET # BLD: 151 K/UL (ref 140–450)
PLATELET ESTIMATE: ABNORMAL
PMV BLD AUTO: 8.3 FL (ref 6–12)
POTASSIUM SERPL-SCNC: 3.6 MMOL/L (ref 3.7–5.3)
RBC # BLD: 3.71 M/UL (ref 4.5–5.9)
RBC # BLD: ABNORMAL 10*6/UL
SEG NEUTROPHILS: 70 % (ref 44–74)
SEGMENTED NEUTROPHILS ABSOLUTE COUNT: 2.9 K/UL (ref 1.8–7.7)
SODIUM BLD-SCNC: 144 MMOL/L (ref 135–144)
WBC # BLD: 4.2 K/UL (ref 3.5–11)
WBC # BLD: ABNORMAL 10*3/UL

## 2019-03-03 PROCEDURE — 6360000002 HC RX W HCPCS: Performed by: NURSE PRACTITIONER

## 2019-03-03 PROCEDURE — 6370000000 HC RX 637 (ALT 250 FOR IP): Performed by: NURSE PRACTITIONER

## 2019-03-03 PROCEDURE — 83036 HEMOGLOBIN GLYCOSYLATED A1C: CPT

## 2019-03-03 PROCEDURE — 82947 ASSAY GLUCOSE BLOOD QUANT: CPT

## 2019-03-03 PROCEDURE — 80048 BASIC METABOLIC PNL TOTAL CA: CPT

## 2019-03-03 PROCEDURE — 99232 SBSQ HOSP IP/OBS MODERATE 35: CPT | Performed by: FAMILY MEDICINE

## 2019-03-03 PROCEDURE — 94761 N-INVAS EAR/PLS OXIMETRY MLT: CPT

## 2019-03-03 PROCEDURE — 36415 COLL VENOUS BLD VENIPUNCTURE: CPT

## 2019-03-03 PROCEDURE — 2060000000 HC ICU INTERMEDIATE R&B

## 2019-03-03 PROCEDURE — 97110 THERAPEUTIC EXERCISES: CPT

## 2019-03-03 PROCEDURE — 2580000003 HC RX 258: Performed by: NURSE PRACTITIONER

## 2019-03-03 PROCEDURE — 85025 COMPLETE CBC W/AUTO DIFF WBC: CPT

## 2019-03-03 RX ADMIN — GABAPENTIN 600 MG: 600 TABLET, FILM COATED ORAL at 21:26

## 2019-03-03 RX ADMIN — INSULIN LISPRO 1 UNITS: 100 INJECTION, SOLUTION INTRAVENOUS; SUBCUTANEOUS at 16:54

## 2019-03-03 RX ADMIN — ENOXAPARIN SODIUM 40 MG: 40 INJECTION SUBCUTANEOUS at 08:26

## 2019-03-03 RX ADMIN — FUROSEMIDE 40 MG: 10 INJECTION, SOLUTION INTRAMUSCULAR; INTRAVENOUS at 08:26

## 2019-03-03 RX ADMIN — CETIRIZINE HYDROCHLORIDE 5 MG: 5 TABLET ORAL at 08:26

## 2019-03-03 RX ADMIN — SOTALOL HYDROCHLORIDE 80 MG: 80 TABLET ORAL at 08:25

## 2019-03-03 RX ADMIN — INSULIN LISPRO 1 UNITS: 100 INJECTION, SOLUTION INTRAVENOUS; SUBCUTANEOUS at 08:25

## 2019-03-03 RX ADMIN — PANTOPRAZOLE SODIUM 40 MG: 40 TABLET, DELAYED RELEASE ORAL at 06:21

## 2019-03-03 RX ADMIN — Medication 10 ML: at 08:26

## 2019-03-03 RX ADMIN — PANTOPRAZOLE SODIUM 40 MG: 40 TABLET, DELAYED RELEASE ORAL at 16:54

## 2019-03-03 RX ADMIN — ASPIRIN 81 MG: 81 TABLET, COATED ORAL at 08:25

## 2019-03-03 RX ADMIN — PAROXETINE HYDROCHLORIDE HEMIHYDRATE 40 MG: 20 TABLET, FILM COATED ORAL at 08:25

## 2019-03-03 RX ADMIN — CYANOCOBALAMIN TAB 1000 MCG 5000 MCG: 1000 TAB at 08:25

## 2019-03-03 RX ADMIN — FUROSEMIDE 40 MG: 10 INJECTION, SOLUTION INTRAMUSCULAR; INTRAVENOUS at 21:26

## 2019-03-03 RX ADMIN — Medication 500 MG: at 08:25

## 2019-03-03 RX ADMIN — GABAPENTIN 600 MG: 600 TABLET, FILM COATED ORAL at 08:25

## 2019-03-03 RX ADMIN — Medication 10 ML: at 21:27

## 2019-03-03 RX ADMIN — SOTALOL HYDROCHLORIDE 80 MG: 80 TABLET ORAL at 21:26

## 2019-03-03 RX ADMIN — INSULIN LISPRO 1 UNITS: 100 INJECTION, SOLUTION INTRAVENOUS; SUBCUTANEOUS at 11:50

## 2019-03-03 ASSESSMENT — PAIN DESCRIPTION - LOCATION: LOCATION: LEG

## 2019-03-03 ASSESSMENT — PAIN SCALES - GENERAL
PAINLEVEL_OUTOF10: 0
PAINLEVEL_OUTOF10: 6
PAINLEVEL_OUTOF10: 0

## 2019-03-03 ASSESSMENT — PAIN DESCRIPTION - ORIENTATION: ORIENTATION: RIGHT;LEFT

## 2019-03-03 ASSESSMENT — PAIN DESCRIPTION - PAIN TYPE: TYPE: CHRONIC PAIN

## 2019-03-04 ENCOUNTER — TELEPHONE (OUTPATIENT)
Dept: FAMILY MEDICINE CLINIC | Age: 79
End: 2019-03-04

## 2019-03-04 VITALS
DIASTOLIC BLOOD PRESSURE: 56 MMHG | HEART RATE: 92 BPM | SYSTOLIC BLOOD PRESSURE: 100 MMHG | OXYGEN SATURATION: 96 % | RESPIRATION RATE: 18 BRPM | HEIGHT: 72 IN | WEIGHT: 263.8 LBS | TEMPERATURE: 97 F | BODY MASS INDEX: 35.73 KG/M2

## 2019-03-04 LAB
ABSOLUTE EOS #: 0.3 K/UL (ref 0–0.4)
ABSOLUTE IMMATURE GRANULOCYTE: ABNORMAL K/UL (ref 0–0.3)
ABSOLUTE LYMPH #: 0.8 K/UL (ref 1–4.8)
ABSOLUTE MONO #: 0.4 K/UL (ref 0.1–1.2)
ALBUMIN SERPL-MCNC: 3.5 G/DL (ref 3.5–5.2)
ANION GAP SERPL CALCULATED.3IONS-SCNC: 12 MMOL/L (ref 9–17)
BASOPHILS # BLD: 1 % (ref 0–2)
BASOPHILS ABSOLUTE: 0.1 K/UL (ref 0–0.2)
BNP INTERPRETATION: ABNORMAL
BUN BLDV-MCNC: 23 MG/DL (ref 8–23)
BUN/CREAT BLD: 14 (ref 9–20)
CALCIUM SERPL-MCNC: 7.6 MG/DL (ref 8.6–10.4)
CHLORIDE BLD-SCNC: 102 MMOL/L (ref 98–107)
CO2: 32 MMOL/L (ref 20–31)
CREAT SERPL-MCNC: 1.6 MG/DL (ref 0.7–1.2)
DIFFERENTIAL TYPE: ABNORMAL
EOSINOPHILS RELATIVE PERCENT: 6 % (ref 1–8)
GFR AFRICAN AMERICAN: 51 ML/MIN
GFR NON-AFRICAN AMERICAN: 42 ML/MIN
GFR SERPL CREATININE-BSD FRML MDRD: ABNORMAL ML/MIN/{1.73_M2}
GFR SERPL CREATININE-BSD FRML MDRD: ABNORMAL ML/MIN/{1.73_M2}
GLUCOSE BLD-MCNC: 130 MG/DL (ref 75–110)
GLUCOSE BLD-MCNC: 168 MG/DL (ref 70–99)
GLUCOSE BLD-MCNC: 85 MG/DL (ref 75–110)
HCT VFR BLD CALC: 34.4 % (ref 41–53)
HEMOGLOBIN: 10.9 G/DL (ref 13.5–17.5)
IMMATURE GRANULOCYTES: ABNORMAL %
LV EF: 25 %
LVEF MODALITY: NORMAL
LYMPHOCYTES # BLD: 16 % (ref 15–43)
MCH RBC QN AUTO: 28.3 PG (ref 26–34)
MCHC RBC AUTO-ENTMCNC: 31.7 G/DL (ref 31–37)
MCV RBC AUTO: 89.2 FL (ref 80–100)
MONOCYTES # BLD: 8 % (ref 6–14)
NRBC AUTOMATED: ABNORMAL PER 100 WBC
PDW BLD-RTO: 14.9 % (ref 11–14.5)
PLATELET # BLD: 168 K/UL (ref 140–450)
PLATELET ESTIMATE: ABNORMAL
PMV BLD AUTO: 8.2 FL (ref 6–12)
POTASSIUM SERPL-SCNC: 3.8 MMOL/L (ref 3.7–5.3)
PRO-BNP: 2529 PG/ML
RBC # BLD: 3.86 M/UL (ref 4.5–5.9)
RBC # BLD: ABNORMAL 10*6/UL
SEG NEUTROPHILS: 69 % (ref 44–74)
SEGMENTED NEUTROPHILS ABSOLUTE COUNT: 3.4 K/UL (ref 1.8–7.7)
SODIUM BLD-SCNC: 146 MMOL/L (ref 135–144)
WBC # BLD: 4.9 K/UL (ref 3.5–11)
WBC # BLD: ABNORMAL 10*3/UL

## 2019-03-04 PROCEDURE — 80048 BASIC METABOLIC PNL TOTAL CA: CPT

## 2019-03-04 PROCEDURE — 99238 HOSP IP/OBS DSCHRG MGMT 30/<: CPT | Performed by: INTERNAL MEDICINE

## 2019-03-04 PROCEDURE — 2500000003 HC RX 250 WO HCPCS: Performed by: INTERNAL MEDICINE

## 2019-03-04 PROCEDURE — 83880 ASSAY OF NATRIURETIC PEPTIDE: CPT

## 2019-03-04 PROCEDURE — 85025 COMPLETE CBC W/AUTO DIFF WBC: CPT

## 2019-03-04 PROCEDURE — 97110 THERAPEUTIC EXERCISES: CPT

## 2019-03-04 PROCEDURE — 6370000000 HC RX 637 (ALT 250 FOR IP): Performed by: INTERNAL MEDICINE

## 2019-03-04 PROCEDURE — 2580000003 HC RX 258: Performed by: INTERNAL MEDICINE

## 2019-03-04 PROCEDURE — 6360000002 HC RX W HCPCS: Performed by: NURSE PRACTITIONER

## 2019-03-04 PROCEDURE — 94760 N-INVAS EAR/PLS OXIMETRY 1: CPT

## 2019-03-04 PROCEDURE — 82040 ASSAY OF SERUM ALBUMIN: CPT

## 2019-03-04 PROCEDURE — 97165 OT EVAL LOW COMPLEX 30 MIN: CPT | Performed by: OCCUPATIONAL THERAPIST

## 2019-03-04 PROCEDURE — 82947 ASSAY GLUCOSE BLOOD QUANT: CPT

## 2019-03-04 PROCEDURE — 2580000003 HC RX 258: Performed by: NURSE PRACTITIONER

## 2019-03-04 PROCEDURE — 6370000000 HC RX 637 (ALT 250 FOR IP): Performed by: FAMILY MEDICINE

## 2019-03-04 PROCEDURE — 36415 COLL VENOUS BLD VENIPUNCTURE: CPT

## 2019-03-04 PROCEDURE — 6370000000 HC RX 637 (ALT 250 FOR IP): Performed by: NURSE PRACTITIONER

## 2019-03-04 PROCEDURE — 93306 TTE W/DOPPLER COMPLETE: CPT

## 2019-03-04 RX ORDER — INSULIN GLARGINE 100 [IU]/ML
30 INJECTION, SOLUTION SUBCUTANEOUS DAILY
Status: DISCONTINUED | OUTPATIENT
Start: 2019-03-04 | End: 2019-03-04 | Stop reason: HOSPADM

## 2019-03-04 RX ORDER — FUROSEMIDE 10 MG/ML
20 INJECTION INTRAMUSCULAR; INTRAVENOUS 2 TIMES DAILY
Status: DISCONTINUED | OUTPATIENT
Start: 2019-03-04 | End: 2019-03-04 | Stop reason: HOSPADM

## 2019-03-04 RX ADMIN — FUROSEMIDE 40 MG: 10 INJECTION, SOLUTION INTRAMUSCULAR; INTRAVENOUS at 07:56

## 2019-03-04 RX ADMIN — PANTOPRAZOLE SODIUM 40 MG: 40 TABLET, DELAYED RELEASE ORAL at 15:50

## 2019-03-04 RX ADMIN — PAROXETINE HYDROCHLORIDE HEMIHYDRATE 40 MG: 20 TABLET, FILM COATED ORAL at 07:56

## 2019-03-04 RX ADMIN — INSULIN LISPRO 1 UNITS: 100 INJECTION, SOLUTION INTRAVENOUS; SUBCUTANEOUS at 07:58

## 2019-03-04 RX ADMIN — Medication 500 MG: at 07:56

## 2019-03-04 RX ADMIN — INSULIN GLARGINE 30 UNITS: 100 INJECTION, SOLUTION SUBCUTANEOUS at 13:33

## 2019-03-04 RX ADMIN — ASPIRIN 81 MG: 81 TABLET, COATED ORAL at 07:55

## 2019-03-04 RX ADMIN — SOTALOL HYDROCHLORIDE 80 MG: 80 TABLET ORAL at 07:56

## 2019-03-04 RX ADMIN — INSULIN LISPRO 10 UNITS: 100 INJECTION, SOLUTION INTRAVENOUS; SUBCUTANEOUS at 13:33

## 2019-03-04 RX ADMIN — HYDROCODONE BITARTRATE AND ACETAMINOPHEN 2 TABLET: 5; 325 TABLET ORAL at 07:58

## 2019-03-04 RX ADMIN — ENOXAPARIN SODIUM 40 MG: 40 INJECTION SUBCUTANEOUS at 07:56

## 2019-03-04 RX ADMIN — GABAPENTIN 600 MG: 600 TABLET, FILM COATED ORAL at 07:55

## 2019-03-04 RX ADMIN — CETIRIZINE HYDROCHLORIDE 5 MG: 5 TABLET ORAL at 08:01

## 2019-03-04 RX ADMIN — PANTOPRAZOLE SODIUM 40 MG: 40 TABLET, DELAYED RELEASE ORAL at 05:31

## 2019-03-04 RX ADMIN — CALCIUM CHLORIDE 1 G: 100 INJECTION, SOLUTION INTRAVENOUS at 10:35

## 2019-03-04 RX ADMIN — CYANOCOBALAMIN TAB 1000 MCG 5000 MCG: 1000 TAB at 10:35

## 2019-03-04 RX ADMIN — Medication 10 ML: at 07:58

## 2019-03-04 ASSESSMENT — PAIN DESCRIPTION - LOCATION
LOCATION: LEG

## 2019-03-04 ASSESSMENT — PAIN SCALES - GENERAL
PAINLEVEL_OUTOF10: 6
PAINLEVEL_OUTOF10: 0
PAINLEVEL_OUTOF10: 7
PAINLEVEL_OUTOF10: 0
PAINLEVEL_OUTOF10: 5
PAINLEVEL_OUTOF10: 0

## 2019-03-04 ASSESSMENT — PAIN DESCRIPTION - PAIN TYPE
TYPE: CHRONIC PAIN

## 2019-03-04 ASSESSMENT — PAIN DESCRIPTION - DESCRIPTORS: DESCRIPTORS: ACHING;DULL

## 2019-03-04 ASSESSMENT — PAIN DESCRIPTION - ORIENTATION
ORIENTATION: RIGHT;LEFT
ORIENTATION: LEFT;RIGHT;POSTERIOR;LOWER
ORIENTATION: RIGHT;LEFT;LOWER;POSTERIOR

## 2019-03-04 ASSESSMENT — PAIN DESCRIPTION - FREQUENCY
FREQUENCY: CONTINUOUS
FREQUENCY: CONTINUOUS

## 2019-03-04 ASSESSMENT — PAIN DESCRIPTION - PROGRESSION: CLINICAL_PROGRESSION: NOT CHANGED

## 2019-03-04 ASSESSMENT — PAIN DESCRIPTION - ONSET: ONSET: ON-GOING

## 2019-03-05 ENCOUNTER — CARE COORDINATION (OUTPATIENT)
Dept: CASE MANAGEMENT | Age: 79
End: 2019-03-05

## 2019-03-06 ENCOUNTER — CARE COORDINATION (OUTPATIENT)
Dept: CASE MANAGEMENT | Age: 79
End: 2019-03-06

## 2019-03-08 ENCOUNTER — CARE COORDINATION (OUTPATIENT)
Dept: CASE MANAGEMENT | Age: 79
End: 2019-03-08

## 2019-03-15 ENCOUNTER — OFFICE VISIT (OUTPATIENT)
Dept: FAMILY MEDICINE CLINIC | Age: 79
End: 2019-03-15
Payer: MEDICARE

## 2019-03-15 VITALS
BODY MASS INDEX: 37.79 KG/M2 | SYSTOLIC BLOOD PRESSURE: 110 MMHG | HEIGHT: 72 IN | DIASTOLIC BLOOD PRESSURE: 64 MMHG | HEART RATE: 84 BPM | OXYGEN SATURATION: 96 % | RESPIRATION RATE: 18 BRPM | WEIGHT: 279 LBS

## 2019-03-15 DIAGNOSIS — I50.41 ACUTE COMBINED SYSTOLIC AND DIASTOLIC CONGESTIVE HEART FAILURE (HCC): Primary | ICD-10-CM

## 2019-03-15 DIAGNOSIS — N18.30 CHRONIC KIDNEY DISEASE (CKD), STAGE III (MODERATE) (HCC): ICD-10-CM

## 2019-03-15 DIAGNOSIS — I10 ESSENTIAL HYPERTENSION: ICD-10-CM

## 2019-03-15 DIAGNOSIS — N18.30 TYPE 2 DIABETES MELLITUS WITH STAGE 3 CHRONIC KIDNEY DISEASE, WITHOUT LONG-TERM CURRENT USE OF INSULIN (HCC): ICD-10-CM

## 2019-03-15 DIAGNOSIS — F32.1 CURRENT MODERATE EPISODE OF MAJOR DEPRESSIVE DISORDER WITHOUT PRIOR EPISODE (HCC): ICD-10-CM

## 2019-03-15 DIAGNOSIS — I42.0 DILATED CARDIOMYOPATHY (HCC): ICD-10-CM

## 2019-03-15 DIAGNOSIS — M15.9 PRIMARY OSTEOARTHRITIS INVOLVING MULTIPLE JOINTS: ICD-10-CM

## 2019-03-15 DIAGNOSIS — F32.1 MAJOR DEPRESSIVE DISORDER, SINGLE EPISODE, MODERATE (HCC): ICD-10-CM

## 2019-03-15 DIAGNOSIS — G62.9 PERIPHERAL POLYNEUROPATHY: ICD-10-CM

## 2019-03-15 DIAGNOSIS — E11.22 TYPE 2 DIABETES MELLITUS WITH STAGE 3 CHRONIC KIDNEY DISEASE, WITHOUT LONG-TERM CURRENT USE OF INSULIN (HCC): ICD-10-CM

## 2019-03-15 PROCEDURE — 1111F DSCHRG MED/CURRENT MED MERGE: CPT | Performed by: FAMILY MEDICINE

## 2019-03-15 PROCEDURE — 99495 TRANSJ CARE MGMT MOD F2F 14D: CPT | Performed by: FAMILY MEDICINE

## 2019-03-15 RX ORDER — HYDROCODONE BITARTRATE AND ACETAMINOPHEN 5; 325 MG/1; MG/1
1 TABLET ORAL EVERY 8 HOURS PRN
Qty: 90 TABLET | Refills: 0 | Status: SHIPPED | OUTPATIENT
Start: 2019-03-15 | End: 2019-05-10 | Stop reason: SDUPTHER

## 2019-03-15 RX ORDER — SOTALOL HYDROCHLORIDE 80 MG/1
80 TABLET ORAL 2 TIMES DAILY
COMMUNITY
End: 2019-10-07 | Stop reason: SDUPTHER

## 2019-03-17 ASSESSMENT — ENCOUNTER SYMPTOMS
VOMITING: 0
SORE THROAT: 0
DIARRHEA: 0
EYE REDNESS: 0
TROUBLE SWALLOWING: 0
ABDOMINAL PAIN: 0
CONSTIPATION: 0
SHORTNESS OF BREATH: 1
SINUS PRESSURE: 0
NAUSEA: 0
RHINORRHEA: 0
EYE DISCHARGE: 0
COUGH: 0
WHEEZING: 0

## 2019-03-18 ENCOUNTER — CARE COORDINATION (OUTPATIENT)
Dept: CASE MANAGEMENT | Age: 79
End: 2019-03-18

## 2019-03-18 RX ORDER — FUROSEMIDE 40 MG/1
40 TABLET ORAL DAILY
Qty: 90 TABLET | Refills: 1 | Status: SHIPPED | OUTPATIENT
Start: 2019-03-18 | End: 2019-10-09 | Stop reason: SDUPTHER

## 2019-03-20 ENCOUNTER — CARE COORDINATION (OUTPATIENT)
Dept: CARE COORDINATION | Age: 79
End: 2019-03-20

## 2019-03-22 ENCOUNTER — TELEPHONE (OUTPATIENT)
Dept: FAMILY MEDICINE CLINIC | Age: 79
End: 2019-03-22

## 2019-03-28 ENCOUNTER — TELEPHONE (OUTPATIENT)
Dept: FAMILY MEDICINE CLINIC | Age: 79
End: 2019-03-28

## 2019-04-08 ENCOUNTER — TELEPHONE (OUTPATIENT)
Dept: FAMILY MEDICINE CLINIC | Age: 79
End: 2019-04-08

## 2019-04-08 DIAGNOSIS — I50.9 CONGESTIVE HEART FAILURE, UNSPECIFIED HF CHRONICITY, UNSPECIFIED HEART FAILURE TYPE (HCC): Primary | ICD-10-CM

## 2019-04-08 NOTE — TELEPHONE ENCOUNTER
Spoke with daughter and he has no follow up with cardiology. Debbrah Quarto Dr Jerilee Apgar would like her to call tomorrow to schedule him an appt regarding his CHF. Gave her the phone number to Dr Daiana Kebede office his cardiologist. Senait Fire to back off the water pill when she notices the weight start to come off and have him get a BMP checked in 1 wk to monitor kidney and potassium.

## 2019-04-15 ENCOUNTER — HOSPITAL ENCOUNTER (OUTPATIENT)
Dept: LAB | Age: 79
Discharge: HOME | End: 2019-04-15
Payer: MEDICARE

## 2019-04-15 DIAGNOSIS — I50.9 CONGESTIVE HEART FAILURE, UNSPECIFIED HF CHRONICITY, UNSPECIFIED HEART FAILURE TYPE (HCC): ICD-10-CM

## 2019-04-15 LAB
ANION GAP SERPL CALCULATED.3IONS-SCNC: 10 MMOL/L (ref 9–17)
BUN BLDV-MCNC: 25 MG/DL (ref 8–23)
BUN/CREAT BLD: 17 (ref 9–20)
CALCIUM SERPL-MCNC: 7.9 MG/DL (ref 8.6–10.4)
CHLORIDE BLD-SCNC: 107 MMOL/L (ref 98–107)
CO2: 25 MMOL/L (ref 20–31)
CREAT SERPL-MCNC: 1.47 MG/DL (ref 0.7–1.2)
GFR AFRICAN AMERICAN: 56 ML/MIN
GFR NON-AFRICAN AMERICAN: 46 ML/MIN
GFR SERPL CREATININE-BSD FRML MDRD: ABNORMAL ML/MIN/{1.73_M2}
GFR SERPL CREATININE-BSD FRML MDRD: ABNORMAL ML/MIN/{1.73_M2}
GLUCOSE BLD-MCNC: 158 MG/DL (ref 70–99)
POTASSIUM SERPL-SCNC: 4.3 MMOL/L (ref 3.7–5.3)
SODIUM BLD-SCNC: 142 MMOL/L (ref 135–144)

## 2019-04-15 PROCEDURE — 36415 COLL VENOUS BLD VENIPUNCTURE: CPT

## 2019-04-15 PROCEDURE — 80048 BASIC METABOLIC PNL TOTAL CA: CPT

## 2019-05-09 DIAGNOSIS — M15.9 PRIMARY OSTEOARTHRITIS INVOLVING MULTIPLE JOINTS: ICD-10-CM

## 2019-05-09 DIAGNOSIS — G62.9 PERIPHERAL POLYNEUROPATHY: ICD-10-CM

## 2019-05-09 NOTE — TELEPHONE ENCOUNTER
Spoke with patient. He states he is taking two (2) Norco tabs every morning about 5:30am. His bilateral legs are what hurt-the entire leg not just the joints. Questioned him about leg edema and he stated they aren't nearly as swollen and doesn't feel that is why they hurt. States very occasionally he will take a norco in the evening also. Stated over the winter he was only taking one (1) tab a day but he is getting out and doing things now so is having more pain. States his weight is stable staying 268-270 lbs. Patient aware request would not be answered until tomorrow.

## 2019-05-09 NOTE — TELEPHONE ENCOUNTER
Check with Daughter Sarita Johnson regarding this refill. Typically 90 norco had been lasting him 90 days or more. He filled this last on 3/18/19 so hasn't even been 2 months. Is he having significant pain requiring more frequent use? What kind of pain is he having? Plus this is loaded for [facility], but I do not fill scripts for controlled substances to mail order. Just want them to be aware it would have to go to a local pharmacy. Believe he got it filled at [facility] last time.

## 2019-05-09 NOTE — TELEPHONE ENCOUNTER
Last Appt:  3/15/2019  Next Appt:   6/20/2019  Med verified in Epic    Last refill 03/15/19 90 tabs every 8 hrs

## 2019-05-10 RX ORDER — HYDROCODONE BITARTRATE AND ACETAMINOPHEN 5; 325 MG/1; MG/1
1 TABLET ORAL EVERY 8 HOURS PRN
Qty: 90 TABLET | Refills: 0 | Status: SHIPPED | OUTPATIENT
Start: 2019-05-10 | End: 2019-06-20 | Stop reason: SDUPTHER

## 2019-05-23 DIAGNOSIS — G62.9 PERIPHERAL POLYNEUROPATHY: ICD-10-CM

## 2019-05-24 RX ORDER — GABAPENTIN 600 MG/1
600 TABLET ORAL 2 TIMES DAILY
Qty: 180 TABLET | Refills: 0 | Status: SHIPPED | OUTPATIENT
Start: 2019-05-24 | End: 2019-08-13 | Stop reason: SDUPTHER

## 2019-05-24 NOTE — TELEPHONE ENCOUNTER
Sue Macdonald called requesting a refill of the below medication which has been pended for you: last filled script 2/21/2019 #180    Requested Prescriptions     Pending Prescriptions Disp Refills    gabapentin (NEURONTIN) 600 MG tablet 180 tablet 0     Sig: Take 1 tablet by mouth 2 times daily for 90 days.        Last Appointment Date: 3/15/2019  Next Appointment Date: 6/20/2019    No Known Allergies

## 2019-06-18 LAB
A/G RATIO: 1.4 RATIO
AGE FOR GFR: 80
ALBUMIN: 3.3 G/DL (ref 3.5–5)
ALK PHOSPHATASE: 130 UNITS/L (ref 38–126)
ALT SERPL-CCNC: 28 UNITS/L (ref 21–72)
ANION GAP SERPL CALCULATED.3IONS-SCNC: 10 MMOL/L
AST SERPL-CCNC: 22 UNITS/L (ref 17–59)
BASOPHILS # BLD: 0.09 THOU/MM3 (ref 0–0.3)
BILIRUB SERPL-MCNC: 0.4 MG/DL (ref 0.2–1.3)
BUN BLDV-MCNC: 18 MG/DL (ref 9–20)
CALCIUM SERPL-MCNC: 7.6 MG/DL (ref 8.4–10.2)
CHLORIDE BLD-SCNC: 107 MMOL/L (ref 98–120)
CHOLESTEROL/HDL RATIO: 3.4 RATIO (ref 0–4.5)
CHOLESTEROL: 74 MG/DL (ref 50–200)
CO2: 27 MMOL/L (ref 22–31)
CREAT SERPL-MCNC: 1.3 MG/DL (ref 0.7–1.3)
CREATININE, RANDOM: 180.1 MG/DL (ref 20–370)
DIFFERENTIAL: AUTOMATED DIFF
EGFR BF: 48 ML/MIN/1.73 M2
EGFR BM: 64 ML/MIN/1.73 M2
EGFR WF: 39 ML/MIN/1.73 M2
EGFR WM: 53 ML/MIN/1.73 M2
EOSINOPHIL # BLD: 0.57 THOU/MM3 (ref 0–1.1)
GLOBULIN: 2.4 G/DL
GLUCOSE: 127 MG/DL (ref 75–110)
HBA1C MFR BLD: 6.7 % (ref 4.4–6.4)
HCT VFR BLD CALC: 34.7 % (ref 42–52)
HDL, DIRECT: 22 MG/DL (ref 36–68)
HEMOGLOBIN: 11 G/DL (ref 13.8–17)
LDL CHOLESTEROL CALCULATED: 42.8 MG/DL (ref 0–160)
LYMPHOCYTES # BLD: 0.72 THOU/MM3 (ref 1–5.5)
MCH RBC QN AUTO: 28.3 PG (ref 28.5–32)
MCHC RBC AUTO-ENTMCNC: 31.8 G/DL (ref 32–37)
MCV RBC AUTO: 89 FL (ref 80–94)
MICROALBUMIN UR-MCNC: 15.2 MG/DL (ref 0–1.7)
MICROALBUMIN/CREAT UR-RTO: 84.4 MCG/MG CR
MONOCYTES # BLD: 0.36 THOU/MM3 (ref 0.1–1)
NEUTROPHILS: 6.19 THOU/MM3 (ref 2–8.1)
PDW BLD-RTO: 13.6 % (ref 10–15)
PLATELET # BLD: 183 THOU/MM3 (ref 130–400)
PMV BLD AUTO: 6 FL (ref 7.4–11)
POTASSIUM SERPL-SCNC: 4.2 MMOL/L (ref 3.6–5)
RBC # BLD: 3.89 M/UL (ref 4.7–6.1)
SCREENING PSA: 4.97 NG/ML (ref 0–4)
SODIUM BLD-SCNC: 140 MMOL/L (ref 135–145)
TOTAL PROTEIN: 5.7 G/DL (ref 6.3–8.2)
TRIGL SERPL-MCNC: 46 MG/DL (ref 10–250)
VLDLC SERPL CALC-MCNC: 9 MG/DL (ref 0–40)
WBC # BLD: 7.93 THOU/ML3 (ref 4.8–10)

## 2019-06-20 ENCOUNTER — OFFICE VISIT (OUTPATIENT)
Dept: FAMILY MEDICINE CLINIC | Age: 79
End: 2019-06-20
Payer: MEDICARE

## 2019-06-20 VITALS
BODY MASS INDEX: 37.19 KG/M2 | DIASTOLIC BLOOD PRESSURE: 70 MMHG | RESPIRATION RATE: 16 BRPM | OXYGEN SATURATION: 93 % | HEART RATE: 80 BPM | HEIGHT: 72 IN | SYSTOLIC BLOOD PRESSURE: 110 MMHG | WEIGHT: 274.6 LBS

## 2019-06-20 DIAGNOSIS — E78.5 DYSLIPIDEMIA: ICD-10-CM

## 2019-06-20 DIAGNOSIS — N18.30 TYPE 2 DIABETES MELLITUS WITH STAGE 3 CHRONIC KIDNEY DISEASE, WITHOUT LONG-TERM CURRENT USE OF INSULIN (HCC): Primary | ICD-10-CM

## 2019-06-20 DIAGNOSIS — I10 ESSENTIAL HYPERTENSION: ICD-10-CM

## 2019-06-20 DIAGNOSIS — M15.9 PRIMARY OSTEOARTHRITIS INVOLVING MULTIPLE JOINTS: ICD-10-CM

## 2019-06-20 DIAGNOSIS — G62.9 PERIPHERAL POLYNEUROPATHY: ICD-10-CM

## 2019-06-20 DIAGNOSIS — R97.20 ELEVATED PSA: ICD-10-CM

## 2019-06-20 DIAGNOSIS — E11.22 TYPE 2 DIABETES MELLITUS WITH STAGE 3 CHRONIC KIDNEY DISEASE, WITHOUT LONG-TERM CURRENT USE OF INSULIN (HCC): Primary | ICD-10-CM

## 2019-06-20 DIAGNOSIS — F32.1 MAJOR DEPRESSIVE DISORDER, SINGLE EPISODE, MODERATE (HCC): ICD-10-CM

## 2019-06-20 DIAGNOSIS — N18.30 CHRONIC KIDNEY DISEASE (CKD), STAGE III (MODERATE) (HCC): ICD-10-CM

## 2019-06-20 DIAGNOSIS — K21.9 GASTROESOPHAGEAL REFLUX DISEASE WITHOUT ESOPHAGITIS: ICD-10-CM

## 2019-06-20 PROCEDURE — G8417 CALC BMI ABV UP PARAM F/U: HCPCS | Performed by: FAMILY MEDICINE

## 2019-06-20 PROCEDURE — G8427 DOCREV CUR MEDS BY ELIG CLIN: HCPCS | Performed by: FAMILY MEDICINE

## 2019-06-20 PROCEDURE — 4040F PNEUMOC VAC/ADMIN/RCVD: CPT | Performed by: FAMILY MEDICINE

## 2019-06-20 PROCEDURE — G8598 ASA/ANTIPLAT THER USED: HCPCS | Performed by: FAMILY MEDICINE

## 2019-06-20 PROCEDURE — 1123F ACP DISCUSS/DSCN MKR DOCD: CPT | Performed by: FAMILY MEDICINE

## 2019-06-20 PROCEDURE — 99214 OFFICE O/P EST MOD 30 MIN: CPT | Performed by: FAMILY MEDICINE

## 2019-06-20 PROCEDURE — 1036F TOBACCO NON-USER: CPT | Performed by: FAMILY MEDICINE

## 2019-06-20 RX ORDER — PANTOPRAZOLE SODIUM 40 MG/1
TABLET, DELAYED RELEASE ORAL
Qty: 180 TABLET | Refills: 1 | Status: SHIPPED | OUTPATIENT
Start: 2019-06-20 | End: 2019-01-01 | Stop reason: DRUGHIGH

## 2019-06-20 RX ORDER — DESLORATADINE 5 MG/1
TABLET ORAL
Qty: 90 TABLET | Refills: 1 | Status: SHIPPED | OUTPATIENT
Start: 2019-06-20 | End: 2019-09-18 | Stop reason: SDUPTHER

## 2019-06-20 RX ORDER — HYDROCODONE BITARTRATE AND ACETAMINOPHEN 5; 325 MG/1; MG/1
2 TABLET ORAL EVERY MORNING
Qty: 180 TABLET | Refills: 0 | Status: SHIPPED | OUTPATIENT
Start: 2019-06-20 | End: 2019-09-10 | Stop reason: SDUPTHER

## 2019-06-20 RX ORDER — PAROXETINE HYDROCHLORIDE 40 MG/1
TABLET, FILM COATED ORAL
Qty: 90 TABLET | Refills: 1 | Status: SHIPPED | OUTPATIENT
Start: 2019-06-20 | End: 2019-01-01 | Stop reason: SDUPTHER

## 2019-06-20 ASSESSMENT — ENCOUNTER SYMPTOMS
RHINORRHEA: 0
VOMITING: 0
DIARRHEA: 0
TROUBLE SWALLOWING: 0
WHEEZING: 0
ABDOMINAL PAIN: 0
EYE DISCHARGE: 0
SORE THROAT: 0
CONSTIPATION: 0
SINUS PRESSURE: 0
NAUSEA: 0
EYE REDNESS: 0
SHORTNESS OF BREATH: 0
COUGH: 0

## 2019-06-20 ASSESSMENT — PATIENT HEALTH QUESTIONNAIRE - PHQ9
1. LITTLE INTEREST OR PLEASURE IN DOING THINGS: 0
2. FEELING DOWN, DEPRESSED OR HOPELESS: 0
SUM OF ALL RESPONSES TO PHQ9 QUESTIONS 1 & 2: 0
SUM OF ALL RESPONSES TO PHQ QUESTIONS 1-9: 0
SUM OF ALL RESPONSES TO PHQ QUESTIONS 1-9: 0

## 2019-06-20 NOTE — PATIENT INSTRUCTIONS
Orders Only on 06/18/2019   Component Date Value Ref Range Status    Hemoglobin A1C 06/18/2019 6.7* 4.4 - 6.4 % Final    Glucose 06/18/2019 127* 75 - 110 mg/dL Final    BUN 06/18/2019 18  9 - 20 mg/dL Final    CREATININE 06/18/2019 1.3* 0.7 - 1.3 mg/dL Final    Sodium 06/18/2019 140  135 - 145 mmol/L Final    Potassium 06/18/2019 4.2  3.6 - 5.0 mmol/L Final    Chloride 06/18/2019 107  98 - 120 mmol/L Final    CO2 06/18/2019 27  22 - 31 mmol/L Final    Anion Gap 06/18/2019 10  mmol/L Final    Calcium 06/18/2019 7.6* 8.4 - 10.2 mg/dL Final    Total Protein 06/18/2019 5.7* 6.3 - 8.2 g/dL Final    Albumin 06/18/2019 3.3* 3.5 - 5.0 g/dL Final    Globulin 06/18/2019 2.4  g/dL Final    Albumin/Globulin Ratio 06/18/2019 1.4  ratio Final    Total Bilirubin 06/18/2019 0.4  0.2 - 1.3 mg/dL Final    AST 06/18/2019 22  17 - 59 units/L Final    ALT 06/18/2019 28  21 - 72 units/L Final    Alk Phosphatase 06/18/2019 130* 38 - 126 units/L Final    Age 06/18/2019 79   Final    eGFR BF 06/18/2019 48  mL/min/1.73 m2 Final    eGFR WF 06/18/2019 39  mL/min/1.73 m2 Final    eGFR BM 06/18/2019 64  mL/min/1.73 m2 Final    eGFR WM 06/18/2019 53  mL/min/1.73 m2 Final    Triglycerides 06/18/2019 46  10 - 250 mg/dL Final    Cholesterol 06/18/2019 74  50 - 200 mg/dL Final    HDL, DIRECT 06/18/2019 22* 36 - 68 mg/dL Final    VLDL 06/18/2019 9  0 - 40 mg/dL Final    LDL Calculated 06/18/2019 42.8  0.0 - 160 mg/dL Final    Chol/HDL Ratio 06/18/2019 3.4  0.0 - 4.5 ratio Final    WBC 06/18/2019 7.93  4.80 - 10 thou/ml3 Final    RBC 06/18/2019 3.89* 4.70 - 6.1 M/uL Final    Hemoglobin 06/18/2019 11.0* 13.8 - 17 g/dL Final    Hematocrit 06/18/2019 34.7* 42.0 - 52 % Final    MCV 06/18/2019 89.0  80.0 - 94 fl Final    MCH 06/18/2019 28.3* 28.5 - 32 pg Final    MCHC 06/18/2019 31.8* 32.0 - 37 g/dL Final    RDW 06/18/2019 13.6  10.0 - 15 % Final    Platelets 59/17/0387 183  130 - 400 thou/mm3 Final    MPV 06/18/2019 6. 0* 7.4 - 11.0 fl Final    Differential 06/18/2019 AUTOMATED DIFF   Final    Neutrophils 06/18/2019 6.19  2.00 - 8.1 thou/mm3 Final    Lymphocytes 06/18/2019 0.72* 1.00 - 5.5 thou/mm3 Final    Monocytes 06/18/2019 0.36  0.10 - 1.0 thou/mm3 Final    Eosinophils 06/18/2019 0.57  0.00 - 1.1 thou/mm3 Final    Basophils 06/18/2019 0.09  0.00 - 0.3 thou/mm3 Final    Microalbumin, Random Urine 06/18/2019 15.2* 0.0 - 1.7 mg/dL Final    Creatinine, Random 06/18/2019 180.1  20.0 - 370 mg/dL Final    Microalbumin Creatinine Ratio 06/18/2019 84.4  mcg/mg Cr Final    Comment: The ADA (Diabetes Care 26: S94-S98, 2003) defines abnormalities in albumin excretion as   follows:    Category                 Result                      (mcg/mg creatinine)  Normal                     <30  Microalbuminuria           Clinical albuminuria    >or= 300    The ADA recommends that at least two of three specimens collected within a 3-6 month period   be abnormal before considering a patient to be within a diagnostic category.  Screening PSA 06/18/2019 4.97* 0.00 - 4.0 ng/mL Final    High doses of Biotin may falsely decrease PSA results. Patient Education        Diabetes and Preventing Falls: Care Instructions  Your Care Instructions    If you are an older adult who has diabetes, you may have a higher risk of falling. Complications of diabetes--such as nerve damage, foot problems, and reduced vision--may increase your risk of a fall. Some of your medicines also may add to your risk. By making your home safer, you can lower your risk of falling. Doing things to prevent diabetes complications may also help to lower your risk. You can make your home safer with a few simple measures. Follow-up care is a key part of your treatment and safety. Be sure to make and go to all appointments, and call your doctor if you are having problems.  It's also a good idea to know your test results and keep a list of the medicines you take.  How can you care for yourself at home? Taking care of yourself  · Keep your blood sugar at a target level (which you set with your doctor). · Exercise regularly to improve your strength, muscle tone, and balance. Walk if you can. Swimming may be a good choice if you cannot walk easily. · Have your vision checked as often as your doctor recommends. It is usually once a year or more often if you have eye problems. · Know the side effects of the medicines you take. Ask your doctor or pharmacist whether the medicines you take can affect your balance. Sleeping pills or sedatives can affect your balance. · Limit the amount of alcohol you drink. Alcohol can impair your balance and other senses. · Have your doctor check your feet during each visit. If you have a foot problem, see your doctor. Preventing falls at home  · Remove raised doorway thresholds, throw rugs, and clutter. Repair loose carpet or raised areas in the floor. · Move furniture and electrical cords to keep them out of walking paths. · Use nonskid floor wax, and wipe up spills right away, especially on ceramic tile floors. · If you use a walker or cane, put rubber tips on it. If you use crutches, clean the bottoms of them regularly with an abrasive pad, such as steel wool. · Keep your house well lit, especially Yovana Brooms, and outside walkways. Use night-lights in areas such as hallways and bathrooms. Add extra light switches or use remote switches (such as switches that go on or off when you clap your hands) to make it easier to turn lights on if you have to get up during the night. · Install sturdy handrails on stairways. Put grab bars near your shower, bathtub, and toilet. · Store household items on low shelves so that you do not have to climb or reach high. Or use a reaching device that you can get at a medical supply store.  If you have to climb for something, use a step stool with handrails, or ask someone to get it for you.  · Keep a cordless phone and a flashlight with new batteries by your bed. If possible, put a phone in each of the main rooms of your house, or carry a cell phone in case you fall and cannot reach a phone. Or you can wear a device around your neck or wrist. You push a button that sends a signal for help. · Wear low-heeled shoes that fit well and give your feet good support. Use footwear with nonskid soles. Check the heels and soles of your shoes for wear. Repair or replace worn heels or soles. · Do not wear socks without shoes on wood floors. · Walk on the grass when the sidewalks are slippery. If you live in an area that gets snow and ice in the winter, sprinkle salt on slippery steps and sidewalks. Where can you learn more? Go to https://[email or web address] org and sign in to your [employer] account. Enter G793 in the [employer] box to learn more about \"Diabetes and Preventing Falls: Care Instructions. \"     If you do not have an account, please click on the \"Sign Up Now\" link. Current as of: November 7, 2018  Content Version: 12.0  © 5789-2728 [employer]wise, [employer]. Care instructions adapted under license by [facility] ([facility]). If you have questions about a medical condition or this instruction, always ask your healthcare professional. [place] [address] any warranty or liability for your use of this information.

## 2019-06-20 NOTE — PROGRESS NOTES
known history of anxiety and depression in which is stable and controlled with continued use of Paxil. Is stable with this medication. Not having any significant issues with severe anxiety or depression. Patient has a known history of GERD which is stable and controlled with continued use of Protonix. Patient otherwise has no other acute medical concerns. .  Patient's recent lab reports are as follows:    Results for orders placed or performed in visit on 06/18/19   Hemoglobin A1C   Result Value Ref Range    Hemoglobin A1C 6.7 (H) 4.4 - 6.4 %   Comprehensive Metabolic Panel   Result Value Ref Range    Glucose 127 (H) 75 - 110 mg/dL    BUN 18 9 - 20 mg/dL    CREATININE 1.3 (H) 0.7 - 1.3 mg/dL    Sodium 140 135 - 145 mmol/L    Potassium 4.2 3.6 - 5.0 mmol/L    Chloride 107 98 - 120 mmol/L    CO2 27 22 - 31 mmol/L    Anion Gap 10 mmol/L    Calcium 7.6 (L) 8.4 - 10.2 mg/dL    Total Protein 5.7 (L) 6.3 - 8.2 g/dL    Albumin 3.3 (L) 3.5 - 5.0 g/dL    Globulin 2.4 g/dL    Albumin/Globulin Ratio 1.4 ratio    Total Bilirubin 0.4 0.2 - 1.3 mg/dL    AST 22 17 - 59 units/L    ALT 28 21 - 72 units/L    Alk Phosphatase 130 (H) 38 - 126 units/L    Age 79     eGFR BF 48 mL/min/1.73 m2    eGFR WF 39 mL/min/1.73 m2    eGFR BM 64 mL/min/1.73 m2    eGFR WM 53 mL/min/1.73 m2   Lipid Panel   Result Value Ref Range    Triglycerides 46 10 - 250 mg/dL    Cholesterol 74 50 - 200 mg/dL    HDL, DIRECT 22 (L) 36 - 68 mg/dL    VLDL 9 0 - 40 mg/dL    LDL Calculated 42.8 0.0 - 160 mg/dL    Chol/HDL Ratio 3.4 0.0 - 4.5 ratio   CBC Auto Differential   Result Value Ref Range    WBC 7.93 4.80 - 10 thou/ml3    RBC 3.89 (L) 4.70 - 6.1 M/uL    Hemoglobin 11.0 (L) 13.8 - 17 g/dL    Hematocrit 34.7 (L) 42.0 - 52 %    MCV 89.0 80.0 - 94 fl    MCH 28.3 (L) 28.5 - 32 pg    MCHC 31.8 (L) 32.0 - 37 g/dL    RDW 13.6 10.0 - 15 %    Platelets 350 607 - 529 thou/mm3    MPV 6.0 (L) 7.4 - 11.0 fl    Differential AUTOMATED DIFF     Neutrophils 6.19 2.00 - 8.1 thou/mm3    Lymphocytes 0.72 (L) 1.00 - 5.5 thou/mm3    Monocytes 0.36 0.10 - 1.0 thou/mm3    Eosinophils 0.57 0.00 - 1.1 thou/mm3    Basophils 0.09 0.00 - 0.3 thou/mm3   Microalbumin, Ur   Result Value Ref Range    Microalbumin, Random Urine 15.2 (H) 0.0 - 1.7 mg/dL    Creatinine, Random 180.1 20.0 - 370 mg/dL    Microalbumin Creatinine Ratio 84.4 mcg/mg Cr   Psa screening   Result Value Ref Range    Screening PSA 4.97 (H) 0.00 - 4.0 ng/mL     Did discuss dietary modification and increased exercise to keep cholesterol and blood sugar under good control. Other review of systems are as noted below. Did review patient's med list, allergies, social history, fam history, pmhx and pshx today as noted in the record. Preventative measures are reviewed today. See health maintenance section for complete preventative plan of care. Review of Systems   Constitutional: Negative for chills, fatigue and fever. HENT: Negative for congestion, ear pain, postnasal drip, rhinorrhea, sinus pressure, sore throat and trouble swallowing. Eyes: Negative for discharge and redness. Respiratory: Negative for cough, shortness of breath and wheezing. Cardiovascular: Negative for chest pain. Gastrointestinal: Negative for abdominal pain, constipation, diarrhea, nausea and vomiting. Musculoskeletal: Positive for gait problem and myalgias (ongoing daily lower leg pain). Negative for arthralgias and neck pain. Skin: Negative for rash and wound. Allergic/Immunologic: Negative for environmental allergies. Neurological: Negative for dizziness, weakness, light-headedness and headaches. Hematological: Negative for adenopathy. Psychiatric/Behavioral: Negative. Prior to Visit Medications    Medication Sig Taking?  Authorizing Provider   PARoxetine (PAXIL) 40 MG tablet TAKE 1 TABLET DAILY Yes Jennifer Glass,    pantoprazole (PROTONIX) 40 MG tablet TAKE 1 TABLET TWICE A DAY Yes Awa Carmichael, DO desloratadine (CLARINEX) 5 MG tablet TAKE 1 TABLET DAILY Yes George Farrar, DO   HYDROcodone-acetaminophen (NORCO) 5-325 MG per tablet Take 2 tablets by mouth every morning for 90 days. Yes George Farrar, DO   gabapentin (NEURONTIN) 600 MG tablet Take 1 tablet by mouth 2 times daily for 90 days. Yes George Farrar, DO   furosemide (LASIX) 40 MG tablet Take 1 tablet by mouth daily Yes Jennifer Glass,    sotalol (BETAPACE) 80 MG tablet Take 80 mg by mouth 2 times daily Yes Historical Provider, MD   Cyanocobalamin (VITAMIN B-12) 5000 MCG TBDP Take by mouth daily Yes Historical Provider, MD   Multiple Vitamins-Minerals (MULTIVITAMIN PO) Take 1 tablet by mouth daily. Yes Historical Provider, MD   calcium carbonate 600 MG TABS tablet Take 1 tablet by mouth daily. Yes Historical Provider, MD   aspirin 81 MG tablet Take 81 mg by mouth daily. Yes Historical Provider, MD        Social History     Tobacco Use    Smoking status: Former Smoker     Packs/day: 1.00     Years: 20.00     Pack years: 20.00     Types: Cigarettes     Last attempt to quit: 1979     Years since quittin.1    Smokeless tobacco: Never Used    Tobacco commentMulexus Velez RRT 16   Substance Use Topics    Alcohol use: No     Alcohol/week: 0.0 oz        Vitals:    19 0916   BP: 110/70   Site: Left Upper Arm   Position: Sitting   Cuff Size: Large Adult   Pulse: 80   Resp: 16   SpO2: 93%   Weight: 274 lb 9.6 oz (124.6 kg)  Comment: 267 at home   Height: 6' (1.829 m)     Estimated body mass index is 37.24 kg/m² as calculated from the following:    Height as of this encounter: 6' (1.829 m). Weight as of this encounter: 274 lb 9.6 oz (124.6 kg). Physical Exam   Constitutional: He is oriented to person, place, and time. He appears well-developed and well-nourished. No distress. HENT:   Head: Normocephalic and atraumatic.    Right Ear: External ear normal.   Left Ear: External ear normal.   Nose: Nose normal. Mouth/Throat: Oropharynx is clear and moist. No oropharyngeal exudate. Eyes: Pupils are equal, round, and reactive to light. Conjunctivae and EOM are normal. Right eye exhibits no discharge. Left eye exhibits no discharge. Neck: Normal range of motion. Neck supple. No thyromegaly present. Cardiovascular: Normal rate and regular rhythm. Murmur heard. Pulmonary/Chest: Effort normal and breath sounds normal. He has no wheezes. He has no rales. Abdominal: Soft. Bowel sounds are normal.   Musculoskeletal: He exhibits no edema or tenderness. Lymphadenopathy:     He has no cervical adenopathy. Neurological: He is alert and oriented to person, place, and time. Skin: Skin is warm and dry. No rash noted. He is not diaphoretic. Psychiatric: He has a normal mood and affect. His behavior is normal. Judgment and thought content normal.   Nursing note and vitals reviewed. ASSESSMENT/PLAN:  1. Type 2 diabetes mellitus with stage 3 chronic kidney disease, without long-term current use of insulin (HCC)  Stable and controlled with current dietary intake. Continued low-carb/low sugar diet and routine exercise as able is advised to keep this controlled  - Hemoglobin A1C; Future    2. Essential hypertension  Table controlled on his current medical therapy. - CBC Auto Differential; Future  - Comprehensive Metabolic Panel; Future    3. Chronic kidney disease (CKD), stage III (moderate) (HCC)  Creatinine remained stable at this time. We will continue to monitor. 4. Major depressive disorder, single episode, moderate (HCC)  Stable on his current dose of Paxil. 5. Dyslipidemia  Stable with dietary efforts.  - Lipid Panel; Future    6. Elevated PSA  PSA remains elevated but when reviewed previous PSAs it actually has come down. Is asymptomatic at this time so we will just continue to monitor.  - PSA, Diagnostic; Future    7.  Gastroesophageal reflux disease without esophagitis  Stable controlled on his current dose of Protonix. - pantoprazole (PROTONIX) 40 MG tablet; TAKE 1 TABLET TWICE A DAY  Dispense: 180 tablet; Refill: 1    8. Peripheral polyneuropathy  Stable with continued use of gabapentin and 2 Norco daily. He is trying to use lowest effective dose in order to control his pain. Does use this medication appropriately.  - HYDROcodone-acetaminophen (NORCO) 5-325 MG per tablet; Take 2 tablets by mouth every morning for 90 days. Dispense: 180 tablet; Refill: 0    9. Primary osteoarthritis involving multiple joints  As noted above. Is not a candidate for anti-inflammatories due to chronic kidney disease.  - HYDROcodone-acetaminophen (NORCO) 5-325 MG per tablet; Take 2 tablets by mouth every morning for 90 days. Dispense: 180 tablet; Refill: 0    Orders Placed This Encounter   Medications    PARoxetine (PAXIL) 40 MG tablet     Sig: TAKE 1 TABLET DAILY     Dispense:  90 tablet     Refill:  1    pantoprazole (PROTONIX) 40 MG tablet     Sig: TAKE 1 TABLET TWICE A DAY     Dispense:  180 tablet     Refill:  1    desloratadine (CLARINEX) 5 MG tablet     Sig: TAKE 1 TABLET DAILY     Dispense:  90 tablet     Refill:  1    HYDROcodone-acetaminophen (NORCO) 5-325 MG per tablet     Sig: Take 2 tablets by mouth every morning for 90 days.      Dispense:  180 tablet     Refill:  0     Reduce doses taken as pain becomes manageable     Orders Placed This Encounter   Procedures    CBC Auto Differential     To be done in 6 months     Standing Status:   Future     Standing Expiration Date:   6/19/2020    Comprehensive Metabolic Panel     To be done in 6 months     Standing Status:   Future     Standing Expiration Date:   6/19/2020    Hemoglobin A1C     To be done in 6 months     Standing Status:   Future     Standing Expiration Date:   6/19/2020    Lipid Panel     To be done in 6 months     Standing Status:   Future     Standing Expiration Date:   6/19/2020     Order Specific Question:   Is Patient Fasting?/# of Hours Answer:   12 hours    PSA, Diagnostic     Standing Status:   Future     Standing Expiration Date:   6/20/2020     Controlled Substance Monitoring:    Acute and Chronic Pain Monitoring:   RX Monitoring 6/20/2019   Attestation -   Periodic Controlled Substance Monitoring Possible medication side effects, risk of tolerance/dependence & alternative treatments discussed. ;No signs of potential drug abuse or diversion identified. ;Assessed functional status. ;Obtaining appropriate analgesic effect of treatment. Chronic Pain > 50 MEDD Re-evaluated the status of the patient's underlying condition causing pain. Chronic Pain > 80 MEDD -   Chronic Pain > 120 MEDD Obtained or confirmed \"Medication Contract\" on file. Vision is to return to my office in 6 months duration or sooner if any further problems or symptoms arise. (Please note that portions of this note were completed with a voice recognition program. Efforts were made to edit the dictations but occasionally words are mis-transcribed.)      Return in about 6 months (around 12/20/2019). An electronic signature was used to authenticate this note.     --Blanquita Landeros,  on 6/20/2019 at 10:01 AM

## 2019-06-20 NOTE — PROGRESS NOTES
Sinan received counseling on the following healthy behaviors: nutrition and exercise  Reviewed prior labs and health maintenance  Continue current medications, diet and exercise. Discussed use, benefit, and side effects of prescribed medications. Barriers to medication compliance addressed. Patient given educational materials - see patient instructions  Was a self-tracking handout given in paper form or via [employer]? Yes    Requested Prescriptions     Pending Prescriptions Disp Refills    PARoxetine (PAXIL) 40 MG tablet 90 tablet 1     Sig: TAKE 1 TABLET DAILY    pantoprazole (PROTONIX) 40 MG tablet 180 tablet 1     Sig: TAKE 1 TABLET TWICE A DAY    desloratadine (CLARINEX) 5 MG tablet 90 tablet 1     Sig: TAKE 1 TABLET DAILY    HYDROcodone-acetaminophen (NORCO) 5-325 MG per tablet 90 tablet 0     Sig: Take 1 tablet by mouth every 8 hours as needed for Pain for up to 90 days. All patient questions answered. Patient voiced understanding. Quality Measures    Body mass index is 37.24 kg/m². Elevated. Weight control planned discussed Healthy diet and regular exercise. BP: 110/70. Blood pressure is normal. Treatment plan consists of No treatment change needed. Fall Risk 12/19/2018 12/6/2017 12/2/2016 6/2/2016 12/1/2015 6/20/2014   2 or more falls in past year? no no no no no no   Fall with injury in past year? no no yes no no no     The patient does not have a history of falls. I did , complete a risk assessment for falls.  A plan of care for falls No Treatment plan indicated    Lab Results   Component Value Date    LDLCALC 42.8 06/18/2019    LDLCHOLESTEROL 43 03/02/2019    (goal LDL reduction with dx if diabetes is 50% LDL reduction)    PHQ Scores 6/20/2019 12/19/2018 12/6/2017 6/6/2017 12/2/2016 6/2/2016 12/1/2015   PHQ2 Score 0 5 0 0 0 0 2   PHQ9 Score 0 8 0 0 0 0 2     Interpretation of Total Score Depression Severity: 1-4 = Minimal depression, 5-9 = Mild depression, 10-14 = Moderate

## 2019-07-02 ENCOUNTER — OFFICE VISIT (OUTPATIENT)
Dept: PRIMARY CARE CLINIC | Age: 79
End: 2019-07-02
Payer: MEDICARE

## 2019-07-02 VITALS
SYSTOLIC BLOOD PRESSURE: 132 MMHG | BODY MASS INDEX: 36.94 KG/M2 | HEART RATE: 74 BPM | OXYGEN SATURATION: 98 % | DIASTOLIC BLOOD PRESSURE: 74 MMHG | TEMPERATURE: 98 F | WEIGHT: 272.4 LBS

## 2019-07-02 DIAGNOSIS — S61.431A PUNCTURE WOUND OF RIGHT HAND WITHOUT FOREIGN BODY, INITIAL ENCOUNTER: ICD-10-CM

## 2019-07-02 DIAGNOSIS — L03.011 CELLULITIS OF RIGHT INDEX FINGER: Primary | ICD-10-CM

## 2019-07-02 PROCEDURE — G8427 DOCREV CUR MEDS BY ELIG CLIN: HCPCS | Performed by: FAMILY MEDICINE

## 2019-07-02 PROCEDURE — G8598 ASA/ANTIPLAT THER USED: HCPCS | Performed by: FAMILY MEDICINE

## 2019-07-02 PROCEDURE — 1123F ACP DISCUSS/DSCN MKR DOCD: CPT | Performed by: FAMILY MEDICINE

## 2019-07-02 PROCEDURE — 4040F PNEUMOC VAC/ADMIN/RCVD: CPT | Performed by: FAMILY MEDICINE

## 2019-07-02 PROCEDURE — G8417 CALC BMI ABV UP PARAM F/U: HCPCS | Performed by: FAMILY MEDICINE

## 2019-07-02 PROCEDURE — 1036F TOBACCO NON-USER: CPT | Performed by: FAMILY MEDICINE

## 2019-07-02 PROCEDURE — 99213 OFFICE O/P EST LOW 20 MIN: CPT | Performed by: FAMILY MEDICINE

## 2019-07-02 RX ORDER — AMOXICILLIN AND CLAVULANATE POTASSIUM 500; 125 MG/1; MG/1
1 TABLET, FILM COATED ORAL 2 TIMES DAILY
Qty: 20 TABLET | Refills: 0 | Status: SHIPPED | OUTPATIENT
Start: 2019-07-02 | End: 2019-07-12

## 2019-07-04 ASSESSMENT — ENCOUNTER SYMPTOMS
EYES NEGATIVE: 1
GASTROINTESTINAL NEGATIVE: 1
RESPIRATORY NEGATIVE: 1
COLOR CHANGE: 1

## 2019-07-04 NOTE — PROGRESS NOTES
2019     Dao Dumas (:  1940) is a [de-identified] y.o. male, here for evaluation of the following medical concerns:    Hand Pain    The incident occurred 12 to 24 hours ago (got fish hook stuck in right index finger yesterday. Today has had erythema and swelling to the digit with pain). Injury mechanism: fish hook in right index finger. The pain is present in the right fingers. The quality of the pain is described as aching. The pain is moderate. The pain has been constant since the incident. Pertinent negatives include no numbness or tingling. The symptoms are aggravated by movement and palpation. He has tried nothing for the symptoms. Did review patient's med list, allergies, social history,pmhx and pshx today as noted in the record. Review of Systems   Constitutional: Negative. HENT: Negative. Eyes: Negative. Respiratory: Negative. Cardiovascular: Negative. Gastrointestinal: Negative. Musculoskeletal: Positive for arthralgias and joint swelling. Skin: Positive for color change and wound. Neurological: Negative for tingling and numbness. Prior to Visit Medications    Medication Sig Taking? Authorizing Provider   amoxicillin-clavulanate (AUGMENTIN) 500-125 MG per tablet Take 1 tablet by mouth 2 times daily for 10 days Yes Hanna Casillas DO   PARoxetine (PAXIL) 40 MG tablet TAKE 1 TABLET DAILY Yes Jennifer Glass DO   pantoprazole (PROTONIX) 40 MG tablet TAKE 1 TABLET TWICE A DAY Yes Jennifer Glass DO   desloratadine (CLARINEX) 5 MG tablet TAKE 1 TABLET DAILY Yes Hanna Casillas DO   HYDROcodone-acetaminophen (NORCO) 5-325 MG per tablet Take 2 tablets by mouth every morning for 90 days. Yes Hanna Casillas DO   gabapentin (NEURONTIN) 600 MG tablet Take 1 tablet by mouth 2 times daily for 90 days.  Yes Hanna Casillas DO   furosemide (LASIX) 40 MG tablet Take 1 tablet by mouth daily Yes Hanna Casillas DO   sotalol (BETAPACE) 80 MG tablet Take 80

## 2019-08-13 DIAGNOSIS — G62.9 PERIPHERAL POLYNEUROPATHY: ICD-10-CM

## 2019-08-13 RX ORDER — GABAPENTIN 600 MG/1
600 TABLET ORAL 2 TIMES DAILY
Qty: 180 TABLET | Refills: 0 | Status: SHIPPED | OUTPATIENT
Start: 2019-08-21 | End: 2019-01-01 | Stop reason: SDUPTHER

## 2019-09-05 ENCOUNTER — TELEPHONE (OUTPATIENT)
Dept: FAMILY MEDICINE CLINIC | Age: 79
End: 2019-09-05

## 2019-09-05 RX ORDER — TIZANIDINE 2 MG/1
2 TABLET ORAL NIGHTLY
Qty: 90 TABLET | Refills: 0 | Status: SHIPPED | OUTPATIENT
Start: 2019-09-05 | End: 2019-01-01

## 2019-09-09 ENCOUNTER — TELEPHONE (OUTPATIENT)
Dept: FAMILY MEDICINE CLINIC | Age: 79
End: 2019-09-09

## 2019-09-09 RX ORDER — PRAMIPEXOLE DIHYDROCHLORIDE 0.25 MG/1
0.25 TABLET ORAL NIGHTLY
Qty: 30 TABLET | Refills: 0 | Status: SHIPPED | OUTPATIENT
Start: 2019-09-09 | End: 2019-01-01

## 2019-09-10 ENCOUNTER — OFFICE VISIT (OUTPATIENT)
Dept: PRIMARY CARE CLINIC | Age: 79
End: 2019-09-10
Payer: MEDICARE

## 2019-09-10 VITALS
HEART RATE: 88 BPM | SYSTOLIC BLOOD PRESSURE: 132 MMHG | HEIGHT: 72 IN | WEIGHT: 279 LBS | OXYGEN SATURATION: 99 % | BODY MASS INDEX: 37.79 KG/M2 | DIASTOLIC BLOOD PRESSURE: 74 MMHG | TEMPERATURE: 97.2 F

## 2019-09-10 DIAGNOSIS — G62.9 PERIPHERAL POLYNEUROPATHY: ICD-10-CM

## 2019-09-10 DIAGNOSIS — M15.9 PRIMARY OSTEOARTHRITIS INVOLVING MULTIPLE JOINTS: ICD-10-CM

## 2019-09-10 DIAGNOSIS — L30.9 ECZEMA, UNSPECIFIED TYPE: Primary | ICD-10-CM

## 2019-09-10 DIAGNOSIS — G25.81 RESTLESS LEGS SYNDROME: ICD-10-CM

## 2019-09-10 PROCEDURE — G8598 ASA/ANTIPLAT THER USED: HCPCS | Performed by: FAMILY MEDICINE

## 2019-09-10 PROCEDURE — G8427 DOCREV CUR MEDS BY ELIG CLIN: HCPCS | Performed by: FAMILY MEDICINE

## 2019-09-10 PROCEDURE — 99213 OFFICE O/P EST LOW 20 MIN: CPT | Performed by: FAMILY MEDICINE

## 2019-09-10 PROCEDURE — 1036F TOBACCO NON-USER: CPT | Performed by: FAMILY MEDICINE

## 2019-09-10 PROCEDURE — 1123F ACP DISCUSS/DSCN MKR DOCD: CPT | Performed by: FAMILY MEDICINE

## 2019-09-10 PROCEDURE — G8417 CALC BMI ABV UP PARAM F/U: HCPCS | Performed by: FAMILY MEDICINE

## 2019-09-10 PROCEDURE — 4040F PNEUMOC VAC/ADMIN/RCVD: CPT | Performed by: FAMILY MEDICINE

## 2019-09-10 RX ORDER — TRIAMCINOLONE ACETONIDE 1 MG/G
CREAM TOPICAL 2 TIMES DAILY
Qty: 80 G | Refills: 1 | Status: SHIPPED | OUTPATIENT
Start: 2019-09-10 | End: 2019-01-01

## 2019-09-10 RX ORDER — HYDROCODONE BITARTRATE AND ACETAMINOPHEN 5; 325 MG/1; MG/1
2 TABLET ORAL EVERY MORNING
Qty: 180 TABLET | Refills: 0 | Status: SHIPPED | OUTPATIENT
Start: 2019-09-10 | End: 2019-01-01

## 2019-09-10 ASSESSMENT — ENCOUNTER SYMPTOMS
DIARRHEA: 0
TROUBLE SWALLOWING: 0
SINUS PRESSURE: 0
EYE REDNESS: 0
EYE DISCHARGE: 0
SORE THROAT: 0
SHORTNESS OF BREATH: 0
WHEEZING: 0
ABDOMINAL PAIN: 0
CONSTIPATION: 0
NAUSEA: 0
COUGH: 0
VOMITING: 0
RHINORRHEA: 0

## 2019-09-10 NOTE — PROGRESS NOTES
9/10/2019     Tammy Tijerina (:  1940) is a [de-identified] y.o. male, here for evaluation of the following medical concerns:    Rash   This is a new problem. The current episode started more than 1 month ago (has had an ongoing rash to the right anterior anterior upper chest wall and neck for the last 6 months). The problem is unchanged. The affected locations include the chest and neck. The rash is characterized by redness and dryness. He was exposed to nothing. Pertinent negatives include no congestion, cough, diarrhea, fatigue, fever, rhinorrhea, shortness of breath, sore throat or vomiting. Past treatments include nothing. Patient also has had ongoing issues with pain in the lower extremities that has been chronic. States that he constantly has to move at night. Did try requip, but he didn't tolerate that well. Did prescribe Mirapex yesterday, but hasn't picked it up yet. Has had to use his norco on occasion in the afternoon. States that he has to take 2 norco daily to keep pain reasonably well controlled in the morning and afternoon. Needs refill of norco.    Did review patient's med list, allergies, social history,pmhx and pshx today as noted in the record. Review of Systems   Constitutional: Negative for chills, fatigue and fever. HENT: Negative for congestion, ear pain, postnasal drip, rhinorrhea, sinus pressure, sore throat and trouble swallowing. Eyes: Negative for discharge and redness. Respiratory: Negative for cough, shortness of breath and wheezing. Cardiovascular: Negative for chest pain. Gastrointestinal: Negative for abdominal pain, constipation, diarrhea, nausea and vomiting. Musculoskeletal: Positive for arthralgias and myalgias. Negative for neck pain. Skin: Positive for rash. Negative for wound. Allergic/Immunologic: Negative for environmental allergies. Neurological: Positive for numbness. Negative for dizziness, weakness, light-headedness and headaches.

## 2019-09-18 ENCOUNTER — TELEPHONE (OUTPATIENT)
Dept: FAMILY MEDICINE CLINIC | Age: 79
End: 2019-09-18

## 2019-09-18 RX ORDER — DESLORATADINE 5 MG/1
TABLET ORAL
Qty: 90 TABLET | Refills: 0 | Status: SHIPPED | OUTPATIENT
Start: 2019-09-18 | End: 2019-09-18 | Stop reason: CLARIF

## 2019-09-18 RX ORDER — LORATADINE 10 MG/1
10 TABLET ORAL DAILY
Qty: 90 TABLET | Refills: 1 | Status: SHIPPED | OUTPATIENT
Start: 2019-09-18 | End: 2020-01-01 | Stop reason: SDUPTHER

## 2019-09-30 ENCOUNTER — TELEPHONE (OUTPATIENT)
Dept: FAMILY MEDICINE CLINIC | Age: 79
End: 2019-09-30

## 2019-10-02 ENCOUNTER — TELEPHONE (OUTPATIENT)
Dept: FAMILY MEDICINE CLINIC | Age: 79
End: 2019-10-02

## 2019-10-02 ENCOUNTER — OFFICE VISIT (OUTPATIENT)
Dept: FAMILY MEDICINE CLINIC | Age: 79
End: 2019-10-02
Payer: MEDICARE

## 2019-10-02 VITALS
DIASTOLIC BLOOD PRESSURE: 80 MMHG | HEART RATE: 88 BPM | BODY MASS INDEX: 39.01 KG/M2 | OXYGEN SATURATION: 98 % | WEIGHT: 288 LBS | HEIGHT: 72 IN | RESPIRATION RATE: 20 BRPM | SYSTOLIC BLOOD PRESSURE: 120 MMHG

## 2019-10-02 DIAGNOSIS — T50.901A MEDICATION OVERDOSE, ACCIDENTAL OR UNINTENTIONAL, INITIAL ENCOUNTER: ICD-10-CM

## 2019-10-02 DIAGNOSIS — Z86.59 MENTAL STATUS CHANGE RESOLVED: ICD-10-CM

## 2019-10-02 DIAGNOSIS — R10.13 EPIGASTRIC PAIN: Primary | ICD-10-CM

## 2019-10-02 PROCEDURE — 1111F DSCHRG MED/CURRENT MED MERGE: CPT | Performed by: FAMILY MEDICINE

## 2019-10-02 PROCEDURE — 99214 OFFICE O/P EST MOD 30 MIN: CPT | Performed by: FAMILY MEDICINE

## 2019-10-02 ASSESSMENT — ENCOUNTER SYMPTOMS
DIARRHEA: 0
WHEEZING: 0
NAUSEA: 0
CONSTIPATION: 0
EYE REDNESS: 0
ABDOMINAL PAIN: 0
RHINORRHEA: 0
EYE DISCHARGE: 0
COUGH: 0
VOMITING: 0
TROUBLE SWALLOWING: 0
SHORTNESS OF BREATH: 0
SINUS PRESSURE: 0
SORE THROAT: 0

## 2019-10-03 ENCOUNTER — TELEPHONE (OUTPATIENT)
Dept: FAMILY MEDICINE CLINIC | Age: 79
End: 2019-10-03

## 2019-10-09 RX ORDER — SOTALOL HYDROCHLORIDE 80 MG/1
80 TABLET ORAL 2 TIMES DAILY
Qty: 180 TABLET | Refills: 1 | Status: ON HOLD | OUTPATIENT
Start: 2019-10-09 | End: 2020-01-01 | Stop reason: HOSPADM

## 2019-10-09 RX ORDER — FUROSEMIDE 40 MG/1
40 TABLET ORAL DAILY
Qty: 90 TABLET | Refills: 1 | Status: ON HOLD | OUTPATIENT
Start: 2019-10-09 | End: 2019-01-01 | Stop reason: SDUPTHER

## 2019-10-10 RX ORDER — BUPROPION HYDROCHLORIDE 150 MG/1
150 TABLET ORAL EVERY MORNING
Qty: 30 TABLET | Refills: 5 | Status: SHIPPED | OUTPATIENT
Start: 2019-10-10 | End: 2019-01-01

## 2019-10-10 RX ORDER — ONDANSETRON 4 MG/1
4 TABLET, FILM COATED ORAL 3 TIMES DAILY PRN
Qty: 30 TABLET | Refills: 0 | Status: SHIPPED | OUTPATIENT
Start: 2019-10-10

## 2019-12-06 PROBLEM — J18.9 PNEUMONIA: Status: ACTIVE | Noted: 2019-01-01

## 2019-12-16 NOTE — TELEPHONE ENCOUNTER
Luisito Form called requesting a refill of the below medication which has been pended for you: patient has refills of all of the below medications at the pharmacy.  Neurontin was filled for 90 day on 11/27/2019    Requested Prescriptions     Pending Prescriptions Disp Refills    lisinopril (PRINIVIL;ZESTRIL) 5 MG tablet 90 tablet 1     Sig: Take 1 tablet by mouth daily    loratadine (CLARITIN) 10 MG tablet 90 tablet 1     Sig: Take 1 tablet by mouth daily    gabapentin (NEURONTIN) 600 MG tablet 180 tablet 0       Last Appointment Date: 12/11/2019  Next Appointment Date: 6/11/2020    No Known Allergies

## 2019-12-16 NOTE — TELEPHONE ENCOUNTER
Daughter states she had talked with [facility] this weekend as pt was in the ER and it was discussed trying to get pt in sooner with a different surgeon but pt is doing ok at this time and wants to wait until 1-23 to see Dr Reed Rogers.

## 2019-12-19 NOTE — CARE COORDINATION
[facility] Transitions Follow Up Call- 3rd attempt     2019    Patient: Tom Morales  Patient : 1940   MRN: [id number]  Reason for Admission: CHF, pneumonia   Discharge Date: 19 RARS: Readmission Risk Score: 20         Attempted to reach patient for subsequent transitional call. VM left to return call to [phone]  3rd attempt.  Episode closed    Referral sent for ACM to follow patient     Follow Up  Future Appointments   Date Time Provider Leeanne Ferrara   2020  1:30 PM Savilla Peabody, APRN - CNP [facility]   2020 12:00 PM Juan Arreguin DO [facility]       Leo Gunter RN

## 2020-01-01 ENCOUNTER — CARE COORDINATION (OUTPATIENT)
Dept: CARE COORDINATION | Age: 80
End: 2020-01-01

## 2020-01-01 ENCOUNTER — TELEPHONE (OUTPATIENT)
Dept: PHARMACY | Facility: CLINIC | Age: 80
End: 2020-01-01

## 2020-01-01 ENCOUNTER — TELEPHONE (OUTPATIENT)
Dept: FAMILY MEDICINE CLINIC | Age: 80
End: 2020-01-01

## 2020-01-01 ENCOUNTER — HOSPITAL ENCOUNTER (INPATIENT)
Age: 80
LOS: 4 days | Discharge: HOSPICE | DRG: 291 | End: 2020-11-15
Attending: EMERGENCY MEDICINE | Admitting: INTERNAL MEDICINE
Payer: MEDICARE

## 2020-01-01 ENCOUNTER — APPOINTMENT (OUTPATIENT)
Dept: GENERAL RADIOLOGY | Age: 80
DRG: 292 | End: 2020-01-01
Payer: MEDICARE

## 2020-01-01 ENCOUNTER — HOSPITAL ENCOUNTER (INPATIENT)
Age: 80
LOS: 1 days | DRG: 302 | End: 2020-11-15
Attending: INTERNAL MEDICINE | Admitting: INTERNAL MEDICINE
Payer: COMMERCIAL

## 2020-01-01 ENCOUNTER — APPOINTMENT (OUTPATIENT)
Dept: ULTRASOUND IMAGING | Age: 80
DRG: 292 | End: 2020-01-01
Payer: MEDICARE

## 2020-01-01 ENCOUNTER — HOSPITAL ENCOUNTER (OUTPATIENT)
Dept: LAB | Age: 80
Discharge: HOME | End: 2020-06-09
Payer: MEDICARE

## 2020-01-01 ENCOUNTER — CARE COORDINATION (OUTPATIENT)
Dept: CASE MANAGEMENT | Age: 80
End: 2020-01-01

## 2020-01-01 ENCOUNTER — OFFICE VISIT (OUTPATIENT)
Dept: PAIN MANAGEMENT | Age: 80
End: 2020-01-01
Payer: MEDICARE

## 2020-01-01 ENCOUNTER — APPOINTMENT (OUTPATIENT)
Dept: ULTRASOUND IMAGING | Age: 80
DRG: 291 | End: 2020-01-01
Payer: MEDICARE

## 2020-01-01 ENCOUNTER — APPOINTMENT (OUTPATIENT)
Dept: CT IMAGING | Age: 80
DRG: 292 | End: 2020-01-01
Payer: MEDICARE

## 2020-01-01 ENCOUNTER — HOSPITAL ENCOUNTER (OUTPATIENT)
Age: 80
Setting detail: SPECIMEN
Discharge: HOME | End: 2020-07-21
Payer: MEDICARE

## 2020-01-01 ENCOUNTER — HOSPITAL ENCOUNTER (OUTPATIENT)
Dept: LAB | Age: 80
Discharge: HOME | End: 2020-06-11
Payer: MEDICARE

## 2020-01-01 ENCOUNTER — APPOINTMENT (OUTPATIENT)
Dept: GENERAL RADIOLOGY | Age: 80
DRG: 291 | End: 2020-01-01
Payer: MEDICARE

## 2020-01-01 ENCOUNTER — TELEPHONE (OUTPATIENT)
Dept: OTHER | Facility: CLINIC | Age: 80
End: 2020-01-01

## 2020-01-01 ENCOUNTER — HOSPITAL ENCOUNTER (OUTPATIENT)
Dept: LAB | Age: 80
Discharge: HOME | End: 2020-01-14
Payer: MEDICARE

## 2020-01-01 ENCOUNTER — HOSPITAL ENCOUNTER (INPATIENT)
Age: 80
LOS: 2 days | Discharge: HOME | DRG: 292 | End: 2020-10-21
Attending: EMERGENCY MEDICINE | Admitting: FAMILY MEDICINE
Payer: MEDICARE

## 2020-01-01 ENCOUNTER — OFFICE VISIT (OUTPATIENT)
Dept: FAMILY MEDICINE CLINIC | Age: 80
End: 2020-01-01
Payer: MEDICARE

## 2020-01-01 ENCOUNTER — APPOINTMENT (OUTPATIENT)
Dept: GENERAL RADIOLOGY | Age: 80
End: 2020-01-01
Payer: MEDICARE

## 2020-01-01 ENCOUNTER — HOSPITAL ENCOUNTER (EMERGENCY)
Age: 80
Discharge: HOME | End: 2020-11-02
Attending: EMERGENCY MEDICINE
Payer: MEDICARE

## 2020-01-01 ENCOUNTER — OFFICE VISIT (OUTPATIENT)
Dept: CARDIOLOGY | Age: 80
End: 2020-01-01
Payer: MEDICARE

## 2020-01-01 ENCOUNTER — OFFICE VISIT (OUTPATIENT)
Dept: PRIMARY CARE CLINIC | Age: 80
End: 2020-01-01
Payer: MEDICARE

## 2020-01-01 ENCOUNTER — HOSPITAL ENCOUNTER (INPATIENT)
Age: 80
LOS: 2 days | Discharge: HOME | DRG: 292 | End: 2020-01-07
Attending: SPECIALIST | Admitting: INTERNAL MEDICINE
Payer: MEDICARE

## 2020-01-01 ENCOUNTER — APPOINTMENT (OUTPATIENT)
Dept: CT IMAGING | Age: 80
End: 2020-01-01
Payer: MEDICARE

## 2020-01-01 ENCOUNTER — OFFICE VISIT (OUTPATIENT)
Dept: ORTHOPEDIC SURGERY | Age: 80
End: 2020-01-01
Payer: MEDICARE

## 2020-01-01 VITALS
DIASTOLIC BLOOD PRESSURE: 60 MMHG | HEIGHT: 72 IN | HEART RATE: 96 BPM | SYSTOLIC BLOOD PRESSURE: 92 MMHG | WEIGHT: 247 LBS | BODY MASS INDEX: 33.46 KG/M2

## 2020-01-01 VITALS
HEART RATE: 100 BPM | HEIGHT: 72 IN | WEIGHT: 244 LBS | RESPIRATION RATE: 18 BRPM | SYSTOLIC BLOOD PRESSURE: 127 MMHG | OXYGEN SATURATION: 95 % | BODY MASS INDEX: 33.05 KG/M2 | DIASTOLIC BLOOD PRESSURE: 79 MMHG | TEMPERATURE: 97.7 F

## 2020-01-01 VITALS
OXYGEN SATURATION: 94 % | HEIGHT: 72 IN | RESPIRATION RATE: 21 BRPM | DIASTOLIC BLOOD PRESSURE: 73 MMHG | SYSTOLIC BLOOD PRESSURE: 107 MMHG | DIASTOLIC BLOOD PRESSURE: 64 MMHG | WEIGHT: 242 LBS | BODY MASS INDEX: 31.83 KG/M2 | SYSTOLIC BLOOD PRESSURE: 122 MMHG | HEART RATE: 88 BPM | TEMPERATURE: 97.5 F | HEART RATE: 109 BPM | BODY MASS INDEX: 32.78 KG/M2 | HEIGHT: 72 IN | WEIGHT: 235 LBS

## 2020-01-01 VITALS
HEART RATE: 64 BPM | WEIGHT: 247.4 LBS | SYSTOLIC BLOOD PRESSURE: 110 MMHG | DIASTOLIC BLOOD PRESSURE: 70 MMHG | HEIGHT: 72 IN | OXYGEN SATURATION: 96 % | RESPIRATION RATE: 18 BRPM | TEMPERATURE: 93.6 F | BODY MASS INDEX: 33.51 KG/M2

## 2020-01-01 VITALS
HEIGHT: 72 IN | SYSTOLIC BLOOD PRESSURE: 104 MMHG | DIASTOLIC BLOOD PRESSURE: 63 MMHG | TEMPERATURE: 97.8 F | BODY MASS INDEX: 31.42 KG/M2 | HEART RATE: 98 BPM | RESPIRATION RATE: 17 BRPM | OXYGEN SATURATION: 95 % | WEIGHT: 232 LBS

## 2020-01-01 VITALS
HEART RATE: 80 BPM | TEMPERATURE: 98.6 F | OXYGEN SATURATION: 72 % | RESPIRATION RATE: 14 BRPM | SYSTOLIC BLOOD PRESSURE: 61 MMHG | DIASTOLIC BLOOD PRESSURE: 48 MMHG

## 2020-01-01 VITALS
BODY MASS INDEX: 32.78 KG/M2 | SYSTOLIC BLOOD PRESSURE: 120 MMHG | HEART RATE: 96 BPM | HEIGHT: 72 IN | DIASTOLIC BLOOD PRESSURE: 70 MMHG | OXYGEN SATURATION: 94 % | WEIGHT: 242 LBS | RESPIRATION RATE: 24 BRPM

## 2020-01-01 VITALS
WEIGHT: 242.8 LBS | HEIGHT: 72 IN | RESPIRATION RATE: 14 BRPM | SYSTOLIC BLOOD PRESSURE: 122 MMHG | BODY MASS INDEX: 32.89 KG/M2 | HEART RATE: 72 BPM | DIASTOLIC BLOOD PRESSURE: 66 MMHG

## 2020-01-01 VITALS
SYSTOLIC BLOOD PRESSURE: 57 MMHG | HEART RATE: 80 BPM | HEIGHT: 72 IN | BODY MASS INDEX: 32.53 KG/M2 | RESPIRATION RATE: 18 BRPM | WEIGHT: 240.2 LBS | OXYGEN SATURATION: 95 % | TEMPERATURE: 95.4 F | DIASTOLIC BLOOD PRESSURE: 45 MMHG

## 2020-01-01 VITALS
HEIGHT: 72 IN | TEMPERATURE: 97.7 F | WEIGHT: 240 LBS | SYSTOLIC BLOOD PRESSURE: 100 MMHG | HEART RATE: 72 BPM | RESPIRATION RATE: 18 BRPM | DIASTOLIC BLOOD PRESSURE: 60 MMHG | BODY MASS INDEX: 32.51 KG/M2 | OXYGEN SATURATION: 96 %

## 2020-01-01 VITALS
BODY MASS INDEX: 33.21 KG/M2 | WEIGHT: 245.2 LBS | HEART RATE: 80 BPM | HEIGHT: 72 IN | DIASTOLIC BLOOD PRESSURE: 60 MMHG | RESPIRATION RATE: 20 BRPM | SYSTOLIC BLOOD PRESSURE: 106 MMHG | OXYGEN SATURATION: 98 %

## 2020-01-01 VITALS
HEART RATE: 103 BPM | OXYGEN SATURATION: 97 % | DIASTOLIC BLOOD PRESSURE: 54 MMHG | SYSTOLIC BLOOD PRESSURE: 100 MMHG | BODY MASS INDEX: 33.09 KG/M2 | WEIGHT: 244 LBS

## 2020-01-01 LAB
-: NORMAL
-: NORMAL
ABSOLUTE EOS #: 0.03 K/UL (ref 0–0.44)
ABSOLUTE EOS #: 0.1 K/UL (ref 0–0.4)
ABSOLUTE EOS #: 0.15 K/UL (ref 0–0.44)
ABSOLUTE EOS #: 0.17 K/UL (ref 0–0.44)
ABSOLUTE EOS #: 0.22 K/UL (ref 0–0.4)
ABSOLUTE EOS #: 0.24 K/UL (ref 0–0.44)
ABSOLUTE EOS #: 0.29 K/UL (ref 0–0.44)
ABSOLUTE EOS #: 0.47 K/UL (ref 0–0.44)
ABSOLUTE EOS #: 0.48 K/UL (ref 0–0.44)
ABSOLUTE EOS #: 0.56 K/UL (ref 0–0.44)
ABSOLUTE IMMATURE GRANULOCYTE: 0 K/UL (ref 0–0.3)
ABSOLUTE IMMATURE GRANULOCYTE: 0.05 K/UL (ref 0–0.3)
ABSOLUTE IMMATURE GRANULOCYTE: <0.03 K/UL (ref 0–0.3)
ABSOLUTE LYMPH #: 0.24 K/UL (ref 1–4.8)
ABSOLUTE LYMPH #: 0.28 K/UL (ref 1–4.8)
ABSOLUTE LYMPH #: 0.33 K/UL (ref 1.1–3.7)
ABSOLUTE LYMPH #: 0.39 K/UL (ref 1.1–3.7)
ABSOLUTE LYMPH #: 0.46 K/UL (ref 1.1–3.7)
ABSOLUTE LYMPH #: 0.58 K/UL (ref 1.1–3.7)
ABSOLUTE LYMPH #: 0.61 K/UL (ref 1.1–3.7)
ABSOLUTE LYMPH #: 0.65 K/UL (ref 1.1–3.7)
ABSOLUTE LYMPH #: 0.75 K/UL (ref 1.1–3.7)
ABSOLUTE LYMPH #: 0.96 K/UL (ref 1.1–3.7)
ABSOLUTE MONO #: 0.11 K/UL (ref 0.1–1.2)
ABSOLUTE MONO #: 0.24 K/UL (ref 0.1–1.2)
ABSOLUTE MONO #: 0.24 K/UL (ref 0.1–1.2)
ABSOLUTE MONO #: 0.3 K/UL (ref 0.1–1.2)
ABSOLUTE MONO #: 0.33 K/UL (ref 0.1–1.2)
ABSOLUTE MONO #: 0.35 K/UL (ref 0.1–1.2)
ABSOLUTE MONO #: 0.37 K/UL (ref 0.1–1.2)
ABSOLUTE MONO #: 0.39 K/UL (ref 0.1–1.2)
ABSOLUTE MONO #: 0.58 K/UL (ref 0.1–1.2)
ABSOLUTE MONO #: 1.12 K/UL (ref 0.1–1.2)
ALBUMIN SERPL-MCNC: 3.6 G/DL (ref 3.5–5.2)
ALBUMIN SERPL-MCNC: 3.6 G/DL (ref 3.5–5.2)
ALBUMIN SERPL-MCNC: 3.9 G/DL (ref 3.5–5.2)
ALBUMIN SERPL-MCNC: 3.9 G/DL (ref 3.5–5.2)
ALBUMIN/GLOBULIN RATIO: 1.3 (ref 1–2.5)
ALBUMIN/GLOBULIN RATIO: 1.4 (ref 1–2.5)
ALP BLD-CCNC: 129 U/L (ref 40–129)
ALP BLD-CCNC: 141 U/L (ref 40–129)
ALT SERPL-CCNC: 15 U/L (ref 5–41)
ALT SERPL-CCNC: 31 U/L (ref 5–41)
AMORPHOUS: NORMAL
AMORPHOUS: NORMAL
ANION GAP SERPL CALCULATED.3IONS-SCNC: 10 MMOL/L (ref 9–17)
ANION GAP SERPL CALCULATED.3IONS-SCNC: 10 MMOL/L (ref 9–17)
ANION GAP SERPL CALCULATED.3IONS-SCNC: 11 MMOL/L (ref 9–17)
ANION GAP SERPL CALCULATED.3IONS-SCNC: 11 MMOL/L (ref 9–17)
ANION GAP SERPL CALCULATED.3IONS-SCNC: 13 MMOL/L (ref 9–17)
ANION GAP SERPL CALCULATED.3IONS-SCNC: 14 MMOL/L (ref 9–17)
ANION GAP SERPL CALCULATED.3IONS-SCNC: 14 MMOL/L (ref 9–17)
ANION GAP SERPL CALCULATED.3IONS-SCNC: 15 MMOL/L (ref 9–17)
ANION GAP SERPL CALCULATED.3IONS-SCNC: 17 MMOL/L (ref 9–17)
ANION GAP SERPL CALCULATED.3IONS-SCNC: 17 MMOL/L (ref 9–17)
ANION GAP SERPL CALCULATED.3IONS-SCNC: 18 MMOL/L (ref 9–17)
ANION GAP SERPL CALCULATED.3IONS-SCNC: 19 MMOL/L (ref 9–17)
ANION GAP SERPL CALCULATED.3IONS-SCNC: 19 MMOL/L (ref 9–17)
ANION GAP SERPL CALCULATED.3IONS-SCNC: 8 MMOL/L (ref 9–17)
ANION GAP SERPL CALCULATED.3IONS-SCNC: 9 MMOL/L (ref 9–17)
APPEARANCE FLUID: ABNORMAL
APPEARANCE FLUID: CLEAR
AST SERPL-CCNC: 20 U/L
AST SERPL-CCNC: 35 U/L
ATYPICAL LYMPHOCYTE ABSOLUTE COUNT: 0.05 K/UL
ATYPICAL LYMPHOCYTES: 1 %
BACTERIA: NORMAL
BACTERIA: NORMAL
BASO FLUID: ABNORMAL %
BASO FLUID: NORMAL %
BASOPHILS # BLD: 0 % (ref 0–2)
BASOPHILS # BLD: 0 % (ref 0–2)
BASOPHILS # BLD: 1 % (ref 0–2)
BASOPHILS # BLD: 2 % (ref 0–2)
BASOPHILS ABSOLUTE: 0 K/UL (ref 0–0.2)
BASOPHILS ABSOLUTE: 0.04 K/UL (ref 0–0.2)
BASOPHILS ABSOLUTE: 0.05 K/UL (ref 0–0.2)
BASOPHILS ABSOLUTE: 0.06 K/UL (ref 0–0.2)
BASOPHILS ABSOLUTE: 0.06 K/UL (ref 0–0.2)
BASOPHILS ABSOLUTE: 0.07 K/UL (ref 0–0.2)
BASOPHILS ABSOLUTE: 0.07 K/UL (ref 0–0.2)
BASOPHILS ABSOLUTE: 0.09 K/UL (ref 0–0.2)
BILIRUB SERPL-MCNC: 0.35 MG/DL (ref 0.3–1.2)
BILIRUB SERPL-MCNC: 0.42 MG/DL (ref 0.3–1.2)
BILIRUBIN URINE: NEGATIVE
BILIRUBIN URINE: NEGATIVE
BNP INTERPRETATION: ABNORMAL
BUN BLDV-MCNC: 30 MG/DL (ref 8–23)
BUN BLDV-MCNC: 32 MG/DL (ref 8–23)
BUN BLDV-MCNC: 35 MG/DL (ref 8–23)
BUN BLDV-MCNC: 36 MG/DL (ref 8–23)
BUN BLDV-MCNC: 37 MG/DL (ref 8–23)
BUN BLDV-MCNC: 39 MG/DL (ref 8–23)
BUN BLDV-MCNC: 49 MG/DL (ref 8–23)
BUN BLDV-MCNC: 62 MG/DL (ref 8–23)
BUN BLDV-MCNC: 69 MG/DL (ref 8–23)
BUN BLDV-MCNC: 72 MG/DL (ref 8–23)
BUN BLDV-MCNC: 77 MG/DL (ref 8–23)
BUN BLDV-MCNC: 81 MG/DL (ref 8–23)
BUN/CREAT BLD: 14 (ref 9–20)
BUN/CREAT BLD: 15 (ref 9–20)
BUN/CREAT BLD: 16 (ref 9–20)
BUN/CREAT BLD: 17 (ref 9–20)
BUN/CREAT BLD: 18 (ref 9–20)
BUN/CREAT BLD: 19 (ref 9–20)
BUN/CREAT BLD: 20 (ref 9–20)
BUN/CREAT BLD: 20 (ref 9–20)
CALCIUM SERPL-MCNC: 6.4 MG/DL (ref 8.6–10.4)
CALCIUM SERPL-MCNC: 6.7 MG/DL (ref 8.6–10.4)
CALCIUM SERPL-MCNC: 6.8 MG/DL (ref 8.6–10.4)
CALCIUM SERPL-MCNC: 7.1 MG/DL (ref 8.6–10.4)
CALCIUM SERPL-MCNC: 7.4 MG/DL (ref 8.6–10.4)
CALCIUM SERPL-MCNC: 7.8 MG/DL (ref 8.6–10.4)
CALCIUM SERPL-MCNC: 7.8 MG/DL (ref 8.6–10.4)
CALCIUM SERPL-MCNC: 7.9 MG/DL (ref 8.6–10.4)
CALCIUM SERPL-MCNC: 8.2 MG/DL (ref 8.6–10.4)
CALCIUM SERPL-MCNC: 8.2 MG/DL (ref 8.6–10.4)
CALCIUM SERPL-MCNC: 8.3 MG/DL (ref 8.6–10.4)
CALCIUM SERPL-MCNC: 8.3 MG/DL (ref 8.6–10.4)
CALCIUM SERPL-MCNC: 8.4 MG/DL (ref 8.6–10.4)
CALCIUM SERPL-MCNC: 8.5 MG/DL (ref 8.6–10.4)
CASE NUMBER:: NORMAL
CASTS UA: NORMAL /LPF (ref 0–2)
CHLORIDE BLD-SCNC: 100 MMOL/L (ref 98–107)
CHLORIDE BLD-SCNC: 101 MMOL/L (ref 98–107)
CHLORIDE BLD-SCNC: 101 MMOL/L (ref 98–107)
CHLORIDE BLD-SCNC: 103 MMOL/L (ref 98–107)
CHLORIDE BLD-SCNC: 104 MMOL/L (ref 98–107)
CHLORIDE BLD-SCNC: 105 MMOL/L (ref 98–107)
CHLORIDE BLD-SCNC: 95 MMOL/L (ref 98–107)
CHLORIDE BLD-SCNC: 96 MMOL/L (ref 98–107)
CHLORIDE BLD-SCNC: 97 MMOL/L (ref 98–107)
CHLORIDE BLD-SCNC: 97 MMOL/L (ref 98–107)
CHLORIDE BLD-SCNC: 99 MMOL/L (ref 98–107)
CHOLESTEROL/HDL RATIO: 3.1
CHOLESTEROL/HDL RATIO: 3.7
CHOLESTEROL/HDL RATIO: 4.9
CHOLESTEROL: 78 MG/DL
CHOLESTEROL: 88 MG/DL
CHOLESTEROL: 97 MG/DL
CO2: 17 MMOL/L (ref 20–31)
CO2: 17 MMOL/L (ref 20–31)
CO2: 20 MMOL/L (ref 20–31)
CO2: 20 MMOL/L (ref 20–31)
CO2: 21 MMOL/L (ref 20–31)
CO2: 24 MMOL/L (ref 20–31)
CO2: 25 MMOL/L (ref 20–31)
CO2: 26 MMOL/L (ref 20–31)
CO2: 27 MMOL/L (ref 20–31)
CO2: 27 MMOL/L (ref 20–31)
CO2: 28 MMOL/L (ref 20–31)
CO2: 29 MMOL/L (ref 20–31)
CO2: 29 MMOL/L (ref 20–31)
COLOR FLUID: ABNORMAL
COLOR FLUID: COLORLESS
COLOR: NORMAL
COLOR: NORMAL
COMMENT UA: NORMAL
COMMENT UA: NORMAL
CREAT SERPL-MCNC: 1.78 MG/DL (ref 0.7–1.2)
CREAT SERPL-MCNC: 1.86 MG/DL (ref 0.7–1.2)
CREAT SERPL-MCNC: 1.92 MG/DL (ref 0.7–1.2)
CREAT SERPL-MCNC: 1.94 MG/DL (ref 0.7–1.2)
CREAT SERPL-MCNC: 1.99 MG/DL (ref 0.7–1.2)
CREAT SERPL-MCNC: 2 MG/DL (ref 0.7–1.2)
CREAT SERPL-MCNC: 2.04 MG/DL (ref 0.7–1.2)
CREAT SERPL-MCNC: 2.04 MG/DL (ref 0.7–1.2)
CREAT SERPL-MCNC: 2.12 MG/DL (ref 0.7–1.2)
CREAT SERPL-MCNC: 2.16 MG/DL (ref 0.7–1.2)
CREAT SERPL-MCNC: 2.2 MG/DL (ref 0.7–1.2)
CREAT SERPL-MCNC: 2.99 MG/DL (ref 0.7–1.2)
CREAT SERPL-MCNC: 4.07 MG/DL (ref 0.7–1.2)
CREAT SERPL-MCNC: 4.48 MG/DL (ref 0.7–1.2)
CREAT SERPL-MCNC: 4.83 MG/DL (ref 0.7–1.2)
CREAT SERPL-MCNC: 5.33 MG/DL (ref 0.7–1.2)
CREAT SERPL-MCNC: 5.51 MG/DL (ref 0.7–1.2)
CRYSTALS, FLUID: NEGATIVE
CRYSTALS, UA: NORMAL /HPF
CRYSTALS, UA: NORMAL /HPF
CULTURE: NORMAL
D-DIMER QUANTITATIVE: 3.32 MG/L FEU (ref 0–0.59)
DIFFERENTIAL TYPE: ABNORMAL
DIRECT EXAM: NORMAL
EKG ATRIAL RATE: 111 BPM
EKG ATRIAL RATE: 86 BPM
EKG ATRIAL RATE: 87 BPM
EKG ATRIAL RATE: 96 BPM
EKG P-R INTERVAL: 174 MS
EKG Q-T INTERVAL: 388 MS
EKG Q-T INTERVAL: 398 MS
EKG Q-T INTERVAL: 440 MS
EKG Q-T INTERVAL: 492 MS
EKG QRS DURATION: 136 MS
EKG QRS DURATION: 140 MS
EKG QRS DURATION: 184 MS
EKG QRS DURATION: 206 MS
EKG QTC CALCULATION (BAZETT): 502 MS
EKG QTC CALCULATION (BAZETT): 510 MS
EKG QTC CALCULATION (BAZETT): 555 MS
EKG QTC CALCULATION (BAZETT): 598 MS
EKG R AXIS: -114 DEGREES
EKG R AXIS: -151 DEGREES
EKG R AXIS: -44 DEGREES
EKG R AXIS: -46 DEGREES
EKG T AXIS: 130 DEGREES
EKG T AXIS: 139 DEGREES
EKG T AXIS: 57 DEGREES
EKG T AXIS: 62 DEGREES
EKG VENTRICULAR RATE: 104 BPM
EKG VENTRICULAR RATE: 89 BPM
EKG VENTRICULAR RATE: 96 BPM
EKG VENTRICULAR RATE: 96 BPM
EOSINOPHIL FLUID: ABNORMAL %
EOSINOPHIL FLUID: NORMAL %
EOSINOPHILS RELATIVE PERCENT: 0 % (ref 1–4)
EOSINOPHILS RELATIVE PERCENT: 10 % (ref 1–4)
EOSINOPHILS RELATIVE PERCENT: 2 % (ref 1–4)
EOSINOPHILS RELATIVE PERCENT: 2 % (ref 1–8)
EOSINOPHILS RELATIVE PERCENT: 3 % (ref 1–4)
EOSINOPHILS RELATIVE PERCENT: 4 % (ref 1–8)
EOSINOPHILS RELATIVE PERCENT: 5 % (ref 1–4)
EOSINOPHILS RELATIVE PERCENT: 5 % (ref 1–4)
EOSINOPHILS RELATIVE PERCENT: 8 % (ref 1–4)
EOSINOPHILS RELATIVE PERCENT: 9 % (ref 1–4)
EPITHELIAL CELLS UA: NORMAL /HPF (ref 0–5)
EPITHELIAL CELLS UA: NORMAL /HPF (ref 0–5)
ESTIMATED AVERAGE GLUCOSE: 180 MG/DL
FLOW CYTOMETRY SOURCE: NORMAL
FLOW CYTOMETRY, NODE/FLUID: NORMAL
FLUID DIFF COMMENT: ABNORMAL
FLUID DIFF COMMENT: NORMAL
GFR AFRICAN AMERICAN: 12 ML/MIN
GFR AFRICAN AMERICAN: 13 ML/MIN
GFR AFRICAN AMERICAN: 14 ML/MIN
GFR AFRICAN AMERICAN: 15 ML/MIN
GFR AFRICAN AMERICAN: 17 ML/MIN
GFR AFRICAN AMERICAN: 25 ML/MIN
GFR AFRICAN AMERICAN: 35 ML/MIN
GFR AFRICAN AMERICAN: 36 ML/MIN
GFR AFRICAN AMERICAN: 37 ML/MIN
GFR AFRICAN AMERICAN: 38 ML/MIN
GFR AFRICAN AMERICAN: 38 ML/MIN
GFR AFRICAN AMERICAN: 39 ML/MIN
GFR AFRICAN AMERICAN: 39 ML/MIN
GFR AFRICAN AMERICAN: 41 ML/MIN
GFR AFRICAN AMERICAN: 41 ML/MIN
GFR AFRICAN AMERICAN: 43 ML/MIN
GFR AFRICAN AMERICAN: 45 ML/MIN
GFR NON-AFRICAN AMERICAN: 10 ML/MIN
GFR NON-AFRICAN AMERICAN: 10 ML/MIN
GFR NON-AFRICAN AMERICAN: 12 ML/MIN
GFR NON-AFRICAN AMERICAN: 13 ML/MIN
GFR NON-AFRICAN AMERICAN: 14 ML/MIN
GFR NON-AFRICAN AMERICAN: 20 ML/MIN
GFR NON-AFRICAN AMERICAN: 29 ML/MIN
GFR NON-AFRICAN AMERICAN: 29 ML/MIN
GFR NON-AFRICAN AMERICAN: 30 ML/MIN
GFR NON-AFRICAN AMERICAN: 32 ML/MIN
GFR NON-AFRICAN AMERICAN: 33 ML/MIN
GFR NON-AFRICAN AMERICAN: 34 ML/MIN
GFR NON-AFRICAN AMERICAN: 35 ML/MIN
GFR NON-AFRICAN AMERICAN: 37 ML/MIN
GFR SERPL CREATININE-BSD FRML MDRD: ABNORMAL ML/MIN/{1.73_M2}
GLUCOSE BLD-MCNC: 104 MG/DL (ref 75–110)
GLUCOSE BLD-MCNC: 110 MG/DL (ref 75–110)
GLUCOSE BLD-MCNC: 112 MG/DL (ref 75–110)
GLUCOSE BLD-MCNC: 118 MG/DL (ref 75–110)
GLUCOSE BLD-MCNC: 119 MG/DL (ref 70–99)
GLUCOSE BLD-MCNC: 120 MG/DL (ref 75–110)
GLUCOSE BLD-MCNC: 121 MG/DL (ref 70–99)
GLUCOSE BLD-MCNC: 128 MG/DL (ref 70–99)
GLUCOSE BLD-MCNC: 129 MG/DL (ref 70–99)
GLUCOSE BLD-MCNC: 129 MG/DL (ref 75–110)
GLUCOSE BLD-MCNC: 130 MG/DL (ref 70–99)
GLUCOSE BLD-MCNC: 130 MG/DL (ref 70–99)
GLUCOSE BLD-MCNC: 136 MG/DL (ref 75–110)
GLUCOSE BLD-MCNC: 138 MG/DL (ref 70–99)
GLUCOSE BLD-MCNC: 144 MG/DL (ref 70–99)
GLUCOSE BLD-MCNC: 144 MG/DL (ref 70–99)
GLUCOSE BLD-MCNC: 153 MG/DL (ref 70–99)
GLUCOSE BLD-MCNC: 158 MG/DL (ref 70–99)
GLUCOSE BLD-MCNC: 173 MG/DL (ref 75–110)
GLUCOSE BLD-MCNC: 273 MG/DL (ref 70–99)
GLUCOSE BLD-MCNC: 40 MG/DL (ref 75–110)
GLUCOSE BLD-MCNC: 43 MG/DL (ref 75–110)
GLUCOSE BLD-MCNC: 48 MG/DL (ref 70–99)
GLUCOSE BLD-MCNC: 64 MG/DL (ref 75–110)
GLUCOSE BLD-MCNC: 67 MG/DL (ref 75–110)
GLUCOSE BLD-MCNC: 68 MG/DL (ref 75–110)
GLUCOSE BLD-MCNC: 70 MG/DL (ref 75–110)
GLUCOSE BLD-MCNC: 71 MG/DL (ref 75–110)
GLUCOSE BLD-MCNC: 77 MG/DL (ref 70–99)
GLUCOSE BLD-MCNC: 81 MG/DL (ref 75–110)
GLUCOSE BLD-MCNC: 83 MG/DL (ref 70–99)
GLUCOSE BLD-MCNC: 88 MG/DL (ref 75–110)
GLUCOSE BLD-MCNC: 92 MG/DL (ref 70–99)
GLUCOSE BLD-MCNC: 94 MG/DL (ref 75–110)
GLUCOSE BLD-MCNC: 97 MG/DL (ref 70–99)
GLUCOSE BLD-MCNC: 98 MG/DL (ref 75–110)
GLUCOSE URINE: NEGATIVE
GLUCOSE URINE: NEGATIVE
GLUCOSE, FLUID: 108 MG/DL
HBA1C MFR BLD: 7.9 % (ref 4.8–5.9)
HCT VFR BLD CALC: 32.7 % (ref 40.7–50.3)
HCT VFR BLD CALC: 32.9 % (ref 40.7–50.3)
HCT VFR BLD CALC: 33 % (ref 40.7–50.3)
HCT VFR BLD CALC: 33 % (ref 40.7–50.3)
HCT VFR BLD CALC: 34 % (ref 40.7–50.3)
HCT VFR BLD CALC: 34.6 % (ref 40.7–50.3)
HCT VFR BLD CALC: 34.7 % (ref 40.7–50.3)
HCT VFR BLD CALC: 35.3 % (ref 40.7–50.3)
HCT VFR BLD CALC: 36.2 % (ref 40.7–50.3)
HCT VFR BLD CALC: 37.4 % (ref 40.7–50.3)
HCT VFR BLD CALC: 38.2 % (ref 40.7–50.3)
HCT VFR BLD CALC: 38.5 % (ref 40.7–50.3)
HDLC SERPL-MCNC: 20 MG/DL
HDLC SERPL-MCNC: 24 MG/DL
HDLC SERPL-MCNC: 25 MG/DL
HEMOGLOBIN: 10.3 G/DL (ref 13–17)
HEMOGLOBIN: 10.6 G/DL (ref 13–17)
HEMOGLOBIN: 10.6 G/DL (ref 13–17)
HEMOGLOBIN: 10.8 G/DL (ref 13–17)
HEMOGLOBIN: 11.3 G/DL (ref 13–17)
HEMOGLOBIN: 11.3 G/DL (ref 13–17)
HEMOGLOBIN: 11.6 G/DL (ref 13–17)
HEMOGLOBIN: 11.6 G/DL (ref 13–17)
HEMOGLOBIN: 9.7 G/DL (ref 13–17)
HEMOGLOBIN: 9.8 G/DL (ref 13–17)
HEMOGLOBIN: 9.8 G/DL (ref 13–17)
HEMOGLOBIN: 9.9 G/DL (ref 13–17)
IMMATURE GRANULOCYTES: 0 %
INR BLD: 1.4
INR BLD: 1.5
IRON SATURATION: 11 % (ref 20–55)
IRON: 33 UG/DL (ref 59–158)
KETONES, URINE: NEGATIVE
KETONES, URINE: NEGATIVE
LACTATE DEHYDROGENASE, FLUID: 167 U/L
LACTIC ACID: 0.9 MMOL/L (ref 0.5–2.2)
LACTIC ACID: 1.1 MMOL/L (ref 0.5–2.2)
LDL CHOLESTEROL: 39 MG/DL (ref 0–130)
LDL CHOLESTEROL: 52 MG/DL (ref 0–130)
LDL CHOLESTEROL: 59 MG/DL (ref 0–130)
LEUKOCYTE ESTERASE, URINE: NEGATIVE
LEUKOCYTE ESTERASE, URINE: NEGATIVE
LV EF: 15 %
LVEF MODALITY: NORMAL
LYMPHOCYTES # BLD: 10 % (ref 24–43)
LYMPHOCYTES # BLD: 13 % (ref 24–43)
LYMPHOCYTES # BLD: 17 % (ref 24–43)
LYMPHOCYTES # BLD: 5 % (ref 15–43)
LYMPHOCYTES # BLD: 5 % (ref 15–43)
LYMPHOCYTES # BLD: 7 % (ref 24–43)
LYMPHOCYTES # BLD: 8 % (ref 24–43)
LYMPHOCYTES # BLD: 8 % (ref 24–43)
LYMPHOCYTES, BODY FLUID: 93 %
LYMPHOCYTES, BODY FLUID: NORMAL %
Lab: NORMAL
MAGNESIUM: 1.9 MG/DL (ref 1.6–2.6)
MAGNESIUM: 2.2 MG/DL (ref 1.6–2.6)
MCH RBC QN AUTO: 26.5 PG (ref 25.2–33.5)
MCH RBC QN AUTO: 26.5 PG (ref 25.2–33.5)
MCH RBC QN AUTO: 26.7 PG (ref 25.2–33.5)
MCH RBC QN AUTO: 26.8 PG (ref 25.2–33.5)
MCH RBC QN AUTO: 26.9 PG (ref 25.2–33.5)
MCH RBC QN AUTO: 27 PG (ref 25.2–33.5)
MCH RBC QN AUTO: 28.4 PG (ref 25.2–33.5)
MCH RBC QN AUTO: 28.6 PG (ref 25.2–33.5)
MCH RBC QN AUTO: 28.9 PG (ref 25.2–33.5)
MCH RBC QN AUTO: 28.9 PG (ref 25.2–33.5)
MCHC RBC AUTO-ENTMCNC: 29.4 G/DL (ref 25.2–33.5)
MCHC RBC AUTO-ENTMCNC: 29.7 G/DL (ref 25.2–33.5)
MCHC RBC AUTO-ENTMCNC: 30 G/DL (ref 25.2–33.5)
MCHC RBC AUTO-ENTMCNC: 30.1 G/DL (ref 25.2–33.5)
MCHC RBC AUTO-ENTMCNC: 30.3 G/DL (ref 25.2–33.5)
MCHC RBC AUTO-ENTMCNC: 30.4 G/DL (ref 25.2–33.5)
MCHC RBC AUTO-ENTMCNC: 30.5 G/DL (ref 25.2–33.5)
MCHC RBC AUTO-ENTMCNC: 31 G/DL (ref 25.2–33.5)
MCHC RBC AUTO-ENTMCNC: 31.2 G/DL (ref 25.2–33.5)
MCHC RBC AUTO-ENTMCNC: 31.2 G/DL (ref 25.2–33.5)
MCV RBC AUTO: 87.6 FL (ref 82.6–102.9)
MCV RBC AUTO: 87.8 FL (ref 82.6–102.9)
MCV RBC AUTO: 89.1 FL (ref 82.6–102.9)
MCV RBC AUTO: 89.9 FL (ref 82.6–102.9)
MCV RBC AUTO: 90.2 FL (ref 82.6–102.9)
MCV RBC AUTO: 90.4 FL (ref 82.6–102.9)
MCV RBC AUTO: 90.4 FL (ref 82.6–102.9)
MCV RBC AUTO: 90.8 FL (ref 82.6–102.9)
MCV RBC AUTO: 91.6 FL (ref 82.6–102.9)
MCV RBC AUTO: 92.5 FL (ref 82.6–102.9)
MCV RBC AUTO: 93 FL (ref 82.6–102.9)
MCV RBC AUTO: 93.3 FL (ref 82.6–102.9)
MONOCYTE, FLUID: 2 %
MONOCYTE, FLUID: NORMAL %
MONOCYTES # BLD: 10 % (ref 3–12)
MONOCYTES # BLD: 2 % (ref 6–14)
MONOCYTES # BLD: 5 % (ref 3–12)
MONOCYTES # BLD: 5 % (ref 6–14)
MONOCYTES # BLD: 6 % (ref 3–12)
MONOCYTES # BLD: 7 % (ref 3–12)
MONOCYTES # BLD: 7 % (ref 3–12)
MONOCYTES # BLD: 8 % (ref 3–12)
MORPHOLOGY: ABNORMAL
MUCUS: NORMAL
MUCUS: NORMAL
MYOGLOBIN: 122 NG/ML (ref 28–72)
NEUTROPHIL, FLUID: 2 %
NEUTROPHIL, FLUID: NORMAL %
NITRITE, URINE: NEGATIVE
NITRITE, URINE: NEGATIVE
NRBC AUTOMATED: 0 PER 100 WBC
OTHER CELLS FLUID: 3 %
OTHER CELLS FLUID: NORMAL %
OTHER OBSERVATIONS UA: NORMAL
OTHER OBSERVATIONS UA: NORMAL
PARTIAL THROMBOPLASTIN TIME: 33.6 SEC (ref 23.9–33.8)
PARTIAL THROMBOPLASTIN TIME: 33.9 SEC (ref 23.9–33.8)
PARTIAL THROMBOPLASTIN TIME: 34 SEC (ref 23.9–33.8)
PDW BLD-RTO: 14.6 % (ref 11.8–14.4)
PDW BLD-RTO: 14.8 % (ref 11.8–14.4)
PDW BLD-RTO: 14.8 % (ref 11.8–14.4)
PDW BLD-RTO: 14.9 % (ref 11.8–14.4)
PDW BLD-RTO: 15.1 % (ref 11.8–14.4)
PDW BLD-RTO: 15.3 % (ref 11.8–14.4)
PDW BLD-RTO: 15.4 % (ref 11.8–14.4)
PDW BLD-RTO: 15.4 % (ref 11.8–14.4)
PDW BLD-RTO: 15.8 % (ref 11.8–14.4)
PDW BLD-RTO: 15.9 % (ref 11.8–14.4)
PDW BLD-RTO: 15.9 % (ref 11.8–14.4)
PDW BLD-RTO: 16.2 % (ref 11.8–14.4)
PH FLUID: 8
PH UA: 5 (ref 5–6)
PH UA: 5.5 (ref 5–6)
PLATELET # BLD: 135 K/UL (ref 138–453)
PLATELET # BLD: 147 K/UL (ref 138–453)
PLATELET # BLD: 148 K/UL (ref 138–453)
PLATELET # BLD: 157 K/UL (ref 138–453)
PLATELET # BLD: 166 K/UL (ref 138–453)
PLATELET # BLD: 170 K/UL (ref 138–453)
PLATELET # BLD: 171 K/UL (ref 138–453)
PLATELET # BLD: 173 K/UL (ref 138–453)
PLATELET # BLD: 174 K/UL (ref 138–453)
PLATELET # BLD: 176 K/UL (ref 138–453)
PLATELET # BLD: 177 K/UL (ref 138–453)
PLATELET # BLD: 225 K/UL (ref 138–453)
PLATELET ESTIMATE: ABNORMAL
PMV BLD AUTO: 10 FL (ref 8.1–13.5)
PMV BLD AUTO: 10 FL (ref 8.1–13.5)
PMV BLD AUTO: 10.2 FL (ref 8.1–13.5)
PMV BLD AUTO: 10.3 FL (ref 8.1–13.5)
PMV BLD AUTO: 10.5 FL (ref 8.1–13.5)
PMV BLD AUTO: 10.5 FL (ref 8.1–13.5)
PMV BLD AUTO: 11.3 FL (ref 8.1–13.5)
PMV BLD AUTO: 8.9 FL (ref 8.1–13.5)
PMV BLD AUTO: 9.4 FL (ref 8.1–13.5)
PMV BLD AUTO: 9.6 FL (ref 8.1–13.5)
PMV BLD AUTO: 9.9 FL (ref 8.1–13.5)
PMV BLD AUTO: 9.9 FL (ref 8.1–13.5)
POTASSIUM SERPL-SCNC: 3.3 MMOL/L (ref 3.7–5.3)
POTASSIUM SERPL-SCNC: 3.5 MMOL/L (ref 3.7–5.3)
POTASSIUM SERPL-SCNC: 3.6 MMOL/L (ref 3.7–5.3)
POTASSIUM SERPL-SCNC: 3.8 MMOL/L (ref 3.7–5.3)
POTASSIUM SERPL-SCNC: 3.9 MMOL/L (ref 3.7–5.3)
POTASSIUM SERPL-SCNC: 4 MMOL/L (ref 3.7–5.3)
POTASSIUM SERPL-SCNC: 4 MMOL/L (ref 3.7–5.3)
POTASSIUM SERPL-SCNC: 4.1 MMOL/L (ref 3.7–5.3)
POTASSIUM SERPL-SCNC: 4.2 MMOL/L (ref 3.7–5.3)
POTASSIUM SERPL-SCNC: 4.3 MMOL/L (ref 3.7–5.3)
POTASSIUM SERPL-SCNC: 4.6 MMOL/L (ref 3.7–5.3)
POTASSIUM SERPL-SCNC: 4.8 MMOL/L (ref 3.7–5.3)
POTASSIUM SERPL-SCNC: 5.5 MMOL/L (ref 3.7–5.3)
POTASSIUM SERPL-SCNC: 5.5 MMOL/L (ref 3.7–5.3)
POTASSIUM SERPL-SCNC: 5.6 MMOL/L (ref 3.7–5.3)
POTASSIUM SERPL-SCNC: 5.7 MMOL/L (ref 3.7–5.3)
POTASSIUM SERPL-SCNC: 5.8 MMOL/L (ref 3.7–5.3)
PRO-BNP: 4696 PG/ML
PRO-BNP: 5542 PG/ML
PRO-BNP: ABNORMAL PG/ML
PROTEIN UA: NEGATIVE
PROTEIN UA: NEGATIVE
PROTHROMBIN TIME: 16.4 SEC (ref 11.5–14.2)
PROTHROMBIN TIME: 16.6 SEC (ref 11.5–14.2)
PROTHROMBIN TIME: 17 SEC (ref 11.5–14.2)
PROTHROMBIN TIME: 17.4 SEC (ref 11.5–14.2)
RBC # BLD: 3.6 M/UL (ref 4.21–5.77)
RBC # BLD: 3.65 M/UL (ref 4.21–5.77)
RBC # BLD: 3.66 M/UL (ref 4.21–5.77)
RBC # BLD: 3.66 M/UL (ref 4.21–5.77)
RBC # BLD: 3.73 M/UL (ref 4.21–5.77)
RBC # BLD: 3.74 M/UL (ref 4.21–5.77)
RBC # BLD: 3.88 M/UL (ref 4.21–5.77)
RBC # BLD: 3.95 M/UL (ref 4.21–5.77)
RBC # BLD: 3.96 M/UL (ref 4.21–5.77)
RBC # BLD: 4.01 M/UL (ref 4.21–5.77)
RBC # BLD: 4.26 M/UL (ref 4.21–5.77)
RBC # BLD: 4.35 M/UL (ref 4.21–5.77)
RBC # BLD: ABNORMAL 10*6/UL
RBC FLUID: 8 /MM3
RBC FLUID: ABNORMAL /MM3
RBC UA: NORMAL /HPF (ref 0–4)
RBC UA: NORMAL /HPF (ref 0–4)
RENAL EPITHELIAL, UA: NORMAL /HPF
RENAL EPITHELIAL, UA: NORMAL /HPF
SARS-COV-2, NAA: NOT DETECTED
SARS-COV-2, RAPID: NOT DETECTED
SARS-COV-2, RAPID: NOT DETECTED
SARS-COV-2: NORMAL
SEG NEUTROPHILS: 65 % (ref 36–65)
SEG NEUTROPHILS: 70 % (ref 36–65)
SEG NEUTROPHILS: 75 % (ref 36–65)
SEG NEUTROPHILS: 80 % (ref 36–65)
SEG NEUTROPHILS: 81 % (ref 36–65)
SEG NEUTROPHILS: 82 % (ref 36–65)
SEG NEUTROPHILS: 82 % (ref 36–65)
SEG NEUTROPHILS: 83 % (ref 36–65)
SEG NEUTROPHILS: 86 % (ref 44–74)
SEG NEUTROPHILS: 89 % (ref 44–74)
SEGMENTED NEUTROPHILS ABSOLUTE COUNT: 3.58 K/UL (ref 1.5–8.1)
SEGMENTED NEUTROPHILS ABSOLUTE COUNT: 3.79 K/UL (ref 1.5–8.1)
SEGMENTED NEUTROPHILS ABSOLUTE COUNT: 3.84 K/UL (ref 1.5–8.1)
SEGMENTED NEUTROPHILS ABSOLUTE COUNT: 4.12 K/UL (ref 1.8–7.7)
SEGMENTED NEUTROPHILS ABSOLUTE COUNT: 4.5 K/UL (ref 1.5–8.1)
SEGMENTED NEUTROPHILS ABSOLUTE COUNT: 4.55 K/UL (ref 1.5–8.1)
SEGMENTED NEUTROPHILS ABSOLUTE COUNT: 4.64 K/UL (ref 1.5–8.1)
SEGMENTED NEUTROPHILS ABSOLUTE COUNT: 4.89 K/UL (ref 1.8–7.7)
SEGMENTED NEUTROPHILS ABSOLUTE COUNT: 5.92 K/UL (ref 1.5–8.1)
SEGMENTED NEUTROPHILS ABSOLUTE COUNT: 9.24 K/UL (ref 1.5–8.1)
SODIUM BLD-SCNC: 132 MMOL/L (ref 135–144)
SODIUM BLD-SCNC: 133 MMOL/L (ref 135–144)
SODIUM BLD-SCNC: 134 MMOL/L (ref 135–144)
SODIUM BLD-SCNC: 136 MMOL/L (ref 135–144)
SODIUM BLD-SCNC: 137 MMOL/L (ref 135–144)
SODIUM BLD-SCNC: 138 MMOL/L (ref 135–144)
SODIUM BLD-SCNC: 139 MMOL/L (ref 135–144)
SODIUM BLD-SCNC: 140 MMOL/L (ref 135–144)
SODIUM BLD-SCNC: 140 MMOL/L (ref 135–144)
SODIUM BLD-SCNC: 142 MMOL/L (ref 135–144)
SODIUM BLD-SCNC: 142 MMOL/L (ref 135–144)
SODIUM BLD-SCNC: 143 MMOL/L (ref 135–144)
SODIUM BLD-SCNC: 144 MMOL/L (ref 135–144)
SOURCE: NORMAL
SOURCE: NORMAL
SPECIFIC GRAVITY UA: 1.02 (ref 1.01–1.02)
SPECIFIC GRAVITY UA: 1.02 (ref 1.01–1.02)
SPECIMEN DESCRIPTION: NORMAL
SPECIMEN TYPE: ABNORMAL
SPECIMEN TYPE: NORMAL
SURGICAL PATHOLOGY REPORT: NORMAL
SURGICAL PATHOLOGY REPORT: NORMAL
TOTAL IRON BINDING CAPACITY: 302 UG/DL (ref 250–450)
TOTAL PROTEIN, BODY FLUID: 2.5 G/DL
TOTAL PROTEIN: 6.2 G/DL (ref 6.4–8.3)
TOTAL PROTEIN: 6.8 G/DL (ref 6.4–8.3)
TRICHOMONAS: NORMAL
TRICHOMONAS: NORMAL
TRIGL SERPL-MCNC: 59 MG/DL
TRIGL SERPL-MCNC: 68 MG/DL
TRIGL SERPL-MCNC: 90 MG/DL
TROPONIN INTERP: ABNORMAL
TROPONIN T: ABNORMAL NG/ML
TROPONIN, HIGH SENSITIVITY: 39 NG/L (ref 0–22)
TROPONIN, HIGH SENSITIVITY: 39 NG/L (ref 0–22)
TROPONIN, HIGH SENSITIVITY: 41 NG/L (ref 0–22)
TROPONIN, HIGH SENSITIVITY: 42 NG/L (ref 0–22)
TROPONIN, HIGH SENSITIVITY: 56 NG/L (ref 0–22)
TROPONIN, HIGH SENSITIVITY: 57 NG/L (ref 0–22)
TROPONIN, HIGH SENSITIVITY: 57 NG/L (ref 0–22)
TROPONIN, HIGH SENSITIVITY: 60 NG/L (ref 0–22)
TROPONIN, HIGH SENSITIVITY: 65 NG/L (ref 0–22)
TROPONIN, HIGH SENSITIVITY: 66 NG/L (ref 0–22)
TROPONIN, HIGH SENSITIVITY: 66 NG/L (ref 0–22)
TROPONIN, HIGH SENSITIVITY: 68 NG/L (ref 0–22)
TROPONIN, HIGH SENSITIVITY: 73 NG/L (ref 0–22)
TSH SERPL DL<=0.05 MIU/L-ACNC: 4.8 MIU/L (ref 0.3–5)
TSH SERPL DL<=0.05 MIU/L-ACNC: 5.99 MIU/L (ref 0.3–5)
TURBIDITY: NORMAL
TURBIDITY: NORMAL
UNSATURATED IRON BINDING CAPACITY: 269 UG/DL (ref 112–347)
URINE HGB: NEGATIVE
URINE HGB: NEGATIVE
UROBILINOGEN, URINE: NORMAL
UROBILINOGEN, URINE: NORMAL
VLDLC SERPL CALC-MCNC: ABNORMAL MG/DL (ref 1–30)
WBC # BLD: 11.2 K/UL (ref 3.5–11.3)
WBC # BLD: 4.7 K/UL (ref 3.5–11.3)
WBC # BLD: 4.8 K/UL (ref 3.5–11.3)
WBC # BLD: 5.1 K/UL (ref 3.5–11.3)
WBC # BLD: 5.2 K/UL (ref 3.5–11.3)
WBC # BLD: 5.3 K/UL (ref 3.5–11.3)
WBC # BLD: 5.5 K/UL (ref 3.5–11.3)
WBC # BLD: 5.5 K/UL (ref 3.5–11.3)
WBC # BLD: 5.8 K/UL (ref 3.5–11.3)
WBC # BLD: 5.8 K/UL (ref 3.5–11.3)
WBC # BLD: 6 K/UL (ref 3.5–11.3)
WBC # BLD: 7.3 K/UL (ref 3.5–11.3)
WBC # BLD: ABNORMAL 10*3/UL
WBC FLUID: 1 /MM3
WBC FLUID: 400 /MM3
WBC UA: NORMAL /HPF (ref 0–4)
WBC UA: NORMAL /HPF (ref 0–4)
YEAST: NORMAL
YEAST: NORMAL

## 2020-01-01 PROCEDURE — 96374 THER/PROPH/DIAG INJ IV PUSH: CPT

## 2020-01-01 PROCEDURE — 80048 BASIC METABOLIC PNL TOTAL CA: CPT

## 2020-01-01 PROCEDURE — 6360000004 HC RX CONTRAST MEDICATION: Performed by: INTERNAL MEDICINE

## 2020-01-01 PROCEDURE — 2060000000 HC ICU INTERMEDIATE R&B

## 2020-01-01 PROCEDURE — 6370000000 HC RX 637 (ALT 250 FOR IP): Performed by: NURSE PRACTITIONER

## 2020-01-01 PROCEDURE — 2580000003 HC RX 258: Performed by: INTERNAL MEDICINE

## 2020-01-01 PROCEDURE — 87205 SMEAR GRAM STAIN: CPT

## 2020-01-01 PROCEDURE — 96376 TX/PRO/DX INJ SAME DRUG ADON: CPT

## 2020-01-01 PROCEDURE — U0003 INFECTIOUS AGENT DETECTION BY NUCLEIC ACID (DNA OR RNA); SEVERE ACUTE RESPIRATORY SYNDROME CORONAVIRUS 2 (SARS-COV-2) (CORONAVIRUS DISEASE [COVID-19]), AMPLIFIED PROBE TECHNIQUE, MAKING USE OF HIGH THROUGHPUT TECHNOLOGIES AS DESCRIBED BY CMS-2020-01-R: HCPCS

## 2020-01-01 PROCEDURE — 2700000000 HC OXYGEN THERAPY PER DAY

## 2020-01-01 PROCEDURE — 6360000002 HC RX W HCPCS: Performed by: INTERNAL MEDICINE

## 2020-01-01 PROCEDURE — 85025 COMPLETE CBC W/AUTO DIFF WBC: CPT

## 2020-01-01 PROCEDURE — 93005 ELECTROCARDIOGRAM TRACING: CPT | Performed by: EMERGENCY MEDICINE

## 2020-01-01 PROCEDURE — 94760 N-INVAS EAR/PLS OXIMETRY 1: CPT

## 2020-01-01 PROCEDURE — 71046 X-RAY EXAM CHEST 2 VIEWS: CPT

## 2020-01-01 PROCEDURE — 1036F TOBACCO NON-USER: CPT | Performed by: NURSE PRACTITIONER

## 2020-01-01 PROCEDURE — G8427 DOCREV CUR MEDS BY ELIG CLIN: HCPCS | Performed by: FAMILY MEDICINE

## 2020-01-01 PROCEDURE — 82947 ASSAY GLUCOSE BLOOD QUANT: CPT

## 2020-01-01 PROCEDURE — 1111F DSCHRG MED/CURRENT MED MERGE: CPT | Performed by: INTERNAL MEDICINE

## 2020-01-01 PROCEDURE — 89051 BODY FLUID CELL COUNT: CPT

## 2020-01-01 PROCEDURE — 2580000003 HC RX 258: Performed by: NURSE PRACTITIONER

## 2020-01-01 PROCEDURE — 36415 COLL VENOUS BLD VENIPUNCTURE: CPT

## 2020-01-01 PROCEDURE — 82040 ASSAY OF SERUM ALBUMIN: CPT

## 2020-01-01 PROCEDURE — 4040F PNEUMOC VAC/ADMIN/RCVD: CPT | Performed by: NURSE PRACTITIONER

## 2020-01-01 PROCEDURE — 2500000003 HC RX 250 WO HCPCS: Performed by: INTERNAL MEDICINE

## 2020-01-01 PROCEDURE — 71045 X-RAY EXAM CHEST 1 VIEW: CPT

## 2020-01-01 PROCEDURE — 73502 X-RAY EXAM HIP UNI 2-3 VIEWS: CPT

## 2020-01-01 PROCEDURE — 85027 COMPLETE CBC AUTOMATED: CPT

## 2020-01-01 PROCEDURE — 6370000000 HC RX 637 (ALT 250 FOR IP): Performed by: INTERNAL MEDICINE

## 2020-01-01 PROCEDURE — 99222 1ST HOSP IP/OBS MODERATE 55: CPT | Performed by: INTERNAL MEDICINE

## 2020-01-01 PROCEDURE — 6360000002 HC RX W HCPCS: Performed by: NURSE PRACTITIONER

## 2020-01-01 PROCEDURE — 83880 ASSAY OF NATRIURETIC PEPTIDE: CPT

## 2020-01-01 PROCEDURE — G8427 DOCREV CUR MEDS BY ELIG CLIN: HCPCS | Performed by: NURSE PRACTITIONER

## 2020-01-01 PROCEDURE — G8417 CALC BMI ABV UP PARAM F/U: HCPCS | Performed by: FAMILY MEDICINE

## 2020-01-01 PROCEDURE — 99212 OFFICE O/P EST SF 10 MIN: CPT | Performed by: PHYSICIAN ASSISTANT

## 2020-01-01 PROCEDURE — 81001 URINALYSIS AUTO W/SCOPE: CPT

## 2020-01-01 PROCEDURE — 4040F PNEUMOC VAC/ADMIN/RCVD: CPT | Performed by: FAMILY MEDICINE

## 2020-01-01 PROCEDURE — 0W993ZZ DRAINAGE OF RIGHT PLEURAL CAVITY, PERCUTANEOUS APPROACH: ICD-10-PCS | Performed by: INTERNAL MEDICINE

## 2020-01-01 PROCEDURE — 94150 VITAL CAPACITY TEST: CPT

## 2020-01-01 PROCEDURE — 99214 OFFICE O/P EST MOD 30 MIN: CPT

## 2020-01-01 PROCEDURE — 99214 OFFICE O/P EST MOD 30 MIN: CPT | Performed by: NURSE PRACTITIONER

## 2020-01-01 PROCEDURE — 88112 CYTOPATH CELL ENHANCE TECH: CPT

## 2020-01-01 PROCEDURE — 99233 SBSQ HOSP IP/OBS HIGH 50: CPT | Performed by: INTERNAL MEDICINE

## 2020-01-01 PROCEDURE — 87015 SPECIMEN INFECT AGNT CONCNTJ: CPT

## 2020-01-01 PROCEDURE — 6360000004 HC RX CONTRAST MEDICATION: Performed by: ORTHOPAEDIC SURGERY

## 2020-01-01 PROCEDURE — 73562 X-RAY EXAM OF KNEE 3: CPT

## 2020-01-01 PROCEDURE — 99285 EMERGENCY DEPT VISIT HI MDM: CPT

## 2020-01-01 PROCEDURE — G8484 FLU IMMUNIZE NO ADMIN: HCPCS | Performed by: NURSE PRACTITIONER

## 2020-01-01 PROCEDURE — 87206 SMEAR FLUORESCENT/ACID STAI: CPT

## 2020-01-01 PROCEDURE — 85730 THROMBOPLASTIN TIME PARTIAL: CPT

## 2020-01-01 PROCEDURE — 1123F ACP DISCUSS/DSCN MKR DOCD: CPT | Performed by: PHYSICIAN ASSISTANT

## 2020-01-01 PROCEDURE — 87116 MYCOBACTERIA CULTURE: CPT

## 2020-01-01 PROCEDURE — 1123F ACP DISCUSS/DSCN MKR DOCD: CPT | Performed by: INTERNAL MEDICINE

## 2020-01-01 PROCEDURE — 2709999900 CT CHEST PULMONARY EMBOLISM W CONTRAST

## 2020-01-01 PROCEDURE — 84484 ASSAY OF TROPONIN QUANT: CPT

## 2020-01-01 PROCEDURE — 94761 N-INVAS EAR/PLS OXIMETRY MLT: CPT

## 2020-01-01 PROCEDURE — 4040F PNEUMOC VAC/ADMIN/RCVD: CPT | Performed by: PHYSICIAN ASSISTANT

## 2020-01-01 PROCEDURE — 6360000002 HC RX W HCPCS: Performed by: EMERGENCY MEDICINE

## 2020-01-01 PROCEDURE — 6370000000 HC RX 637 (ALT 250 FOR IP): Performed by: PHYSICIAN ASSISTANT

## 2020-01-01 PROCEDURE — 1123F ACP DISCUSS/DSCN MKR DOCD: CPT | Performed by: FAMILY MEDICINE

## 2020-01-01 PROCEDURE — 99214 OFFICE O/P EST MOD 30 MIN: CPT | Performed by: FAMILY MEDICINE

## 2020-01-01 PROCEDURE — 1111F DSCHRG MED/CURRENT MED MERGE: CPT | Performed by: FAMILY MEDICINE

## 2020-01-01 PROCEDURE — 87075 CULTR BACTERIA EXCEPT BLOOD: CPT

## 2020-01-01 PROCEDURE — 83605 ASSAY OF LACTIC ACID: CPT

## 2020-01-01 PROCEDURE — 73030 X-RAY EXAM OF SHOULDER: CPT

## 2020-01-01 PROCEDURE — 89060 EXAM SYNOVIAL FLUID CRYSTALS: CPT

## 2020-01-01 PROCEDURE — 99238 HOSP IP/OBS DSCHRG MGMT 30/<: CPT | Performed by: INTERNAL MEDICINE

## 2020-01-01 PROCEDURE — G8427 DOCREV CUR MEDS BY ELIG CLIN: HCPCS | Performed by: PHYSICIAN ASSISTANT

## 2020-01-01 PROCEDURE — G8484 FLU IMMUNIZE NO ADMIN: HCPCS | Performed by: PHYSICIAN ASSISTANT

## 2020-01-01 PROCEDURE — 97161 PT EVAL LOW COMPLEX 20 MIN: CPT | Performed by: PHYSICAL THERAPIST

## 2020-01-01 PROCEDURE — 99211 OFF/OP EST MAY X REQ PHY/QHP: CPT

## 2020-01-01 PROCEDURE — G0446 INTENS BEHAVE THER CARDIO DX: HCPCS | Performed by: FAMILY MEDICINE

## 2020-01-01 PROCEDURE — 99284 EMERGENCY DEPT VISIT MOD MDM: CPT

## 2020-01-01 PROCEDURE — 82945 GLUCOSE OTHER FLUID: CPT

## 2020-01-01 PROCEDURE — 99232 SBSQ HOSP IP/OBS MODERATE 35: CPT | Performed by: INTERNAL MEDICINE

## 2020-01-01 PROCEDURE — G8484 FLU IMMUNIZE NO ADMIN: HCPCS | Performed by: INTERNAL MEDICINE

## 2020-01-01 PROCEDURE — 85610 PROTHROMBIN TIME: CPT

## 2020-01-01 PROCEDURE — G0439 PPPS, SUBSEQ VISIT: HCPCS | Performed by: FAMILY MEDICINE

## 2020-01-01 PROCEDURE — 99223 1ST HOSP IP/OBS HIGH 75: CPT | Performed by: INTERNAL MEDICINE

## 2020-01-01 PROCEDURE — 97165 OT EVAL LOW COMPLEX 30 MIN: CPT | Performed by: OCCUPATIONAL THERAPIST

## 2020-01-01 PROCEDURE — 97165 OT EVAL LOW COMPLEX 30 MIN: CPT

## 2020-01-01 PROCEDURE — 2709999900 US THORACENTESIS

## 2020-01-01 PROCEDURE — 80061 LIPID PANEL: CPT

## 2020-01-01 PROCEDURE — 87070 CULTURE OTHR SPECIMN AEROBIC: CPT

## 2020-01-01 PROCEDURE — U0002 COVID-19 LAB TEST NON-CDC: HCPCS

## 2020-01-01 PROCEDURE — 83874 ASSAY OF MYOGLOBIN: CPT

## 2020-01-01 PROCEDURE — 83615 LACTATE (LD) (LDH) ENZYME: CPT

## 2020-01-01 PROCEDURE — 88305 TISSUE EXAM BY PATHOLOGIST: CPT

## 2020-01-01 PROCEDURE — G8417 CALC BMI ABV UP PARAM F/U: HCPCS | Performed by: NURSE PRACTITIONER

## 2020-01-01 PROCEDURE — 3051F HG A1C>EQUAL 7.0%<8.0%: CPT | Performed by: FAMILY MEDICINE

## 2020-01-01 PROCEDURE — 4040F PNEUMOC VAC/ADMIN/RCVD: CPT | Performed by: INTERNAL MEDICINE

## 2020-01-01 PROCEDURE — 99203 OFFICE O/P NEW LOW 30 MIN: CPT | Performed by: NURSE PRACTITIONER

## 2020-01-01 PROCEDURE — 99999 PR OFFICE/OUTPT VISIT,PROCEDURE ONLY: CPT | Performed by: FAMILY MEDICINE

## 2020-01-01 PROCEDURE — 83986 ASSAY PH BODY FLUID NOS: CPT

## 2020-01-01 PROCEDURE — 99213 OFFICE O/P EST LOW 20 MIN: CPT | Performed by: FAMILY MEDICINE

## 2020-01-01 PROCEDURE — 96360 HYDRATION IV INFUSION INIT: CPT

## 2020-01-01 PROCEDURE — 88185 FLOWCYTOMETRY/TC ADD-ON: CPT

## 2020-01-01 PROCEDURE — G8417 CALC BMI ABV UP PARAM F/U: HCPCS | Performed by: INTERNAL MEDICINE

## 2020-01-01 PROCEDURE — 70450 CT HEAD/BRAIN W/O DYE: CPT

## 2020-01-01 PROCEDURE — C1729 CATH, DRAINAGE: HCPCS

## 2020-01-01 PROCEDURE — 20610 DRAIN/INJ JOINT/BURSA W/O US: CPT

## 2020-01-01 PROCEDURE — 80053 COMPREHEN METABOLIC PANEL: CPT

## 2020-01-01 PROCEDURE — 1036F TOBACCO NON-USER: CPT | Performed by: PHYSICIAN ASSISTANT

## 2020-01-01 PROCEDURE — 87040 BLOOD CULTURE FOR BACTERIA: CPT

## 2020-01-01 PROCEDURE — 84443 ASSAY THYROID STIM HORMONE: CPT

## 2020-01-01 PROCEDURE — 1036F TOBACCO NON-USER: CPT | Performed by: FAMILY MEDICINE

## 2020-01-01 PROCEDURE — G8427 DOCREV CUR MEDS BY ELIG CLIN: HCPCS | Performed by: INTERNAL MEDICINE

## 2020-01-01 PROCEDURE — 83735 ASSAY OF MAGNESIUM: CPT

## 2020-01-01 PROCEDURE — 93306 TTE W/DOPPLER COMPLETE: CPT

## 2020-01-01 PROCEDURE — 99214 OFFICE O/P EST MOD 30 MIN: CPT | Performed by: INTERNAL MEDICINE

## 2020-01-01 PROCEDURE — 73700 CT LOWER EXTREMITY W/O DYE: CPT

## 2020-01-01 PROCEDURE — G8417 CALC BMI ABV UP PARAM F/U: HCPCS | Performed by: PHYSICIAN ASSISTANT

## 2020-01-01 PROCEDURE — 87102 FUNGUS ISOLATION CULTURE: CPT

## 2020-01-01 PROCEDURE — 1123F ACP DISCUSS/DSCN MKR DOCD: CPT | Performed by: NURSE PRACTITIONER

## 2020-01-01 PROCEDURE — 83036 HEMOGLOBIN GLYCOSYLATED A1C: CPT

## 2020-01-01 PROCEDURE — 77002 NEEDLE LOCALIZATION BY XRAY: CPT

## 2020-01-01 PROCEDURE — 84157 ASSAY OF PROTEIN OTHER: CPT

## 2020-01-01 PROCEDURE — 83550 IRON BINDING TEST: CPT

## 2020-01-01 PROCEDURE — 83540 ASSAY OF IRON: CPT

## 2020-01-01 PROCEDURE — 1111F DSCHRG MED/CURRENT MED MERGE: CPT | Performed by: NURSE PRACTITIONER

## 2020-01-01 PROCEDURE — 99495 TRANSJ CARE MGMT MOD F2F 14D: CPT | Performed by: FAMILY MEDICINE

## 2020-01-01 PROCEDURE — 99223 1ST HOSP IP/OBS HIGH 75: CPT | Performed by: PHYSICIAN ASSISTANT

## 2020-01-01 PROCEDURE — 0S993ZX DRAINAGE OF RIGHT HIP JOINT, PERCUTANEOUS APPROACH, DIAGNOSTIC: ICD-10-PCS | Performed by: RADIOLOGY

## 2020-01-01 PROCEDURE — 1036F TOBACCO NON-USER: CPT | Performed by: INTERNAL MEDICINE

## 2020-01-01 PROCEDURE — 99214 OFFICE O/P EST MOD 30 MIN: CPT | Performed by: PHYSICIAN ASSISTANT

## 2020-01-01 PROCEDURE — 97161 PT EVAL LOW COMPLEX 20 MIN: CPT

## 2020-01-01 PROCEDURE — 2580000003 HC RX 258: Performed by: EMERGENCY MEDICINE

## 2020-01-01 PROCEDURE — 88184 FLOWCYTOMETRY/ TC 1 MARKER: CPT

## 2020-01-01 PROCEDURE — APPSS45 APP SPLIT SHARED TIME 31-45 MINUTES: Performed by: NURSE PRACTITIONER

## 2020-01-01 PROCEDURE — 1111F DSCHRG MED/CURRENT MED MERGE: CPT | Performed by: PHYSICIAN ASSISTANT

## 2020-01-01 PROCEDURE — 1250000000 HC SEMI PRIVATE HOSPICE R&B

## 2020-01-01 PROCEDURE — 85379 FIBRIN DEGRADATION QUANT: CPT

## 2020-01-01 RX ORDER — ACETAMINOPHEN 325 MG/1
650 TABLET ORAL EVERY 4 HOURS PRN
Status: DISCONTINUED | OUTPATIENT
Start: 2020-01-01 | End: 2020-01-01 | Stop reason: HOSPADM

## 2020-01-01 RX ORDER — MORPHINE SULFATE 10 MG/ML
6 INJECTION, SOLUTION INTRAMUSCULAR; INTRAVENOUS
Status: DISCONTINUED | OUTPATIENT
Start: 2020-01-01 | End: 2020-01-01 | Stop reason: HOSPADM

## 2020-01-01 RX ORDER — CARVEDILOL 3.12 MG/1
3.12 TABLET ORAL 2 TIMES DAILY WITH MEALS
Status: DISCONTINUED | OUTPATIENT
Start: 2020-01-01 | End: 2020-01-01

## 2020-01-01 RX ORDER — FUROSEMIDE 10 MG/ML
40 INJECTION INTRAMUSCULAR; INTRAVENOUS 2 TIMES DAILY
Status: DISCONTINUED | OUTPATIENT
Start: 2020-01-01 | End: 2020-01-01

## 2020-01-01 RX ORDER — FUROSEMIDE 10 MG/ML
40 INJECTION INTRAMUSCULAR; INTRAVENOUS ONCE
Status: COMPLETED | OUTPATIENT
Start: 2020-01-01 | End: 2020-01-01

## 2020-01-01 RX ORDER — LEVOTHYROXINE SODIUM 0.03 MG/1
25 TABLET ORAL DAILY
Status: DISCONTINUED | OUTPATIENT
Start: 2020-01-01 | End: 2020-01-01 | Stop reason: HOSPADM

## 2020-01-01 RX ORDER — POTASSIUM CHLORIDE 20 MEQ/1
20 TABLET, EXTENDED RELEASE ORAL 2 TIMES DAILY
Status: DISCONTINUED | OUTPATIENT
Start: 2020-01-01 | End: 2020-01-01

## 2020-01-01 RX ORDER — BUMETANIDE 0.25 MG/ML
2 INJECTION, SOLUTION INTRAMUSCULAR; INTRAVENOUS 2 TIMES DAILY
Status: DISCONTINUED | OUTPATIENT
Start: 2020-01-01 | End: 2020-01-01 | Stop reason: HOSPADM

## 2020-01-01 RX ORDER — SODIUM CHLORIDE FOR INHALATION 0.9 %
3 VIAL, NEBULIZER (ML) INHALATION
Status: DISCONTINUED | OUTPATIENT
Start: 2020-01-01 | End: 2020-01-01 | Stop reason: HOSPADM

## 2020-01-01 RX ORDER — DEXTROSE MONOHYDRATE 25 G/50ML
INJECTION, SOLUTION INTRAVENOUS
Status: COMPLETED
Start: 2020-01-01 | End: 2020-01-01

## 2020-01-01 RX ORDER — HYDROCODONE BITARTRATE AND ACETAMINOPHEN 5; 325 MG/1; MG/1
2 TABLET ORAL EVERY 6 HOURS PRN
Status: DISCONTINUED | OUTPATIENT
Start: 2020-01-01 | End: 2020-01-01 | Stop reason: HOSPADM

## 2020-01-01 RX ORDER — NICOTINE POLACRILEX 4 MG
15 LOZENGE BUCCAL PRN
Status: DISCONTINUED | OUTPATIENT
Start: 2020-01-01 | End: 2020-01-01 | Stop reason: HOSPADM

## 2020-01-01 RX ORDER — METOLAZONE 2.5 MG/1
2.5 TABLET ORAL DAILY
Status: DISCONTINUED | OUTPATIENT
Start: 2020-01-01 | End: 2020-01-01

## 2020-01-01 RX ORDER — POTASSIUM CHLORIDE 20 MEQ/1
40 TABLET, EXTENDED RELEASE ORAL ONCE
Status: COMPLETED | OUTPATIENT
Start: 2020-01-01 | End: 2020-01-01

## 2020-01-01 RX ORDER — DOBUTAMINE HYDROCHLORIDE 200 MG/100ML
5 INJECTION INTRAVENOUS CONTINUOUS
Status: DISCONTINUED | OUTPATIENT
Start: 2020-01-01 | End: 2020-01-01

## 2020-01-01 RX ORDER — ASPIRIN 81 MG/1
81 TABLET ORAL DAILY
Status: DISCONTINUED | OUTPATIENT
Start: 2020-01-01 | End: 2020-01-01 | Stop reason: HOSPADM

## 2020-01-01 RX ORDER — PHENOL 1.4 %
1 AEROSOL, SPRAY (ML) MUCOUS MEMBRANE DAILY
Status: DISCONTINUED | OUTPATIENT
Start: 2020-01-01 | End: 2020-01-01 | Stop reason: CLARIF

## 2020-01-01 RX ORDER — NICOTINE 21 MG/24HR
1 PATCH, TRANSDERMAL 24 HOURS TRANSDERMAL DAILY PRN
Status: DISCONTINUED | OUTPATIENT
Start: 2020-01-01 | End: 2020-01-01 | Stop reason: HOSPADM

## 2020-01-01 RX ORDER — CALCIUM CARBONATE 500(1250)
500 TABLET ORAL DAILY
Status: DISCONTINUED | OUTPATIENT
Start: 2020-01-01 | End: 2020-01-01 | Stop reason: HOSPADM

## 2020-01-01 RX ORDER — METOPROLOL SUCCINATE 100 MG/1
100 TABLET, EXTENDED RELEASE ORAL DAILY
Qty: 30 TABLET | Refills: 0 | Status: SHIPPED | OUTPATIENT
Start: 2020-01-01

## 2020-01-01 RX ORDER — ALBUTEROL SULFATE 2.5 MG/3ML
2.5 SOLUTION RESPIRATORY (INHALATION)
Status: DISCONTINUED | OUTPATIENT
Start: 2020-01-01 | End: 2020-01-01

## 2020-01-01 RX ORDER — DIGOXIN 250 MCG
250 TABLET ORAL DAILY
Status: ON HOLD | COMMUNITY
End: 2020-01-01 | Stop reason: HOSPADM

## 2020-01-01 RX ORDER — BUPROPION HYDROCHLORIDE 150 MG/1
300 TABLET ORAL EVERY MORNING
Status: DISCONTINUED | OUTPATIENT
Start: 2020-01-01 | End: 2020-01-01 | Stop reason: HOSPADM

## 2020-01-01 RX ORDER — GABAPENTIN 600 MG/1
600 TABLET ORAL 2 TIMES DAILY
Status: DISCONTINUED | OUTPATIENT
Start: 2020-01-01 | End: 2020-01-01 | Stop reason: HOSPADM

## 2020-01-01 RX ORDER — SODIUM CHLORIDE 0.9 % (FLUSH) 0.9 %
10 SYRINGE (ML) INJECTION PRN
Status: DISCONTINUED | OUTPATIENT
Start: 2020-01-01 | End: 2020-01-01 | Stop reason: HOSPADM

## 2020-01-01 RX ORDER — DESLORATADINE 5 MG/1
5 TABLET ORAL DAILY
Status: DISCONTINUED | OUTPATIENT
Start: 2020-01-01 | End: 2020-01-01 | Stop reason: CLARIF

## 2020-01-01 RX ORDER — SODIUM CHLORIDE 9 MG/ML
INJECTION, SOLUTION INTRAVENOUS CONTINUOUS
Status: DISCONTINUED | OUTPATIENT
Start: 2020-01-01 | End: 2020-01-01

## 2020-01-01 RX ORDER — FUROSEMIDE 40 MG/1
40 TABLET ORAL 2 TIMES DAILY
Qty: 180 TABLET | Refills: 1 | Status: ON HOLD | OUTPATIENT
Start: 2020-01-01 | End: 2020-01-01 | Stop reason: HOSPADM

## 2020-01-01 RX ORDER — NICOTINE 21 MG/24HR
1 PATCH, TRANSDERMAL 24 HOURS TRANSDERMAL DAILY PRN
Status: DISCONTINUED | OUTPATIENT
Start: 2020-01-01 | End: 2020-01-01

## 2020-01-01 RX ORDER — FUROSEMIDE 10 MG/ML
20 INJECTION INTRAMUSCULAR; INTRAVENOUS ONCE
Status: DISCONTINUED | OUTPATIENT
Start: 2020-01-01 | End: 2020-01-01 | Stop reason: HOSPADM

## 2020-01-01 RX ORDER — CARVEDILOL 3.12 MG/1
3.12 TABLET ORAL 2 TIMES DAILY WITH MEALS
Status: DISCONTINUED | OUTPATIENT
Start: 2020-01-01 | End: 2020-01-01 | Stop reason: HOSPADM

## 2020-01-01 RX ORDER — PHENOL 1.4 %
1 AEROSOL, SPRAY (ML) MUCOUS MEMBRANE DAILY
Status: DISCONTINUED | OUTPATIENT
Start: 2020-01-01 | End: 2020-01-01 | Stop reason: HOSPADM

## 2020-01-01 RX ORDER — CETIRIZINE HYDROCHLORIDE 5 MG/1
5 TABLET ORAL DAILY
Status: DISCONTINUED | OUTPATIENT
Start: 2020-01-01 | End: 2020-01-01 | Stop reason: HOSPADM

## 2020-01-01 RX ORDER — BUMETANIDE 2 MG/1
2 TABLET ORAL 2 TIMES DAILY
Qty: 30 TABLET | Refills: 0 | Status: SHIPPED | OUTPATIENT
Start: 2020-01-01

## 2020-01-01 RX ORDER — PAROXETINE HYDROCHLORIDE 20 MG/1
40 TABLET, FILM COATED ORAL DAILY
Status: DISCONTINUED | OUTPATIENT
Start: 2020-01-01 | End: 2020-01-01 | Stop reason: HOSPADM

## 2020-01-01 RX ORDER — FUROSEMIDE 40 MG/1
40 TABLET ORAL 2 TIMES DAILY
Qty: 180 TABLET | Refills: 0 | Status: SHIPPED | OUTPATIENT
Start: 2020-01-01 | End: 2020-01-01 | Stop reason: SDUPTHER

## 2020-01-01 RX ORDER — LORAZEPAM 2 MG/ML
1 INJECTION INTRAMUSCULAR
Status: DISCONTINUED | OUTPATIENT
Start: 2020-01-01 | End: 2020-01-01 | Stop reason: HOSPADM

## 2020-01-01 RX ORDER — LANOLIN ALCOHOL/MO/W.PET/CERES
5000 CREAM (GRAM) TOPICAL DAILY
Status: DISCONTINUED | OUTPATIENT
Start: 2020-01-01 | End: 2020-01-01 | Stop reason: HOSPADM

## 2020-01-01 RX ORDER — DEXTROSE MONOHYDRATE 25 G/50ML
12.5 INJECTION, SOLUTION INTRAVENOUS PRN
Status: DISCONTINUED | OUTPATIENT
Start: 2020-01-01 | End: 2020-01-01 | Stop reason: HOSPADM

## 2020-01-01 RX ORDER — POTASSIUM CHLORIDE 1.5 G/1.77G
20 POWDER, FOR SOLUTION ORAL 2 TIMES DAILY
COMMUNITY
End: 2020-01-01

## 2020-01-01 RX ORDER — CARVEDILOL 3.12 MG/1
3.12 TABLET ORAL 2 TIMES DAILY WITH MEALS
Qty: 180 TABLET | Refills: 1 | Status: ON HOLD | OUTPATIENT
Start: 2020-01-01 | End: 2020-01-01 | Stop reason: HOSPADM

## 2020-01-01 RX ORDER — CARVEDILOL 3.12 MG/1
3.12 TABLET ORAL 2 TIMES DAILY WITH MEALS
Qty: 60 TABLET | Refills: 5 | Status: SHIPPED | OUTPATIENT
Start: 2020-01-01 | End: 2020-01-01 | Stop reason: SDUPTHER

## 2020-01-01 RX ORDER — METOLAZONE 2.5 MG/1
TABLET ORAL
Qty: 10 TABLET | Refills: 0 | Status: ON HOLD | OUTPATIENT
Start: 2020-01-01 | End: 2020-01-01 | Stop reason: SDUPTHER

## 2020-01-01 RX ORDER — LORAZEPAM 2 MG/ML
3 INJECTION INTRAMUSCULAR
Status: DISCONTINUED | OUTPATIENT
Start: 2020-01-01 | End: 2020-01-01 | Stop reason: HOSPADM

## 2020-01-01 RX ORDER — PANTOPRAZOLE SODIUM 40 MG/1
40 TABLET, DELAYED RELEASE ORAL DAILY
Status: DISCONTINUED | OUTPATIENT
Start: 2020-01-01 | End: 2020-01-01 | Stop reason: HOSPADM

## 2020-01-01 RX ORDER — ACETAMINOPHEN 325 MG/1
650 TABLET ORAL EVERY 6 HOURS PRN
Status: DISCONTINUED | OUTPATIENT
Start: 2020-01-01 | End: 2020-01-01 | Stop reason: HOSPADM

## 2020-01-01 RX ORDER — GLIMEPIRIDE 1 MG/1
1 TABLET ORAL EVERY MORNING
Qty: 90 TABLET | Refills: 1 | Status: SHIPPED | OUTPATIENT
Start: 2020-01-01

## 2020-01-01 RX ORDER — ONDANSETRON 2 MG/ML
4 INJECTION INTRAMUSCULAR; INTRAVENOUS EVERY 6 HOURS PRN
Status: DISCONTINUED | OUTPATIENT
Start: 2020-01-01 | End: 2020-01-01 | Stop reason: HOSPADM

## 2020-01-01 RX ORDER — CARVEDILOL 3.12 MG/1
3.12 TABLET ORAL 2 TIMES DAILY WITH MEALS
Qty: 60 TABLET | Refills: 0 | Status: SHIPPED | OUTPATIENT
Start: 2020-01-01 | End: 2020-01-01 | Stop reason: SDUPTHER

## 2020-01-01 RX ORDER — POLYETHYLENE GLYCOL 3350 17 G/17G
17 POWDER, FOR SOLUTION ORAL DAILY PRN
Status: DISCONTINUED | OUTPATIENT
Start: 2020-01-01 | End: 2020-01-01

## 2020-01-01 RX ORDER — BISACODYL 10 MG
10 SUPPOSITORY, RECTAL RECTAL DAILY PRN
Status: DISCONTINUED | OUTPATIENT
Start: 2020-01-01 | End: 2020-01-01 | Stop reason: HOSPADM

## 2020-01-01 RX ORDER — HYDROCODONE BITARTRATE AND ACETAMINOPHEN 5; 325 MG/1; MG/1
1 TABLET ORAL EVERY 4 HOURS PRN
Qty: 60 TABLET | Refills: 0 | Status: SHIPPED | OUTPATIENT
Start: 2020-01-01 | End: 2020-01-01

## 2020-01-01 RX ORDER — FUROSEMIDE 40 MG/1
40 TABLET ORAL 2 TIMES DAILY
Qty: 60 TABLET | Refills: 0 | Status: SHIPPED | OUTPATIENT
Start: 2020-01-01 | End: 2020-01-01 | Stop reason: SDUPTHER

## 2020-01-01 RX ORDER — MORPHINE SULFATE 2 MG/ML
2 INJECTION, SOLUTION INTRAMUSCULAR; INTRAVENOUS
Status: DISCONTINUED | OUTPATIENT
Start: 2020-01-01 | End: 2020-01-01 | Stop reason: HOSPADM

## 2020-01-01 RX ORDER — ONDANSETRON 2 MG/ML
4 INJECTION INTRAMUSCULAR; INTRAVENOUS EVERY 4 HOURS PRN
Status: DISCONTINUED | OUTPATIENT
Start: 2020-01-01 | End: 2020-01-01 | Stop reason: HOSPADM

## 2020-01-01 RX ORDER — HEPARIN SODIUM 5000 [USP'U]/ML
5000 INJECTION, SOLUTION INTRAVENOUS; SUBCUTANEOUS EVERY 8 HOURS SCHEDULED
Status: DISCONTINUED | OUTPATIENT
Start: 2020-01-01 | End: 2020-01-01 | Stop reason: HOSPADM

## 2020-01-01 RX ORDER — MULTIVITAMIN WITH FOLIC ACID 400 MCG
1 TABLET ORAL DAILY
Status: DISCONTINUED | OUTPATIENT
Start: 2020-01-01 | End: 2020-01-01 | Stop reason: HOSPADM

## 2020-01-01 RX ORDER — CARVEDILOL 3.12 MG/1
3.12 TABLET ORAL 2 TIMES DAILY WITH MEALS
Status: COMPLETED | OUTPATIENT
Start: 2020-01-01 | End: 2020-01-01

## 2020-01-01 RX ORDER — GABAPENTIN 600 MG/1
600 TABLET ORAL 2 TIMES DAILY
Qty: 180 TABLET | Refills: 0 | Status: SHIPPED | OUTPATIENT
Start: 2020-01-01 | End: 2020-01-01

## 2020-01-01 RX ORDER — DICYCLOMINE HYDROCHLORIDE 10 MG/1
10 CAPSULE ORAL 4 TIMES DAILY PRN
Status: DISCONTINUED | OUTPATIENT
Start: 2020-01-01 | End: 2020-01-01 | Stop reason: HOSPADM

## 2020-01-01 RX ORDER — LORATADINE 10 MG/1
TABLET ORAL
Qty: 30 TABLET | Refills: 0 | Status: SHIPPED | OUTPATIENT
Start: 2020-01-01 | End: 2020-01-01

## 2020-01-01 RX ORDER — CYANOCOBALAMIN (VITAMIN B-12) 5000 MCG
1 TABLET,DISINTEGRATING ORAL DAILY
Status: DISCONTINUED | OUTPATIENT
Start: 2020-01-01 | End: 2020-01-01 | Stop reason: HOSPADM

## 2020-01-01 RX ORDER — METOLAZONE 2.5 MG/1
TABLET ORAL
Qty: 10 TABLET | Refills: 0
Start: 2020-01-01

## 2020-01-01 RX ORDER — GABAPENTIN 600 MG/1
600 TABLET ORAL 2 TIMES DAILY
Qty: 180 TABLET | Refills: 0 | Status: SHIPPED | OUTPATIENT
Start: 2020-01-01 | End: 2020-01-01 | Stop reason: SDUPTHER

## 2020-01-01 RX ORDER — ACETAMINOPHEN 650 MG/1
650 SUPPOSITORY RECTAL EVERY 6 HOURS PRN
Status: DISCONTINUED | OUTPATIENT
Start: 2020-01-01 | End: 2020-01-01

## 2020-01-01 RX ORDER — POTASSIUM CHLORIDE 20 MEQ/1
20 TABLET, EXTENDED RELEASE ORAL 2 TIMES DAILY
Status: DISCONTINUED | OUTPATIENT
Start: 2020-01-01 | End: 2020-01-01 | Stop reason: HOSPADM

## 2020-01-01 RX ORDER — GABAPENTIN 600 MG/1
TABLET ORAL
Qty: 180 TABLET | Refills: 0 | Status: SHIPPED | OUTPATIENT
Start: 2020-01-01 | End: 2021-01-05

## 2020-01-01 RX ORDER — LEVOTHYROXINE SODIUM 0.03 MG/1
25 TABLET ORAL DAILY
Qty: 30 TABLET | Refills: 0 | Status: SHIPPED | OUTPATIENT
Start: 2020-01-01

## 2020-01-01 RX ORDER — MORPHINE SULFATE 4 MG/ML
4 INJECTION, SOLUTION INTRAMUSCULAR; INTRAVENOUS
Status: DISCONTINUED | OUTPATIENT
Start: 2020-01-01 | End: 2020-01-01 | Stop reason: HOSPADM

## 2020-01-01 RX ORDER — GABAPENTIN 600 MG/1
600 TABLET ORAL 2 TIMES DAILY
Qty: 60 TABLET | Refills: 0 | Status: SHIPPED | OUTPATIENT
Start: 2020-01-01 | End: 2020-01-01

## 2020-01-01 RX ORDER — METOPROLOL SUCCINATE 50 MG/1
100 TABLET, EXTENDED RELEASE ORAL DAILY
Status: DISCONTINUED | OUTPATIENT
Start: 2020-01-01 | End: 2020-01-01

## 2020-01-01 RX ORDER — DEXTROSE MONOHYDRATE 50 MG/ML
100 INJECTION, SOLUTION INTRAVENOUS PRN
Status: DISCONTINUED | OUTPATIENT
Start: 2020-01-01 | End: 2020-01-01 | Stop reason: HOSPADM

## 2020-01-01 RX ORDER — POTASSIUM CHLORIDE 1.5 G/1.77G
20 POWDER, FOR SOLUTION ORAL DAILY
COMMUNITY
End: 2020-01-01

## 2020-01-01 RX ORDER — ALBUTEROL SULFATE 2.5 MG/3ML
2.5 SOLUTION RESPIRATORY (INHALATION)
Status: DISCONTINUED | OUTPATIENT
Start: 2020-01-01 | End: 2020-01-01 | Stop reason: HOSPADM

## 2020-01-01 RX ORDER — LORAZEPAM 2 MG/ML
2 INJECTION INTRAMUSCULAR
Status: DISCONTINUED | OUTPATIENT
Start: 2020-01-01 | End: 2020-01-01 | Stop reason: HOSPADM

## 2020-01-01 RX ORDER — GLYCOPYRROLATE 0.2 MG/ML
0.2 INJECTION INTRAMUSCULAR; INTRAVENOUS EVERY 4 HOURS PRN
Status: DISCONTINUED | OUTPATIENT
Start: 2020-01-01 | End: 2020-01-01 | Stop reason: HOSPADM

## 2020-01-01 RX ORDER — SODIUM CHLORIDE 0.9 % (FLUSH) 0.9 %
10 SYRINGE (ML) INJECTION EVERY 12 HOURS SCHEDULED
Status: DISCONTINUED | OUTPATIENT
Start: 2020-01-01 | End: 2020-01-01 | Stop reason: HOSPADM

## 2020-01-01 RX ORDER — 0.9 % SODIUM CHLORIDE 0.9 %
500 INTRAVENOUS SOLUTION INTRAVENOUS ONCE
Status: COMPLETED | OUTPATIENT
Start: 2020-01-01 | End: 2020-01-01

## 2020-01-01 RX ORDER — HYDROCODONE BITARTRATE AND ACETAMINOPHEN 5; 325 MG/1; MG/1
1 TABLET ORAL EVERY 8 HOURS PRN
Status: DISCONTINUED | OUTPATIENT
Start: 2020-01-01 | End: 2020-01-01 | Stop reason: HOSPADM

## 2020-01-01 RX ORDER — POTASSIUM CHLORIDE 20 MEQ/1
20 TABLET, EXTENDED RELEASE ORAL 2 TIMES DAILY
Qty: 180 TABLET | Refills: 1 | Status: SHIPPED | OUTPATIENT
Start: 2020-01-01

## 2020-01-01 RX ORDER — HYDROCODONE BITARTRATE AND ACETAMINOPHEN 5; 325 MG/1; MG/1
2 TABLET ORAL EVERY 12 HOURS PRN
Status: DISCONTINUED | OUTPATIENT
Start: 2020-01-01 | End: 2020-01-01 | Stop reason: ALTCHOICE

## 2020-01-01 RX ORDER — BUPROPION HYDROCHLORIDE 300 MG/1
300 TABLET ORAL EVERY MORNING
Qty: 90 TABLET | Refills: 1 | Status: SHIPPED | OUTPATIENT
Start: 2020-01-01

## 2020-01-01 RX ORDER — GLIMEPIRIDE 1 MG/1
1 TABLET ORAL EVERY MORNING
Status: DISCONTINUED | OUTPATIENT
Start: 2020-01-01 | End: 2020-01-01

## 2020-01-01 RX ORDER — CARVEDILOL 3.12 MG/1
TABLET ORAL
Qty: 180 TABLET | Refills: 0 | OUTPATIENT
Start: 2020-01-01

## 2020-01-01 RX ORDER — FUROSEMIDE 40 MG/1
40 TABLET ORAL 2 TIMES DAILY
Qty: 60 TABLET | Refills: 0 | Status: CANCELLED | OUTPATIENT
Start: 2020-01-01

## 2020-01-01 RX ORDER — PROMETHAZINE HYDROCHLORIDE 25 MG/1
12.5 TABLET ORAL EVERY 6 HOURS PRN
Status: DISCONTINUED | OUTPATIENT
Start: 2020-01-01 | End: 2020-01-01

## 2020-01-01 RX ORDER — LORATADINE 10 MG/1
10 TABLET ORAL DAILY
Qty: 90 TABLET | Refills: 1 | Status: SHIPPED | OUTPATIENT
Start: 2020-01-01 | End: 2020-01-01

## 2020-01-01 RX ORDER — 0.9 % SODIUM CHLORIDE 0.9 %
500 INTRAVENOUS SOLUTION INTRAVENOUS ONCE
Status: DISCONTINUED | OUTPATIENT
Start: 2020-01-01 | End: 2020-01-01

## 2020-01-01 RX ORDER — METOLAZONE 2.5 MG/1
2.5 TABLET ORAL DAILY
Status: DISCONTINUED | OUTPATIENT
Start: 2020-01-01 | End: 2020-01-01 | Stop reason: HOSPADM

## 2020-01-01 RX ORDER — PAROXETINE HYDROCHLORIDE 40 MG/1
TABLET, FILM COATED ORAL
Qty: 90 TABLET | Refills: 0 | Status: SHIPPED | OUTPATIENT
Start: 2020-01-01 | End: 2020-01-01 | Stop reason: SDUPTHER

## 2020-01-01 RX ORDER — 0.9 % SODIUM CHLORIDE 0.9 %
250 INTRAVENOUS SOLUTION INTRAVENOUS ONCE
Status: COMPLETED | OUTPATIENT
Start: 2020-01-01 | End: 2020-01-01

## 2020-01-01 RX ORDER — FUROSEMIDE 10 MG/ML
40 INJECTION INTRAMUSCULAR; INTRAVENOUS 2 TIMES DAILY
Status: DISCONTINUED | OUTPATIENT
Start: 2020-01-01 | End: 2020-01-01 | Stop reason: HOSPADM

## 2020-01-01 RX ORDER — MULTIVITAMIN WITH IRON
1 TABLET ORAL DAILY
Status: DISCONTINUED | OUTPATIENT
Start: 2020-01-01 | End: 2020-01-01 | Stop reason: HOSPADM

## 2020-01-01 RX ORDER — POLYETHYLENE GLYCOL 3350 17 G/17G
17 POWDER, FOR SOLUTION ORAL DAILY PRN
Status: DISCONTINUED | OUTPATIENT
Start: 2020-01-01 | End: 2020-01-01 | Stop reason: HOSPADM

## 2020-01-01 RX ORDER — METOPROLOL SUCCINATE 50 MG/1
100 TABLET, EXTENDED RELEASE ORAL DAILY
Status: DISCONTINUED | OUTPATIENT
Start: 2020-01-01 | End: 2020-01-01 | Stop reason: HOSPADM

## 2020-01-01 RX ORDER — BENZOYL PEROXIDE
KIT TOPICAL
Qty: 90 TABLET | Refills: 1 | Status: SHIPPED | OUTPATIENT
Start: 2020-01-01

## 2020-01-01 RX ORDER — DIGOXIN 250 MCG
250 TABLET ORAL DAILY
OUTPATIENT
Start: 2020-01-01

## 2020-01-01 RX ORDER — CALCIUM CARBONATE 200(500)MG
500 TABLET,CHEWABLE ORAL DAILY
Status: DISCONTINUED | OUTPATIENT
Start: 2020-01-01 | End: 2020-01-01 | Stop reason: HOSPADM

## 2020-01-01 RX ORDER — GLIMEPIRIDE 1 MG/1
1 TABLET ORAL EVERY MORNING
Status: DISCONTINUED | OUTPATIENT
Start: 2020-01-01 | End: 2020-01-01 | Stop reason: HOSPADM

## 2020-01-01 RX ORDER — ACETAMINOPHEN 650 MG/1
650 SUPPOSITORY RECTAL EVERY 6 HOURS PRN
Status: DISCONTINUED | OUTPATIENT
Start: 2020-01-01 | End: 2020-01-01 | Stop reason: HOSPADM

## 2020-01-01 RX ORDER — SODIUM POLYSTYRENE SULFONATE 15 G/60ML
15 SUSPENSION ORAL; RECTAL ONCE
Status: COMPLETED | OUTPATIENT
Start: 2020-01-01 | End: 2020-01-01

## 2020-01-01 RX ORDER — TIZANIDINE 4 MG/1
2 TABLET ORAL NIGHTLY PRN
Status: DISCONTINUED | OUTPATIENT
Start: 2020-01-01 | End: 2020-01-01 | Stop reason: HOSPADM

## 2020-01-01 RX ORDER — DIGOXIN 250 MCG
250 TABLET ORAL DAILY
Status: CANCELLED | OUTPATIENT
Start: 2020-01-01

## 2020-01-01 RX ORDER — PAROXETINE HYDROCHLORIDE 40 MG/1
TABLET, FILM COATED ORAL
Qty: 90 TABLET | Refills: 1 | Status: SHIPPED | OUTPATIENT
Start: 2020-01-01

## 2020-01-01 RX ORDER — GABAPENTIN 100 MG/1
100 CAPSULE ORAL 2 TIMES DAILY
Status: DISCONTINUED | OUTPATIENT
Start: 2020-01-01 | End: 2020-01-01 | Stop reason: HOSPADM

## 2020-01-01 RX ORDER — BUMETANIDE 0.25 MG/ML
2 INJECTION, SOLUTION INTRAMUSCULAR; INTRAVENOUS 2 TIMES DAILY
Status: DISCONTINUED | OUTPATIENT
Start: 2020-01-01 | End: 2020-01-01

## 2020-01-01 RX ORDER — DIPHENHYDRAMINE HYDROCHLORIDE 50 MG/ML
25 INJECTION INTRAMUSCULAR; INTRAVENOUS EVERY 6 HOURS PRN
Status: DISCONTINUED | OUTPATIENT
Start: 2020-01-01 | End: 2020-01-01 | Stop reason: HOSPADM

## 2020-01-01 RX ADMIN — LORAZEPAM 1 MG: 2 INJECTION INTRAMUSCULAR; INTRAVENOUS at 11:34

## 2020-01-01 RX ADMIN — SODIUM CHLORIDE, PRESERVATIVE FREE 10 ML: 5 INJECTION INTRAVENOUS at 21:05

## 2020-01-01 RX ADMIN — POTASSIUM CHLORIDE 20 MEQ: 20 TABLET, EXTENDED RELEASE ORAL at 09:10

## 2020-01-01 RX ADMIN — POTASSIUM CHLORIDE 20 MEQ: 20 TABLET, EXTENDED RELEASE ORAL at 08:10

## 2020-01-01 RX ADMIN — POTASSIUM CHLORIDE 20 MEQ: 20 TABLET, EXTENDED RELEASE ORAL at 00:46

## 2020-01-01 RX ADMIN — SODIUM CHLORIDE, PRESERVATIVE FREE 10 ML: 5 INJECTION INTRAVENOUS at 08:17

## 2020-01-01 RX ADMIN — BUMETANIDE 2 MG: 0.25 INJECTION INTRAMUSCULAR; INTRAVENOUS at 08:07

## 2020-01-01 RX ADMIN — CARVEDILOL 3.12 MG: 3.12 TABLET, FILM COATED ORAL at 17:57

## 2020-01-01 RX ADMIN — ONDANSETRON 4 MG: 2 INJECTION INTRAMUSCULAR; INTRAVENOUS at 04:23

## 2020-01-01 RX ADMIN — CYANOCOBALAMIN TAB 1000 MCG 5000 MCG: 1000 TAB at 08:14

## 2020-01-01 RX ADMIN — MORPHINE SULFATE 2 MG: 2 INJECTION, SOLUTION INTRAMUSCULAR; INTRAVENOUS at 14:00

## 2020-01-01 RX ADMIN — MORPHINE SULFATE 2 MG: 2 INJECTION, SOLUTION INTRAMUSCULAR; INTRAVENOUS at 11:33

## 2020-01-01 RX ADMIN — FUROSEMIDE 40 MG: 10 INJECTION, SOLUTION INTRAMUSCULAR; INTRAVENOUS at 09:39

## 2020-01-01 RX ADMIN — HYDROCODONE BITARTRATE AND ACETAMINOPHEN 2 TABLET: 5; 325 TABLET ORAL at 03:39

## 2020-01-01 RX ADMIN — GABAPENTIN 600 MG: 600 TABLET, FILM COATED ORAL at 01:03

## 2020-01-01 RX ADMIN — CYANOCOBALAMIN TAB 1000 MCG 5000 MCG: 1000 TAB at 09:59

## 2020-01-01 RX ADMIN — DOBUTAMINE HYDROCHLORIDE 5 MCG/KG/MIN: 200 INJECTION INTRAVENOUS at 02:09

## 2020-01-01 RX ADMIN — HYDROCODONE BITARTRATE AND ACETAMINOPHEN 2 TABLET: 5; 325 TABLET ORAL at 05:24

## 2020-01-01 RX ADMIN — GABAPENTIN 100 MG: 100 CAPSULE ORAL at 21:05

## 2020-01-01 RX ADMIN — SODIUM CHLORIDE, PRESERVATIVE FREE 10 ML: 5 INJECTION INTRAVENOUS at 20:07

## 2020-01-01 RX ADMIN — BUPROPION HYDROCHLORIDE 300 MG: 150 TABLET, EXTENDED RELEASE ORAL at 10:00

## 2020-01-01 RX ADMIN — METOPROLOL TARTRATE 25 MG: 25 TABLET, FILM COATED ORAL at 09:31

## 2020-01-01 RX ADMIN — POTASSIUM CHLORIDE 20 MEQ: 20 TABLET, EXTENDED RELEASE ORAL at 21:05

## 2020-01-01 RX ADMIN — LEVOTHYROXINE SODIUM 25 MCG: 0.03 TABLET ORAL at 05:24

## 2020-01-01 RX ADMIN — LEVOTHYROXINE SODIUM 25 MCG: 0.03 TABLET ORAL at 06:26

## 2020-01-01 RX ADMIN — CYANOCOBALAMIN TAB 1000 MCG 5000 MCG: 1000 TAB at 09:39

## 2020-01-01 RX ADMIN — ASPIRIN 81 MG: 81 TABLET, COATED ORAL at 08:15

## 2020-01-01 RX ADMIN — SODIUM CHLORIDE 500 ML: 0.9 INJECTION, SOLUTION INTRAVENOUS at 20:40

## 2020-01-01 RX ADMIN — POTASSIUM CHLORIDE 20 MEQ: 20 TABLET, EXTENDED RELEASE ORAL at 08:43

## 2020-01-01 RX ADMIN — SODIUM CHLORIDE 250 ML: 9 INJECTION, SOLUTION INTRAVENOUS at 01:57

## 2020-01-01 RX ADMIN — PAROXETINE HYDROCHLORIDE 40 MG: 20 TABLET, FILM COATED ORAL at 08:43

## 2020-01-01 RX ADMIN — SODIUM CHLORIDE, PRESERVATIVE FREE 10 ML: 5 INJECTION INTRAVENOUS at 20:46

## 2020-01-01 RX ADMIN — THERA TABS 1 TABLET: TAB at 09:39

## 2020-01-01 RX ADMIN — HEPARIN SODIUM 5000 UNITS: 5000 INJECTION INTRAVENOUS; SUBCUTANEOUS at 00:46

## 2020-01-01 RX ADMIN — THERA TABS 1 TABLET: TAB at 08:08

## 2020-01-01 RX ADMIN — HYDROCODONE BITARTRATE AND ACETAMINOPHEN 1 TABLET: 5; 325 TABLET ORAL at 20:01

## 2020-01-01 RX ADMIN — LORAZEPAM 2 MG: 2 INJECTION INTRAMUSCULAR; INTRAVENOUS at 14:09

## 2020-01-01 RX ADMIN — PANTOPRAZOLE SODIUM 40 MG: 40 TABLET, DELAYED RELEASE ORAL at 09:39

## 2020-01-01 RX ADMIN — DIPHENHYDRAMINE HYDROCHLORIDE 25 MG: 50 INJECTION, SOLUTION INTRAMUSCULAR; INTRAVENOUS at 03:00

## 2020-01-01 RX ADMIN — CARVEDILOL 3.12 MG: 3.12 TABLET, FILM COATED ORAL at 09:39

## 2020-01-01 RX ADMIN — POTASSIUM CHLORIDE 20 MEQ: 20 TABLET, EXTENDED RELEASE ORAL at 20:00

## 2020-01-01 RX ADMIN — POTASSIUM CHLORIDE 20 MEQ: 20 TABLET, EXTENDED RELEASE ORAL at 20:48

## 2020-01-01 RX ADMIN — BUPROPION HYDROCHLORIDE 300 MG: 150 TABLET, EXTENDED RELEASE ORAL at 08:08

## 2020-01-01 RX ADMIN — PANTOPRAZOLE SODIUM 40 MG: 40 TABLET, DELAYED RELEASE ORAL at 08:10

## 2020-01-01 RX ADMIN — PANTOPRAZOLE SODIUM 40 MG: 40 TABLET, DELAYED RELEASE ORAL at 08:43

## 2020-01-01 RX ADMIN — BUPROPION HYDROCHLORIDE 300 MG: 150 TABLET, EXTENDED RELEASE ORAL at 08:43

## 2020-01-01 RX ADMIN — TIZANIDINE 2 MG: 4 TABLET ORAL at 20:01

## 2020-01-01 RX ADMIN — HEPARIN SODIUM 5000 UNITS: 5000 INJECTION INTRAVENOUS; SUBCUTANEOUS at 21:05

## 2020-01-01 RX ADMIN — BUPROPION HYDROCHLORIDE 300 MG: 150 TABLET, EXTENDED RELEASE ORAL at 09:39

## 2020-01-01 RX ADMIN — CETIRIZINE HYDROCHLORIDE 5 MG: 5 TABLET, FILM COATED ORAL at 09:39

## 2020-01-01 RX ADMIN — ANTACID TABLETS 500 MG: 500 TABLET, CHEWABLE ORAL at 09:39

## 2020-01-01 RX ADMIN — CALCIUM CHLORIDE 2 G: 100 INJECTION, SOLUTION INTRAVENOUS; INTRAVENTRICULAR at 10:41

## 2020-01-01 RX ADMIN — HYDROCODONE BITARTRATE AND ACETAMINOPHEN 2 TABLET: 5; 325 TABLET ORAL at 21:36

## 2020-01-01 RX ADMIN — SODIUM CHLORIDE, PRESERVATIVE FREE 10 ML: 5 INJECTION INTRAVENOUS at 08:11

## 2020-01-01 RX ADMIN — METOLAZONE 2.5 MG: 2.5 TABLET ORAL at 08:10

## 2020-01-01 RX ADMIN — GABAPENTIN 100 MG: 100 CAPSULE ORAL at 08:43

## 2020-01-01 RX ADMIN — BUMETANIDE 2 MG: 0.25 INJECTION INTRAMUSCULAR; INTRAVENOUS at 09:09

## 2020-01-01 RX ADMIN — ACETAMINOPHEN 650 MG: 325 TABLET ORAL at 20:00

## 2020-01-01 RX ADMIN — PANTOPRAZOLE SODIUM 40 MG: 40 TABLET, DELAYED RELEASE ORAL at 09:09

## 2020-01-01 RX ADMIN — BUPROPION HYDROCHLORIDE 300 MG: 150 TABLET, EXTENDED RELEASE ORAL at 08:10

## 2020-01-01 RX ADMIN — BUMETANIDE 2 MG: 0.25 INJECTION INTRAMUSCULAR; INTRAVENOUS at 20:00

## 2020-01-01 RX ADMIN — POTASSIUM CHLORIDE 20 MEQ: 20 TABLET, EXTENDED RELEASE ORAL at 08:08

## 2020-01-01 RX ADMIN — GABAPENTIN 600 MG: 600 TABLET, FILM COATED ORAL at 08:08

## 2020-01-01 RX ADMIN — HEPARIN SODIUM 5000 UNITS: 5000 INJECTION INTRAVENOUS; SUBCUTANEOUS at 08:44

## 2020-01-01 RX ADMIN — DEXTROSE MONOHYDRATE 12.5 G: 25 INJECTION, SOLUTION INTRAVENOUS at 06:56

## 2020-01-01 RX ADMIN — DIPHENHYDRAMINE HYDROCHLORIDE 25 MG: 50 INJECTION, SOLUTION INTRAMUSCULAR; INTRAVENOUS at 21:40

## 2020-01-01 RX ADMIN — FUROSEMIDE 40 MG: 10 INJECTION, SOLUTION INTRAMUSCULAR; INTRAVENOUS at 17:57

## 2020-01-01 RX ADMIN — FUROSEMIDE 40 MG: 10 INJECTION, SOLUTION INTRAMUSCULAR; INTRAVENOUS at 08:44

## 2020-01-01 RX ADMIN — MORPHINE SULFATE 2 MG: 2 INJECTION, SOLUTION INTRAMUSCULAR; INTRAVENOUS at 12:31

## 2020-01-01 RX ADMIN — CETIRIZINE HYDROCHLORIDE 5 MG: 5 TABLET, FILM COATED ORAL at 08:07

## 2020-01-01 RX ADMIN — GABAPENTIN 600 MG: 600 TABLET, FILM COATED ORAL at 20:15

## 2020-01-01 RX ADMIN — CETIRIZINE HYDROCHLORIDE 5 MG: 5 TABLET, FILM COATED ORAL at 08:14

## 2020-01-01 RX ADMIN — BUPROPION HYDROCHLORIDE 300 MG: 150 TABLET, EXTENDED RELEASE ORAL at 08:15

## 2020-01-01 RX ADMIN — GLIMEPIRIDE 1 MG: 1 TABLET ORAL at 09:08

## 2020-01-01 RX ADMIN — CALCIUM CHLORIDE 1 G: 100 INJECTION, SOLUTION INTRAVENOUS; INTRAVENTRICULAR at 10:25

## 2020-01-01 RX ADMIN — SODIUM CHLORIDE, PRESERVATIVE FREE 10 ML: 5 INJECTION INTRAVENOUS at 09:39

## 2020-01-01 RX ADMIN — ASPIRIN 81 MG: 81 TABLET, COATED ORAL at 09:08

## 2020-01-01 RX ADMIN — LEVOTHYROXINE SODIUM 25 MCG: 0.03 TABLET ORAL at 05:47

## 2020-01-01 RX ADMIN — Medication 10 ML: at 20:06

## 2020-01-01 RX ADMIN — Medication 10 ML: at 21:05

## 2020-01-01 RX ADMIN — SODIUM CHLORIDE, PRESERVATIVE FREE 10 ML: 5 INJECTION INTRAVENOUS at 01:04

## 2020-01-01 RX ADMIN — IOPAMIDOL 80 ML: 755 INJECTION, SOLUTION INTRAVENOUS at 12:04

## 2020-01-01 RX ADMIN — Medication 10 ML: at 17:53

## 2020-01-01 RX ADMIN — Medication 10 ML: at 00:55

## 2020-01-01 RX ADMIN — PAROXETINE HYDROCHLORIDE HEMIHYDRATE 40 MG: 20 TABLET, FILM COATED ORAL at 08:15

## 2020-01-01 RX ADMIN — SODIUM CHLORIDE: 9 INJECTION, SOLUTION INTRAVENOUS at 11:25

## 2020-01-01 RX ADMIN — GABAPENTIN 600 MG: 600 TABLET, FILM COATED ORAL at 20:00

## 2020-01-01 RX ADMIN — Medication 500 MG: at 19:58

## 2020-01-01 RX ADMIN — POTASSIUM CHLORIDE 20 MEQ: 20 TABLET, EXTENDED RELEASE ORAL at 20:15

## 2020-01-01 RX ADMIN — ASPIRIN 81 MG: 81 TABLET, COATED ORAL at 08:43

## 2020-01-01 RX ADMIN — GLIMEPIRIDE 1 MG: 1 TABLET ORAL at 08:11

## 2020-01-01 RX ADMIN — Medication 500 MG: at 08:08

## 2020-01-01 RX ADMIN — GLIMEPIRIDE 1 MG: 1 TABLET ORAL at 08:08

## 2020-01-01 RX ADMIN — THERA TABS 1 TABLET: TAB at 08:10

## 2020-01-01 RX ADMIN — GABAPENTIN 600 MG: 600 TABLET, FILM COATED ORAL at 20:01

## 2020-01-01 RX ADMIN — CETIRIZINE HYDROCHLORIDE 5 MG: 5 TABLET, FILM COATED ORAL at 20:00

## 2020-01-01 RX ADMIN — HEPARIN SODIUM 5000 UNITS: 5000 INJECTION INTRAVENOUS; SUBCUTANEOUS at 06:18

## 2020-01-01 RX ADMIN — CETIRIZINE HYDROCHLORIDE 5 MG: 5 TABLET, FILM COATED ORAL at 08:11

## 2020-01-01 RX ADMIN — METOPROLOL TARTRATE 25 MG: 25 TABLET, FILM COATED ORAL at 20:15

## 2020-01-01 RX ADMIN — LEVOTHYROXINE SODIUM 25 MCG: 0.03 TABLET ORAL at 10:25

## 2020-01-01 RX ADMIN — PAROXETINE HYDROCHLORIDE 40 MG: 20 TABLET, FILM COATED ORAL at 08:08

## 2020-01-01 RX ADMIN — Medication 10 ML: at 09:09

## 2020-01-01 RX ADMIN — POTASSIUM CHLORIDE 40 MEQ: 20 TABLET, EXTENDED RELEASE ORAL at 09:08

## 2020-01-01 RX ADMIN — THERA TABS 1 TABLET: TAB at 09:58

## 2020-01-01 RX ADMIN — GABAPENTIN 600 MG: 600 TABLET, FILM COATED ORAL at 09:56

## 2020-01-01 RX ADMIN — PAROXETINE HYDROCHLORIDE 40 MG: 20 TABLET, FILM COATED ORAL at 08:09

## 2020-01-01 RX ADMIN — INSULIN LISPRO 1 UNITS: 100 INJECTION, SOLUTION INTRAVENOUS; SUBCUTANEOUS at 09:09

## 2020-01-01 RX ADMIN — Medication 500 MG: at 17:40

## 2020-01-01 RX ADMIN — SODIUM POLYSTYRENE SULFONATE 15 G: 15 SUSPENSION ORAL; RECTAL at 07:01

## 2020-01-01 RX ADMIN — METOLAZONE 2.5 MG: 2.5 TABLET ORAL at 08:08

## 2020-01-01 RX ADMIN — LORAZEPAM 2 MG: 2 INJECTION INTRAMUSCULAR; INTRAVENOUS at 12:31

## 2020-01-01 RX ADMIN — ACETAMINOPHEN 650 MG: 325 TABLET ORAL at 15:10

## 2020-01-01 RX ADMIN — PAROXETINE HYDROCHLORIDE 40 MG: 20 TABLET, FILM COATED ORAL at 19:58

## 2020-01-01 RX ADMIN — GLIMEPIRIDE 1 MG: 1 TABLET ORAL at 14:13

## 2020-01-01 RX ADMIN — CETIRIZINE HYDROCHLORIDE 5 MG: 5 TABLET, FILM COATED ORAL at 09:57

## 2020-01-01 RX ADMIN — ANTACID TABLETS 500 MG: 500 TABLET, CHEWABLE ORAL at 08:15

## 2020-01-01 RX ADMIN — PAROXETINE HYDROCHLORIDE 40 MG: 20 TABLET, FILM COATED ORAL at 09:09

## 2020-01-01 RX ADMIN — GABAPENTIN 600 MG: 600 TABLET, FILM COATED ORAL at 08:11

## 2020-01-01 RX ADMIN — GABAPENTIN 100 MG: 100 CAPSULE ORAL at 00:46

## 2020-01-01 RX ADMIN — CYANOCOBALAMIN TAB 1000 MCG 5000 MCG: 1000 TAB at 08:10

## 2020-01-01 RX ADMIN — MORPHINE SULFATE 2 MG: 2 INJECTION, SOLUTION INTRAMUSCULAR; INTRAVENOUS at 09:27

## 2020-01-01 RX ADMIN — PANTOPRAZOLE SODIUM 40 MG: 40 TABLET, DELAYED RELEASE ORAL at 09:57

## 2020-01-01 RX ADMIN — PAROXETINE HYDROCHLORIDE 40 MG: 20 TABLET, FILM COATED ORAL at 09:56

## 2020-01-01 RX ADMIN — LEVOTHYROXINE SODIUM 25 MCG: 0.03 TABLET ORAL at 05:39

## 2020-01-01 RX ADMIN — Medication 10 ML: at 08:53

## 2020-01-01 RX ADMIN — HYDROCODONE BITARTRATE AND ACETAMINOPHEN 2 TABLET: 5; 325 TABLET ORAL at 10:32

## 2020-01-01 RX ADMIN — POTASSIUM CHLORIDE 40 MEQ: 20 TABLET, EXTENDED RELEASE ORAL at 10:25

## 2020-01-01 RX ADMIN — PAROXETINE HYDROCHLORIDE HEMIHYDRATE 40 MG: 20 TABLET, FILM COATED ORAL at 09:39

## 2020-01-01 RX ADMIN — GABAPENTIN 600 MG: 600 TABLET, FILM COATED ORAL at 20:48

## 2020-01-01 RX ADMIN — METOLAZONE 2.5 MG: 2.5 TABLET ORAL at 09:08

## 2020-01-01 RX ADMIN — METOPROLOL TARTRATE 25 MG: 25 TABLET, FILM COATED ORAL at 11:33

## 2020-01-01 RX ADMIN — FUROSEMIDE 40 MG: 10 INJECTION, SOLUTION INTRAMUSCULAR; INTRAVENOUS at 08:16

## 2020-01-01 RX ADMIN — PANTOPRAZOLE SODIUM 40 MG: 40 TABLET, DELAYED RELEASE ORAL at 08:08

## 2020-01-01 RX ADMIN — SODIUM CHLORIDE, PRESERVATIVE FREE 10 ML: 5 INJECTION INTRAVENOUS at 20:48

## 2020-01-01 RX ADMIN — THERA TABS 1 TABLET: TAB at 08:15

## 2020-01-01 RX ADMIN — FUROSEMIDE 40 MG: 10 INJECTION, SOLUTION INTRAMUSCULAR; INTRAVENOUS at 16:00

## 2020-01-01 RX ADMIN — SODIUM CHLORIDE, PRESERVATIVE FREE 10 ML: 5 INJECTION INTRAVENOUS at 08:09

## 2020-01-01 RX ADMIN — CYANOCOBALAMIN TAB 1000 MCG 5000 MCG: 1000 TAB at 08:08

## 2020-01-01 RX ADMIN — GABAPENTIN 600 MG: 600 TABLET, FILM COATED ORAL at 08:15

## 2020-01-01 RX ADMIN — CALCIUM CHLORIDE 2 G: 100 INJECTION INTRAVENOUS; INTRAVENTRICULAR at 10:41

## 2020-01-01 RX ADMIN — Medication 10 ML: at 04:23

## 2020-01-01 RX ADMIN — METOLAZONE 2.5 MG: 2.5 TABLET ORAL at 08:43

## 2020-01-01 RX ADMIN — METOPROLOL TARTRATE 25 MG: 25 TABLET, FILM COATED ORAL at 09:58

## 2020-01-01 RX ADMIN — BUPROPION HYDROCHLORIDE 300 MG: 150 TABLET, EXTENDED RELEASE ORAL at 09:09

## 2020-01-01 RX ADMIN — GABAPENTIN 100 MG: 100 CAPSULE ORAL at 09:08

## 2020-01-01 RX ADMIN — BUMETANIDE 2 MG: 0.25 INJECTION INTRAMUSCULAR; INTRAVENOUS at 17:52

## 2020-01-01 RX ADMIN — GABAPENTIN 600 MG: 600 TABLET, FILM COATED ORAL at 09:39

## 2020-01-01 RX ADMIN — IOHEXOL 10 ML: 240 INJECTION, SOLUTION INTRATHECAL; INTRAVASCULAR; INTRAVENOUS; ORAL at 16:41

## 2020-01-01 RX ADMIN — SODIUM CHLORIDE, PRESERVATIVE FREE 10 ML: 5 INJECTION INTRAVENOUS at 09:57

## 2020-01-01 RX ADMIN — CARVEDILOL 3.12 MG: 3.12 TABLET, FILM COATED ORAL at 08:43

## 2020-01-01 RX ADMIN — FUROSEMIDE 40 MG: 10 INJECTION, SOLUTION INTRAMUSCULAR; INTRAVENOUS at 18:57

## 2020-01-01 RX ADMIN — CARVEDILOL 3.12 MG: 3.12 TABLET, FILM COATED ORAL at 17:52

## 2020-01-01 RX ADMIN — Medication 500 MG: at 08:10

## 2020-01-01 RX ADMIN — PANTOPRAZOLE SODIUM 40 MG: 40 TABLET, DELAYED RELEASE ORAL at 08:15

## 2020-01-01 RX ADMIN — ASPIRIN 81 MG: 81 TABLET, COATED ORAL at 09:39

## 2020-01-01 RX ADMIN — METOPROLOL SUCCINATE 100 MG: 50 TABLET, EXTENDED RELEASE ORAL at 09:09

## 2020-01-01 RX ADMIN — LEVOTHYROXINE SODIUM 25 MCG: 0.03 TABLET ORAL at 06:18

## 2020-01-01 RX ADMIN — INSULIN LISPRO 1 UNITS: 100 INJECTION, SOLUTION INTRAVENOUS; SUBCUTANEOUS at 17:53

## 2020-01-01 SDOH — SOCIAL STABILITY: SOCIAL NETWORK: ARE YOU MARRIED, WIDOWED, DIVORCED, SEPARATED, NEVER MARRIED, OR LIVING WITH A PARTNER?: WIDOWED

## 2020-01-01 SDOH — ECONOMIC STABILITY: TRANSPORTATION INSECURITY
IN THE PAST 12 MONTHS, HAS LACK OF TRANSPORTATION KEPT YOU FROM MEETINGS, WORK, OR FROM GETTING THINGS NEEDED FOR DAILY LIVING?: NO

## 2020-01-01 SDOH — HEALTH STABILITY: PHYSICAL HEALTH: ON AVERAGE, HOW MANY MINUTES DO YOU ENGAGE IN EXERCISE AT THIS LEVEL?: 0 MIN

## 2020-01-01 SDOH — ECONOMIC STABILITY: FOOD INSECURITY: WITHIN THE PAST 12 MONTHS, THE FOOD YOU BOUGHT JUST DIDN'T LAST AND YOU DIDN'T HAVE MONEY TO GET MORE.: NEVER TRUE

## 2020-01-01 SDOH — SOCIAL STABILITY: SOCIAL NETWORK: HOW OFTEN DO YOU ATTENT MEETINGS OF THE CLUB OR ORGANIZATION YOU BELONG TO?: NEVER

## 2020-01-01 SDOH — SOCIAL STABILITY: SOCIAL NETWORK
DO YOU BELONG TO ANY CLUBS OR ORGANIZATIONS SUCH AS CHURCH GROUPS UNIONS, FRATERNAL OR ATHLETIC GROUPS, OR SCHOOL GROUPS?: NO

## 2020-01-01 SDOH — ECONOMIC STABILITY: INCOME INSECURITY: HOW HARD IS IT FOR YOU TO PAY FOR THE VERY BASICS LIKE FOOD, HOUSING, MEDICAL CARE, AND HEATING?: NOT HARD AT ALL

## 2020-01-01 SDOH — ECONOMIC STABILITY: FOOD INSECURITY: WITHIN THE PAST 12 MONTHS, YOU WORRIED THAT YOUR FOOD WOULD RUN OUT BEFORE YOU GOT MONEY TO BUY MORE.: NEVER TRUE

## 2020-01-01 SDOH — SOCIAL STABILITY: SOCIAL NETWORK: HOW OFTEN DO YOU ATTEND CHURCH OR RELIGIOUS SERVICES?: MORE THAN 4 TIMES PER YEAR

## 2020-01-01 SDOH — HEALTH STABILITY: MENTAL HEALTH
STRESS IS WHEN SOMEONE FEELS TENSE, NERVOUS, ANXIOUS, OR CAN'T SLEEP AT NIGHT BECAUSE THEIR MIND IS TROUBLED. HOW STRESSED ARE YOU?: ONLY A LITTLE

## 2020-01-01 SDOH — ECONOMIC STABILITY: TRANSPORTATION INSECURITY
IN THE PAST 12 MONTHS, HAS THE LACK OF TRANSPORTATION KEPT YOU FROM MEDICAL APPOINTMENTS OR FROM GETTING MEDICATIONS?: NO

## 2020-01-01 SDOH — HEALTH STABILITY: PHYSICAL HEALTH: ON AVERAGE, HOW MANY DAYS PER WEEK DO YOU ENGAGE IN MODERATE TO STRENUOUS EXERCISE (LIKE A BRISK WALK)?: 0 DAYS

## 2020-01-01 SDOH — SOCIAL STABILITY: SOCIAL NETWORK: HOW OFTEN DO YOU GET TOGETHER WITH FRIENDS OR RELATIVES?: MORE THAN THREE TIMES A WEEK

## 2020-01-01 SDOH — SOCIAL STABILITY: SOCIAL NETWORK
IN A TYPICAL WEEK, HOW MANY TIMES DO YOU TALK ON THE PHONE WITH FAMILY, FRIENDS, OR NEIGHBORS?: MORE THAN THREE TIMES A WEEK

## 2020-01-01 SDOH — HEALTH STABILITY: MENTAL HEALTH: HOW OFTEN DO YOU HAVE A DRINK CONTAINING ALCOHOL?: NEVER

## 2020-01-01 ASSESSMENT — PAIN - FUNCTIONAL ASSESSMENT
PAIN_FUNCTIONAL_ASSESSMENT: ACTIVITIES ARE NOT PREVENTED

## 2020-01-01 ASSESSMENT — PAIN SCALES - GENERAL
PAINLEVEL_OUTOF10: 5
PAINLEVEL_OUTOF10: 3
PAINLEVEL_OUTOF10: 0
PAINLEVEL_OUTOF10: 3
PAINLEVEL_OUTOF10: 0
PAINLEVEL_OUTOF10: 0
PAINLEVEL_OUTOF10: 8
PAINLEVEL_OUTOF10: 4
PAINLEVEL_OUTOF10: 2
PAINLEVEL_OUTOF10: 0
PAINLEVEL_OUTOF10: 4
PAINLEVEL_OUTOF10: 3
PAINLEVEL_OUTOF10: 0
PAINLEVEL_OUTOF10: 6
PAINLEVEL_OUTOF10: 5
PAINLEVEL_OUTOF10: 4
PAINLEVEL_OUTOF10: 6
PAINLEVEL_OUTOF10: 0
PAINLEVEL_OUTOF10: 8
PAINLEVEL_OUTOF10: 6
PAINLEVEL_OUTOF10: 1
PAINLEVEL_OUTOF10: 0
PAINLEVEL_OUTOF10: 6
PAINLEVEL_OUTOF10: 4
PAINLEVEL_OUTOF10: 8
PAINLEVEL_OUTOF10: 6
PAINLEVEL_OUTOF10: 0
PAINLEVEL_OUTOF10: 5
PAINLEVEL_OUTOF10: 0
PAINLEVEL_OUTOF10: 0

## 2020-01-01 ASSESSMENT — PATIENT HEALTH QUESTIONNAIRE - PHQ9
SUM OF ALL RESPONSES TO PHQ9 QUESTIONS 1 & 2: 0
2. FEELING DOWN, DEPRESSED OR HOPELESS: 0
SUM OF ALL RESPONSES TO PHQ QUESTIONS 1-9: 0
SUM OF ALL RESPONSES TO PHQ QUESTIONS 1-9: 0
SUM OF ALL RESPONSES TO PHQ QUESTIONS 1-9: 1
SUM OF ALL RESPONSES TO PHQ QUESTIONS 1-9: 1
1. LITTLE INTEREST OR PLEASURE IN DOING THINGS: 0

## 2020-01-01 ASSESSMENT — ENCOUNTER SYMPTOMS
EYE REDNESS: 0
CHEST TIGHTNESS: 0
WHEEZING: 0
SHORTNESS OF BREATH: 1
EYES NEGATIVE: 1
BACK PAIN: 0
WHEEZING: 0
SHORTNESS OF BREATH: 1
VOMITING: 0
ABDOMINAL PAIN: 0
WHEEZING: 0
NAUSEA: 0
WHEEZING: 0
ABDOMINAL PAIN: 0
ABDOMINAL PAIN: 0
TROUBLE SWALLOWING: 0
DIARRHEA: 0
GASTROINTESTINAL NEGATIVE: 1
COUGH: 0
NAUSEA: 0
NAUSEA: 0
DIARRHEA: 0
SORE THROAT: 0
CHEST TIGHTNESS: 1
SHORTNESS OF BREATH: 0
SINUS PRESSURE: 0
COUGH: 0
RHINORRHEA: 0
SHORTNESS OF BREATH: 0
DIARRHEA: 0
VOMITING: 0
SHORTNESS OF BREATH: 1
EYE DISCHARGE: 0
BACK PAIN: 0
SORE THROAT: 0
CHEST TIGHTNESS: 0
CONSTIPATION: 0
COUGH: 0
BACK PAIN: 1
CONSTIPATION: 0
TROUBLE SWALLOWING: 0

## 2020-01-01 ASSESSMENT — PAIN DESCRIPTION - ONSET
ONSET: ON-GOING

## 2020-01-01 ASSESSMENT — PAIN DESCRIPTION - LOCATION
LOCATION: HEAD
LOCATION: HIP;GROIN
LOCATION: BACK;HIP
LOCATION: SHOULDER
LOCATION: SHOULDER
LOCATION: GENERALIZED
LOCATION: HIP;GROIN
LOCATION: SHOULDER
LOCATION: GROIN
LOCATION: ARM;HAND
LOCATION: BACK;HIP

## 2020-01-01 ASSESSMENT — PAIN DESCRIPTION - PAIN TYPE
TYPE: ACUTE PAIN
TYPE: CHRONIC PAIN
TYPE: ACUTE PAIN
TYPE: CHRONIC PAIN
TYPE: CHRONIC PAIN

## 2020-01-01 ASSESSMENT — PAIN DESCRIPTION - DESCRIPTORS
DESCRIPTORS: ACHING
DESCRIPTORS: SHARP

## 2020-01-01 ASSESSMENT — PAIN DESCRIPTION - ORIENTATION
ORIENTATION: RIGHT
ORIENTATION: RIGHT
ORIENTATION: LEFT
ORIENTATION: RIGHT
ORIENTATION: LEFT

## 2020-01-01 ASSESSMENT — PAIN DESCRIPTION - FREQUENCY
FREQUENCY: INTERMITTENT
FREQUENCY: CONTINUOUS
FREQUENCY: INTERMITTENT
FREQUENCY: CONTINUOUS
FREQUENCY: CONTINUOUS

## 2020-01-01 ASSESSMENT — PAIN DESCRIPTION - PROGRESSION: CLINICAL_PROGRESSION: NOT CHANGED

## 2020-01-01 ASSESSMENT — LIFESTYLE VARIABLES: HOW OFTEN DO YOU HAVE A DRINK CONTAINING ALCOHOL: 0

## 2020-01-05 PROBLEM — I50.9 HEART FAILURE (HCC): Status: ACTIVE | Noted: 2020-01-01

## 2020-01-06 NOTE — PROGRESS NOTES
Pertinent Labs      Electronically signed by Steve Leigh RD, LD on 1/6/20 at 2:46 PM    Contact Number: [phone]

## 2020-01-06 NOTE — ED PROVIDER NOTES
7.9 (*)     GFR Non- 30 (*)     GFR  37 (*)     All other components within normal limits   TROPONIN - Abnormal; Notable for the following components:    Troponin, High Sensitivity 42 (*)     All other components within normal limits   BRAIN NATRIURETIC PEPTIDE - Abnormal; Notable for the following components:    Pro-BNP 5,542 (*)     All other components within normal limits   TROPONIN - Abnormal; Notable for the following components:    Troponin, High Sensitivity 41 (*)     All other components within normal limits   CULTURE BLOOD #1   URINALYSIS   LACTIC ACID   MICROSCOPIC URINALYSIS         EMERGENCY DEPARTMENT COURSE:   Vitals:    Vitals:    01/05/20 1900 01/05/20 2025 01/05/20 2126 01/05/20 2127   BP: (!) 92/55 (!) 99/56 (!) 99/57    Pulse: 88 82 82    Resp: 17      Temp: 97.4 °F (36.3 °C)      TempSrc: Tympanic      SpO2: 96% 95%  96%   Weight: 231 lb (104.8 kg)      Height: 6' (1.829 m)        -------------------------  BP: (!) 99/57, Temp: 97.4 °F (36.3 °C), Pulse: 82, Resp: 17    Orders Placed This Encounter   Medications    0.9 % sodium chloride bolus           Re-evaluation Notes    Labs showed elevated BUN and creatinine, consistent with mild dehydration. His proBNP was elevated at 5500. Chest x-ray shows improvement of his previous x-ray, which was several weeks ago. At this point, I do feel he needs to be admitted. I did speak with nurse practitioner on-call Steffi Jasso, who is agreeable    CRITICAL CARE:   IP CONSULT TO DIETITIAN          FINAL IMPRESSION      1.  Systolic congestive heart failure, unspecified HF chronicity ([facility] [address]          DISPOSITION/PLAN   DISPOSITION Admitted 01/05/2020 10:13:56 PM      Condition on Disposition    Stable    PATIENT REFERRED TO:  DO Verona Hughes. [address] [id number] [phone]            DISCHARGE MEDICATIONS:  New Prescriptions    No medications on file       (Please note that portions of this

## 2020-01-06 NOTE — FLOWSHEET NOTE
Assessment: Patient is alone in room sitting on the side of the bed, awake and oriented. Family is supportive. Intervention: Patient engaged in conversation. He hopes to return home today. Discussed ACP with him. He has a living will and health care power of  but it is not in electronic record. Asked him to provide a copy whenever possible. Patient expressed gratitude for visit and continued prayers. Plan: Chaplains remain available for spiritual and emotional support.      01/06/20 0936   Encounter Summary   Services provided to: Patient   Referral/Consult From: [facility] Family members   Continue Visiting   (1/6/20)   Complexity of Encounter Moderate   Length of Encounter 15 minutes   Spiritual Assessment Completed Yes   Advance Care Planning Yes   Spiritual/Cheondoism   Type Spiritual support   Assessment Approachable   Intervention Active listening;Nurtured hope;Sustaining presence/ Ministry of presence   Outcome Expressed gratitude;Engaged in conversation

## 2020-01-06 NOTE — PLAN OF CARE
Problem: Pain:  Goal: Pain level will decrease  Description  Pain level will decrease  1/6/2020 0917 by Demi Miller RN  Outcome: Ongoing  1/6/2020 0045 by Valda Schaumann, RN  Outcome: Ongoing  Goal: Control of acute pain  Description  Control of acute pain  1/6/2020 0917 by Demi Miller RN  Outcome: Ongoing  1/6/2020 0045 by Valda Schaumann, RN  Outcome: Ongoing  Goal: Control of chronic pain  Description  Control of chronic pain  1/6/2020 0917 by Demi Miller RN  Outcome: Ongoing  1/6/2020 0045 by Valda Schaumann, RN  Outcome: Ongoing     Problem: Discharge Planning:  Goal: Discharged to appropriate level of care  Description  Discharged to appropriate level of care  1/6/2020 0917 by Demi Miller RN  Outcome: Ongoing  1/6/2020 0045 by Valda Schaumann, RN  Outcome: Ongoing     Problem:  Activity Intolerance:  Goal: Ability to tolerate increased activity will improve  Description  Ability to tolerate increased activity will improve  1/6/2020 0917 by Demi Miller RN  Outcome: Ongoing  1/6/2020 0045 by Valda Schaumann, RN  Outcome: Ongoing     Problem: Cardiac Output - Decreased:  Goal: Hemodynamic stability will improve  Description  Hemodynamic stability will improve  1/6/2020 0917 by Demi Miller RN  Outcome: Ongoing  1/6/2020 0045 by Valda Schaumann, RN  Outcome: Ongoing     Problem: Fluid Volume - Excess:  Goal: Control of fluid volume excess will improve  Description  Control of fluid volume excess will improve  1/6/2020 0917 by Demi Miller RN  Outcome: Ongoing  1/6/2020 0045 by Valda Schaumann, RN  Outcome: Ongoing     Problem: Gas Exchange - Impaired:  Goal: Levels of oxygenation will improve  Description  Levels of oxygenation will improve  1/6/2020 0917 by Demi Miller RN  Outcome: Ongoing  1/6/2020 0045 by Valda Schaumann, RN  Outcome: Ongoing     Problem: Mood - Altered:  Goal: Mood stable  Description  Mood stable  1/6/2020 0917 by Demi Miller RN  Outcome: Ongoing  1/6/2020 0045 by Hang Herman RN  Outcome: Ongoing     Problem: Tissue Perfusion - Cardiopulmonary, Altered:  Goal: Absence of angina  Description  Absence of angina  1/6/2020 0917 by Ed Sears RN  Outcome: Ongoing  1/6/2020 0045 by Hang Herman RN  Outcome: Ongoing  Goal: Circulation will improve to fullest extent possible  Description  Circulation will improve to fullest extent possible  1/6/2020 0917 by Ed Sears RN  Outcome: Ongoing  1/6/2020 0045 by Hang Herman RN  Outcome: Ongoing  Goal: Hemodynamic stability will improve  Description  Hemodynamic stability will improve  1/6/2020 0917 by Ed Sears RN  Outcome: Ongoing  1/6/2020 0045 by Hang Herman RN  Outcome: Ongoing     Problem: Venous Thromboembolism:  Goal: Will show no signs or symptoms of venous thromboembolism  Description  Will show no signs or symptoms of venous thromboembolism  1/6/2020 0917 by Ed Sears RN  Outcome: Ongoing  1/6/2020 0045 by Hang Herman RN  Outcome: Ongoing  Goal: Absence of signs or symptoms of impaired coagulation  Description  Absence of signs or symptoms of impaired coagulation  1/6/2020 0917 by Ed Sears RN  Outcome: Ongoing  1/6/2020 0045 by Hang Herman RN  Outcome: Ongoing

## 2020-01-06 NOTE — H&P
Laterality Date    CATARACT REMOVAL WITH IMPLANT Bilateral 4/2014    Dr Mary Judge  4/24/2014    with egd    FINE NEEDLE ASPIRATION  12/13/2012    Endoscopic ultrasound with fine needle aspiration of pancreatic mass. [address]    with appendectomy and cholecystectomy    HYDROCELE EXCISION Right     age 20    OTHER SURGICAL HISTORY  03/10/2014    CRT-ICD PULSE GENERATOR REPLACEMENT WITH INTRAOPERATIVE DEFIBRILLATION THRESHOLD TESTING    PACEMAKER PLACEMENT  4/2009    Defibrillator with pacemaker placement and subsequent replacement.  PROSTATE BIOPSY  10/28/2009    Benign prostate.  TONSILLECTOMY      as a child    UPPER GASTROINTESTINAL ENDOSCOPY  04/24/2014    with colonoscopy   [facility]    after gastric bypass surgery       Medications Prior to Admission:    Medications Prior to Admission: HYDROcodone-acetaminophen (NORCO) 5-325 MG per tablet, Take 2 tablets by mouth every morning. furosemide (LASIX) 40 MG tablet, Take 40 mg in the morning and 20 mg at 3:00 pm  aspirin 81 MG tablet, Take 81 mg by mouth daily.   lisinopril (PRINIVIL;ZESTRIL) 5 MG tablet, Take 1 tablet by mouth daily  dicyclomine (BENTYL) 10 MG capsule, Take 1 capsule by mouth 4 times daily as needed (bowel spasms or abdominal pain)  buPROPion (WELLBUTRIN XL) 300 MG extended release tablet, Take 1 tablet by mouth every morning  pantoprazole (PROTONIX) 40 MG tablet, Take 40 mg by mouth daily  triamcinolone (KENALOG) 0.1 % cream, Apply topically 2 times daily as needed  tiZANidine (ZANAFLEX) 2 MG tablet, Take 2 mg by mouth nightly as needed  gabapentin (NEURONTIN) 600 MG tablet, TAKE 1 TABLET BY MOUTH TWICE DAILY  PARoxetine (PAXIL) 40 MG tablet, TAKE 1 TABLET BY MOUTH ONCE DAILY  ondansetron (ZOFRAN) 4 MG tablet, Take 1 tablet by mouth 3 times daily as needed for Nausea or Vomiting  sotalol (BETAPACE) 80 MG tablet, Take 1 tablet by mouth 2 times daily  loratadine (CLARITIN) 10 MG tablet, Take 1 tablet by mouth daily  Cyanocobalamin (VITAMIN B-12) 5000 MCG TBDP, Take 1 tablet by mouth daily   Multiple Vitamins-Minerals (MULTIVITAMIN PO), Take 1 tablet by mouth daily. calcium carbonate 600 MG TABS tablet, Take 1 tablet by mouth daily. Allergies:    Patient has no known allergies. Social History:    reports that he quit smoking about 40 years ago. His smoking use included cigarettes. He has a 20.00 pack-year smoking history. He has never used smokeless tobacco. He reports that he does not drink alcohol or use drugs. Family History:   family history includes Diabetes in his mother; Heart Disease in his mother. REVIEW OF SYSTEMS:  See HPI and problem list; otherwise no other new complaints with respect to HEENT, neck, pulmonary, coronary, GI, , endocrine, musculoskeletal, immune system/connective tissue disease, hematologic, neuropsych, skin, lymphatics, or malignancies. PHYSICAL EXAM:  Vitals:  /71   Pulse 96   Temp 97.7 °F (36.5 °C) (Tympanic)   Resp 16   Ht 6' (1.829 m)   Wt 244 lb (110.7 kg)   SpO2 100%   BMI 33.09 kg/m²     HEENT: Normocephalic and Atraumatic  Neck: Supple, No Masses, Tenderness, Nodularity and No Lymphadenopathy  Chest/Lungs: Clear to Auscultation without Rales, Rhonchi, or Wheezes and Distant Breath Sounds---occasional cough  Cardiac: Regular Rate and Rhythm  GI/Abdomen:  Bowel Sounds Present and Soft, Non-tender, without Guarding or Rebound Tenderness  : Not examined  EXT/Skin: No Cyanosis, No Clubbing and Edema Present---chronic---stable  Neuro: Alert and Oriented and No Localizing Signs/Symptoms        LABS:    CBC with Differential:    Lab Results   Component Value Date    WBC 5.2 01/06/2020    RBC 3.73 01/06/2020    RBC 0-2 09/29/2019    HGB 10.6 01/06/2020    HCT 34.7 01/06/2020     01/06/2020    MCV 93.0 01/06/2020    MCH 28.4 01/06/2020    MCHC 30.5 01/06/2020    RDW 15.4 01/06/2020    NRBC 0 01/06/2019    LYMPHOPCT 17 01/05/2020    LYMPHOPCT 13.40 12/06/2019    LYMPHOPCT 13.40 12/06/2019    MONOPCT 7 01/05/2020    MONOPCT 3.543 12/06/2019    MONOPCT 3.543 12/06/2019    EOSPCT 7.144 12/06/2019    EOSPCT 7.144 12/06/2019    BASOPCT 2 01/05/2020    BASOPCT 1.704 12/06/2019    BASOPCT 1.704 12/06/2019    MONOSABS 0.39 01/05/2020    MONOSABS 0.2000 12/06/2019    MONOSABS 0.2000 12/06/2019    LYMPHSABS 0.96 01/05/2020    LYMPHSABS 0.7562 12/06/2019    LYMPHSABS 0.7562 12/06/2019    EOSABS 0.56 01/05/2020    EOSABS 0.4032 12/06/2019    EOSABS 0.4032 12/06/2019    BASOSABS 0.09 01/05/2020    DIFFTYPE NOT REPORTED 01/05/2020     BMP:    Lab Results   Component Value Date     01/06/2020    K 4.6 01/06/2020     01/06/2020    CO2 26 01/06/2020    BUN 37 01/06/2020    LABALBU 3.9 01/06/2020    LABALBU 3.6 09/30/2019    CREATININE 2.00 01/06/2020    CREATININE 1.4 12/18/2018    CALCIUM 7.8 01/06/2020    GFRAA 39 01/06/2020    LABGLOM 32 01/06/2020    GLUCOSE 129 01/06/2020    GLUCOSE 122 09/30/2019       ASSESSMENT:      Patient Active Problem List   Diagnosis    Dilated cardiomyopathy ([address]    Anemia    Peripheral neuropathy    Chronic kidney disease (CKD), stage III (moderate) ([facility])    Insomnia    Benign prostatic hyperplasia    Diabetes mellitus, type II ([address]    Restless leg syndrome    Obstructive sleep apnea    HTN (hypertension)    Coronary artery disease    Anxiety    Depression    GERD (gastroesophageal reflux disease)    Allergic rhinitis    Abdominal pain    Diabetes ([address]    Chronic kidney disease (CKD)    Dyslipidemia    Morbid obesity with BMI of 40.0-44.9, adult (HCC)    Congestive heart failure (HCC)    Bilateral lower extremity edema    Acute on chronic systolic congestive heart failure (HCC)    Pneumonia due to organism    Heart failure ([address]     Jared Bougie       [facility]  [id number]  80  WM  [gus Macdonald; [place], ECHO---1.18.2016--TRES--moderate LVH--                          NRVSF--mild AR--ns TR--RVSP ~ 30 mm Hg--                           pacemaker--AICD wire present--Grade 2 DD---                           LVEF ~ 30%   Chronic increasing right hip pain--1.5.2020              CT right hip---1.6.2020--stable mild degenerative changes                            right hip with evidence of chondrocalcinosis---stable                             mildly enlarged prostate               X-ray--1.5.2020--pelvis--right hip--moderate bilateral DJD OA  Hypertension  Diabetes Mellitus Type 2  Obstructive sleep apnea  CKD---Stage 3  GERD  Restless leg syndrome  Peripheral neuropathy  Depression--anxiety  Obesity  Hypocalcemia   Tobacco abuse--quit---5.1.1979   PMH:      BPH--abnormal PSA, diarrhea, 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,                  osteoarthritis--knees--shoulder, allergic rhinitis, insomnia, chest                  pain--1.24.2013---chest wall pain component--costochondritis,                  pancreatic biopsy--non-malignant by report, sinusitis, Influenza                 A--2016, acute-on-chronic respiratory distress---2016, chest                  pain---2016, hyperkalemia--2016, RLL--pneumonia---12.6.2019--                  acute--cardiomegaly--small right basilar effusion--right basilar                 infiltrate  PSH:       gastric bypass 1996--[place], appendectomy,                  cholecystectomy, re-attachment thumb, pancreatic biopsy--2012,                  tonsillectomy--childhood, B9 prostate biopsy--2009, central                  hernia repair--1997, EGD-colonoscopy--2014, bilateral                  cataract--IOL--2014    Allergies:  NKDA    Code Status---FULL CODE  OARRS--1.6.2019--no service    PLAN:    1. CHF---diuretics---daily weight---IO---fluid restriction as required  2. Right hip pain---Orthopedics---CT right hip---Orthopedics to review  3. Home medications reviewed  4. DM2--diabetic admission---orders   5.    See orders     Note that over 50 minutes was spent in evaluation of the patient, review of the chart and pertinent records, discussion with family/staff, etc    Lindsay Gan MD  2:19 PM  1/6/2020

## 2020-01-06 NOTE — PROGRESS NOTES
Occupational Therapy   Occupational Therapy Initial Assessment  Date: 2020   Patient Name: Harlan Smith  MRN: [id number]     : 1940    Date of Service: 2020    Discharge Recommendations:  Home with assist PRN     Assessment   Prognosis: Good  Decision Making: Low Complexity  OT Education: OT Role  No Skilled OT: Independent with ADL's;Safe to return home; At baseline function  REQUIRES OT FOLLOW UP: No  Safety Devices  Safety Devices in place: Yes  Type of devices: Left in bed;Call light within reach         Patient Diagnosis(es): The encounter diagnosis was Systolic congestive heart failure, unspecified HF chronicity ([facility] [address] has a past medical history of A-fib ([address] Abnormal PSA, Allergic rhinitis, Anemia, Arthritis, Benign prostatic hypertrophy, CAD (coronary artery disease), CHF (congestive heart failure) ([address] Depression with anxiety, Dilated cardiomyopathy ([address] GERD (gastroesophageal reflux disease), History of blood transfusion, Hypertension, Insomnia, Mass, Obstructive sleep apnea, Peripheral neuropathy, Renal insufficiency, Restless leg syndrome, Type II or unspecified type diabetes mellitus without mention of complication, not stated as uncontrolled, and Ventricular tachycardia ([address] has a past surgical history that includes Gastric bypass surgery (); Tonsillectomy; ventral hernia repair (); pacemaker placement (2009); Prostate biopsy (10/28/2009); fine needle aspiration (2012); other surgical history (03/10/2014); Upper gastrointestinal endoscopy (2014); Colonoscopy (2014); Cataract removal with implant (Bilateral, 2014); and Hydrocele surgery (Right). Subjective   General  Chart Reviewed: Yes  Patient assessed for rehabilitation services?: Yes  Family / Caregiver Present: No  Referring Practitioner: GONZALES Eisenberg  Diagnosis: Heart failure  Subjective  Subjective: Pt rec'd in bed, pleasant and cooperative for OT.  Pt is an [de-identified] y.o. male with heart failure. Patient Currently in Pain: No  Pain Assessment  Pain Assessment: 0-10  Pain Level: 0  Pain Type: Chronic pain  Pain Location: Back; Hip  Pain Orientation: Right  Vital Signs  Temp: 97.7 °F (36.5 °C)  Temp Source: Tympanic  Pulse: 96  Heart Rate Source: Monitor  Resp: 16  BP: 117/71  MAP (mmHg): 81  Patient Currently in Pain: No  Oxygen Therapy  SpO2: 100 %  O2 Device: None (Room air)  Social/Functional History  Social/Functional History  Lives With: Daughter  Type of Home: House  Home Layout: Multi-level, Performs ADL's on one level  Home Access: Ramped entrance  Bathroom Shower/Tub: Tub/Shower unit  Bathroom Toilet: Handicap height  Bathroom Equipment: Grab bars in shower, Tub transfer bench  Bathroom Accessibility: Accessible  Receives Help From: Family  ADL Assistance: Independent  Homemaking Assistance: Independent  Homemaking Responsibilities: Yes  Meal Prep Responsibility: Secondary  Laundry Responsibility: Secondary  Cleaning Responsibility: Secondary  Shopping Responsibility: Secondary  Ambulation Assistance: Independent  Transfer Assistance: Independent  Active : Yes  Mode of Transportation: SUV, Truck, Car  Occupation: Retired     Objective   Vision: Within Functional Limits  Hearing: Within functional limits    Orientation  Overall Orientation Status: Within Functional Limits  Toilet Transfers  Toilet - Technique: Ambulating  Equipment Used: Grab bars  Toilet Transfer: Independent  ADL  LE Dressing: Independent  Toileting: Independent  Bed mobility  Supine to Sit: Independent  Sit to Supine: Independent  Scooting: Independent  Transfers  Stand Step Transfers: Independent  Sit to stand: Independent  Stand to sit:  Independent  Cognition  Overall Cognitive Status: WFL  LUE AROM (degrees)  LUE AROM : Exceptions  LUE General AROM: Decreased ROM in L shoulder  Left Hand AROM (degrees)  Left Hand AROM: WFL  RUE AROM (degrees)  RUE AROM : WFL  Right Hand AROM (degrees)  Right Hand AROM: WFL  LUE Strength  Gross LUE Strength: WFL  RUE Strength  Gross RUE Strength: WFL    Plan   Plan  Times per week: Eval only    Goals          Therapy Time   Individual Concurrent Group Co-treatment   Time In 1488         Time Out 1123         Minutes 12         Timed Code Treatment Minutes: 0 Minutes       Irasema Smith, OT

## 2020-01-06 NOTE — PLAN OF CARE
by Rita Kowalski RN  Outcome: Ongoing  1/6/2020 0917 by Rita Kowalski RN  Outcome: Ongoing  1/6/2020 0045 by Sindy Lloyd RN  Outcome: Ongoing

## 2020-01-06 NOTE — PROGRESS NOTES
Physical Therapy    Facility/Department: [facility]  PROGRESSIVE CARE  Initial Assessment    NAME: Jarad Solano  : 1940  MRN: [id number]    Date of Service: 2020    Discharge Recommendations:  Home with assist PRN        Assessment   Prognosis: Good  Decision Making: Low Complexity  No Skilled PT: Independent with functional mobility   REQUIRES PT FOLLOW UP: No  Activity Tolerance  Activity Tolerance: Patient Tolerated treatment well       Patient Diagnosis(es): The encounter diagnosis was Systolic congestive heart failure, unspecified HF chronicity ([facility] [address] has a past medical history of A-fib ([address] Abnormal PSA, Allergic rhinitis, Anemia, Arthritis, Benign prostatic hypertrophy, CAD (coronary artery disease), CHF (congestive heart failure) ([address] Depression with anxiety, Dilated cardiomyopathy ([address] GERD (gastroesophageal reflux disease), History of blood transfusion, Hypertension, Insomnia, Mass, Obstructive sleep apnea, Peripheral neuropathy, Renal insufficiency, Restless leg syndrome, Type II or unspecified type diabetes mellitus without mention of complication, not stated as uncontrolled, and Ventricular tachycardia ([address] has a past surgical history that includes Gastric bypass surgery (); Tonsillectomy; ventral hernia repair (); pacemaker placement (2009); Prostate biopsy (10/28/2009); fine needle aspiration (2012); other surgical history (03/10/2014); Upper gastrointestinal endoscopy (2014); Colonoscopy (2014); Cataract removal with implant (Bilateral, 2014); and Hydrocele surgery (Right).     Restrictions     Vision/Hearing        Subjective  General  Chart Reviewed: Yes  Patient assessed for rehabilitation services?: Yes  Response To Previous Treatment: Not applicable  Family / Caregiver Present: No  Follows Commands: Within Functional Limits  Pain Screening  Patient Currently in Pain: Yes  Pain Assessment  Pain Assessment: 0-10  Pain Level: 5  Pain Type: Chronic pain  Pain Location: Back; Hip  Vital Signs  Patient Currently in Pain: Yes       Orientation  Orientation  Overall Orientation Status: Within Normal Limits  Social/Functional History  Social/Functional History  Lives With: Daughter  Type of Home: House  Home Layout: Multi-level, Performs ADL's on one level  Bathroom Accessibility: Accessible  Receives Help From: Family  ADL Assistance: Independent  Homemaking Assistance: Independent  Ambulation Assistance: Independent  Transfer Assistance: Independent  Occupation: Retired  Cognition        Objective          AROM RLE (degrees)  RLE AROM: WFL  AROM LLE (degrees)  LLE AROM : WFL  Strength RLE  Strength RLE: WFL  Strength LLE  Strength LLE: WFL        Bed mobility  Supine to Sit: Independent  Sit to Supine: Independent  Scooting: Independent  Transfers  Sit to Stand: Independent  Stand to sit:  Independent  Ambulation  Ambulation?: Yes  Ambulation 1  Surface: level tile  Device: No Device  Assistance: Independent  Gait Deviations: Slow Rachell     Balance  Sitting - Static: Good  Sitting - Dynamic: Good  Standing - Static: Good  Standing - Dynamic: Good        Plan   Safety Devices  Type of devices: Left in bed, Call light within reach    G-Code       OutComes Score                                                  AM-PAC Score             Goals  Short term goals  Time Frame for Short term goals: 1 day  Short term goal 1: Assess functional status       Therapy Time   Individual Concurrent Group Co-treatment   Time In 0920         Time Out 0930         Minutes 10                 Yaneli Zaragoza PT

## 2020-01-06 NOTE — CONSULTS
4/2014); and Hydrocele surgery (Right). Home Medications:    Prior to Admission medications    Medication Sig Start Date End Date Taking? Authorizing Provider   HYDROcodone-acetaminophen (NORCO) 5-325 MG per tablet Take 2 tablets by mouth every morning. Yes Historical Provider, MD   furosemide (LASIX) 40 MG tablet Take 40 mg in the morning and 20 mg at 3:00 pm 12/9/19  Yes Hallie Trevizo   aspirin 81 MG tablet Take 81 mg by mouth daily. Yes Historical Provider, MD   lisinopril (PRINIVIL;ZESTRIL) 5 MG tablet Take 1 tablet by mouth daily 12/11/19   Alm Bloch, DO   dicyclomine (BENTYL) 10 MG capsule Take 1 capsule by mouth 4 times daily as needed (bowel spasms or abdominal pain) 12/11/19   Karin Banegash, DO   buPROPion (WELLBUTRIN XL) 300 MG extended release tablet Take 1 tablet by mouth every morning 12/11/19   Karin Banegash, DO   pantoprazole (PROTONIX) 40 MG tablet Take 40 mg by mouth daily    Historical Provider, MD   triamcinolone (KENALOG) 0.1 % cream Apply topically 2 times daily as needed    Historical Provider, MD   tiZANidine (ZANAFLEX) 2 MG tablet Take 2 mg by mouth nightly as needed    Historical Provider, MD   gabapentin (NEURONTIN) 600 MG tablet TAKE 1 TABLET BY MOUTH TWICE DAILY 11/27/19 2/25/20  Roz Henson Black, DO   PARoxetine (PAXIL) 40 MG tablet TAKE 1 TABLET BY MOUTH ONCE DAILY 11/27/19   Roz Henson Black, DO   ondansetron (ZOFRAN) 4 MG tablet Take 1 tablet by mouth 3 times daily as needed for Nausea or Vomiting 10/10/19   Karin Banegash, DO   sotalol (BETAPACE) 80 MG tablet Take 1 tablet by mouth 2 times daily 10/9/19   Karin Banegash, DO   loratadine (CLARITIN) 10 MG tablet Take 1 tablet by mouth daily 9/18/19   Alm Bloch, DO   Cyanocobalamin (VITAMIN B-12) 5000 MCG TBDP Take 1 tablet by mouth daily     Historical Provider, MD   Multiple Vitamins-Minerals (MULTIVITAMIN PO) Take 1 tablet by mouth daily.     Historical Provider, MD   calcium carbonate 600 MG TABS tablet Take 1 tablet by mouth daily. Historical Provider, MD     aspirin, 81 mg, Oral, Daily    buPROPion, 300 mg, Oral, QAM    calcium carbonate, 500 mg, Oral, Daily    vitamin B-12, 5,000 mcg, Oral, Daily    gabapentin, 600 mg, Oral, BID    cetirizine, 5 mg, Oral, Daily    multivitamin, 1 tablet, Oral, Daily    pantoprazole, 40 mg, Oral, Daily    PARoxetine, 40 mg, Oral, Daily    sodium chloride flush, 10 mL, Intravenous, 2 times per day    enoxaparin, 40 mg, Subcutaneous, Daily    carvedilol, 3.125 mg, Oral, BID WC    furosemide, 40 mg, Intravenous, BID    insulin lispro, 0-6 Units, Subcutaneous, TID WC    insulin lispro, 0-3 Units, Subcutaneous, Nightly      Allergies:  Patient has no known allergies. Social History:   reports that he quit smoking about 40 years ago. His smoking use included cigarettes. He has a 20.00 pack-year smoking history. He has never used smokeless tobacco. He reports that he does not drink alcohol or use drugs. Family History: family history includes Diabetes in his mother; Heart Disease in his mother. No h/o sudden cardiac death. REVIEW OF SYSTEMS:    · Constitutional: there has been no unanticipated weight loss. There's been No change in energy level, No change in activity level. · Eyes: No visual changes or diplopia. No scleral icterus. · ENT: No Headaches, hearing loss or vertigo. No mouth sores or sore throat. · Cardiovascular: No cardiac history  · Respiratory: No previous pulmonary problems  · Gastrointestinal: No abdominal pain, appetite loss, blood in stools. No change in bowel or bladder habits. · Genitourinary: No dysuria, trouble voiding, or hematuria. · Musculoskeletal:  No gait disturbance, No weakness or joint complaints. · Integumentary: No rash or pruritis. · Neurological: No headache, diplopia, change in muscle strength, numbness or tingling.  No change in gait, balance, coordination, mood, affect, memory, mentation, 138* 129*     BNP: No results for input(s): BNP in the last 72 hours. PT/INR: No results for input(s): PROTIME, INR in the last 72 hours. APTT:No results for input(s): APTT in the last 72 hours. CARDIAC ENZYMES:No results for input(s): CKTOTAL, CKMB, CKMBINDEX, TROPONINI in the last 72 hours. FASTING LIPID PANEL:  Lab Results   Component Value Date    HDL 24 01/06/2020    LDLCALC 61.8 12/06/2019    TRIG 59 01/06/2020     LIVER PROFILE:  Recent Labs     01/06/20  0709   LABALBU 3.9     Troponins: Invalid input(s): TROPONIN     Other Current Problems  Patient Active Problem List   Diagnosis    Dilated cardiomyopathy ([address]    Anemia    Peripheral neuropathy    Chronic kidney disease [facility]CKD), stage III (moderate) ([facility])    Insomnia    Benign prostatic hyperplasia    Diabetes mellitus, type II ([address]    Restless leg syndrome    Obstructive sleep apnea    HTN (hypertension)    Coronary artery disease    Anxiety    Depression    GERD (gastroesophageal reflux disease)    Allergic rhinitis    Abdominal pain    Diabetes ([address]    Chronic kidney disease (CKD)    Dyslipidemia    Morbid obesity with BMI of 40.0-44.9, adult ([facility])    Congestive heart failure ([facility])    Bilateral lower extremity edema    Acute on chronic systolic congestive heart failure ([address]    Pneumonia due to organism    Heart failure ([address]       Echo 12/6/19  Left ventricle is mildly dilated. Moderate left ventricular hypertrophy. Left ventricular systolic function is severely reduced. Left ventricular  ejection fraction 25 %. Evidence of diastolic dysfunction. Left atrium is severely dilated. Right atrial dilatation. Right ventricular dilatation with normal systolic function. Mild aortic insufficiency. Mild mitral valve thickening. Mild to moderate mitral regurgitation. Mild tricuspid regurgitation. Estimated right ventricular systolic pressure  is 30 mmHg.     IMPRESSION & Recommendations:      Combined acute systolic and diastolic heart failure. Iv diuretics. Feels better. Optimize meds. Diastolic heart failure  On discharge should be on 40 lasix BID, and not take lower dose of 20 in afternoon. 40 is the dose that works for him, so should be on 40 BID. Will need outpatient follow up, has not been seeing cardiology here in [address]  Once breathing improved, can DC home    Discussed with patient, family, and Nurse. Electronically signed by Mendez Alvarado DO on 1/6/2020 at 12:15 PM    Mendez Alvarado, [id number] [facility]  [facility]. [facility]  [phone]

## 2020-01-06 NOTE — CONSULTS
Orthopedic Consult    Requesting Physician: Dr Carmine Sales:  Right hip pain    HISTORY OF PRESENT ILLNESS:      The patient is a [de-identified] y.o. male  who presents with a several week history of increasing right hip pain. No reported injury or trauma. Roxann Days is sharp and localized to the right hip. This is the reason he was admitted to the hospital at this time. He states that the duration of the hip pain has  been for several weeks. No numbness or tingling down the leg. He denies fevers and chills at this time. Also being treated for an exacerbation of CHF during this admission. Plain films suggested DJD of the right hip, possible old femoral neck fracture. Ct scan of right hip is negative for fractures. Past Medical History:    Past Medical History:   Diagnosis Date    A-fib [facility]) 12/2/15    Dr Yue Jeffers    Abnormal PSA     Managed by Dr. Renetta Nicolas.  Allergic rhinitis     Anemia     Arthritis     Benign prostatic hypertrophy     CAD (coronary artery disease)     CHF (congestive heart failure) (HCC)     Depression with anxiety     Dilated cardiomyopathy (HCC)     Severe, with ejection fraction 20 to 25%. Most recent echocardiogram 12/9/2008, mild to moderate aortic insufficiency, mild mitral and tricuspid insufficiency. 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     Obstructive sleep apnea     Peripheral neuropathy     Renal insufficiency     chronic kidney disease  stage 3 sees dr Simona Woodson    Restless leg syndrome     Type II or unspecified type diabetes mellitus without mention of complication, not stated as uncontrolled     Ventricular tachycardia [facility])        Past Surgical History:    Past Surgical History:   Procedure Laterality Date    CATARACT REMOVAL WITH IMPLANT Bilateral 4/2014    Dr Jag Mercedes Daily [place] Teto, APRN - CNP   40 mg at 01/06/20 0815    PARoxetine (PAXIL) tablet 40 mg  40 mg Oral Daily [place] Teto, APRN - CNP   40 mg at 01/06/20 0815    sodium chloride flush 0.9 % injection 10 mL  10 mL Intravenous 2 times per day [place] Teto, APRN - CNP   10 mL at 01/06/20 0817    sodium chloride flush 0.9 % injection 10 mL  10 mL Intravenous PRN [place] Teto, APRN - CNP        magnesium hydroxide (MILK OF MAGNESIA) 400 MG/5ML suspension 30 mL  30 mL Oral Daily PRN [place] Teto, APRN - CNP        ondansetron [facility]) injection 4 mg  4 mg Intravenous Q6H PRN [place] Teto, APRN - CNP        nicotine (NICODERM CQ) 21 MG/24HR 1 patch  1 patch Transdermal Daily PRN [place] Teto, APRN - CNP        enoxaparin (LOVENOX) injection 40 mg  40 mg Subcutaneous Daily [place] Teto, APRN - CNP   Stopped at 01/06/20 0490    acetaminophen (TYLENOL) tablet 650 mg  650 mg Oral Q4H PRN [place] Teto, APRN - CNP        glucose (GLUTOSE) 40 % oral gel 15 g  15 g Oral PRN [place] Teto, APRN - CNP        dextrose 50 % IV solution  12.5 g Intravenous PRN [place] Teto, APRN - CNP        glucagon (rDNA) injection 1 mg  1 mg Intramuscular PRN [place] Teto, APRN - CNP        dextrose 5 % solution  100 mL/hr Intravenous PRN [place] Teto, APRN - CNP        carvedilol (COREG) tablet 3.125 mg  3.125 mg Oral BID WC [place] Teto, APRN - CNP   3.125 mg at 01/06/20 1757    furosemide (LASIX) injection 40 mg  40 mg Intravenous BID [place] Teto, APRN - CNP   40 mg at 01/06/20 1757    insulin lispro (HUMALOG) injection vial 0-6 Units  0-6 Units Subcutaneous TID WC [place] Teto, APRN - CNP        insulin lispro (HUMALOG) injection vial 0-3 Units  0-3 Units Subcutaneous Nightly [place] Teto, APRN - CNP           Allergies:  Patient has no known allergies.     Social History:   Social History     Tobacco Use 231 lb (104.8 kg)     General appearance:  Alert and oriented x 3. No apparent distress, appears stated age and cooperative. HEENT:  Normal cephalic, atraumatic without obvious deformity. Pupils equal, round, and reactive to light. Conjunctivae/corneas clear. Neck: Supple, with full range of motion. No jugular venous distention. Trachea midline. Respiratory:  Normal respiratory effort. No audible Wheezes or Rhonchi. Cardiovascular:  Regular rate and rhythm. Abdomen: Soft, non-tender, non-distended. Musculoskeletal:  RLE:  Denies calf pain to palpation. Decreased range of motion of right hip without deformity. Pt can flex and extend right , turgor normal.  No rashes or lesions. Neurologic:  Neurovascularly intact without any focal sensory/motor deficits. Sensation intact. Capillary Refill: Brisk,< 3 seconds   Peripheral Pulses: +2 palpable, equal bilaterally     DATA:  CBC:   Lab Results   Component Value Date    WBC 5.2 01/06/2020    HGB 10.6 01/06/2020     01/06/2020     BMP:    Lab Results   Component Value Date     01/06/2020    K 4.6 01/06/2020     01/06/2020    CO2 26 01/06/2020    BUN 37 01/06/2020    CREATININE 2.00 01/06/2020    CREATININE 1.4 12/18/2018    CALCIUM 7.8 01/06/2020    GLUCOSE 129 01/06/2020    GLUCOSE 122 09/30/2019     PT/INR:    Lab Results   Component Value Date    PROTIME 12.9 12/06/2019    PROTIME 13.6 09/30/2019    INR 1.2 12/06/2019     Troponin:    Lab Results   Component Value Date    TROPONINI <0.012 10/01/2019     No results for input(s): LIPASE, AMYLASE in the last 72 hours. No results for input(s): AST, ALT, BILIDIR, BILITOT, ALKPHOS in the last 72 hours.     Radiology:   Ct Abdomen Pelvis Wo Contrast Additional Contrast? Oral    Result Date: 12/14/2019  EXAMINATION: CT OF THE ABDOMEN AND PELVIS WITHOUT CONTRAST 12/14/2019 4:17 pm TECHNIQUE: CT of the abdomen and pelvis was performed without the administration of intravenous contrast. Multiplanar ileocecal junction. Although this could be due to underdistention. Xr Chest Standard (2 Vw)    Result Date: 1/6/2020  EXAMINATION: TWO XRAY VIEWS OF THE CHEST 1/6/2020 6:19 am COMPARISON: 01/05/2020 HISTORY: ORDERING SYSTEM PROVIDED HISTORY: CHF TECHNOLOGIST PROVIDED HISTORY: CHF Reason for Exam: Follow up for CHF Acuity: Acute Type of Exam: Subsequent/Follow-up FINDINGS: Cardiac silhouette is borderline prominent. Implantable cardiac device leads overlying the right atrium and ventricle. No definite focal airspace consolidation, sizeable pleural effusion, or pneumothorax. Osseous structures and soft tissues are grossly intact. Cardiomegaly. No convincing evidence for acute cardiopulmonary pathology. Xr Chest Standard (2 Vw)    Result Date: 1/5/2020  EXAMINATION: TWO XRAY VIEWS OF THE CHEST 1/5/2020 8:13 pm COMPARISON: 12/07/2019 HISTORY: ORDERING SYSTEM PROVIDED HISTORY: Chest Pain TECHNOLOGIST PROVIDED HISTORY: Chest Pain Reason for Exam: Increasing shortness of breath and fatigue Acuity: Acute Type of Exam: Initial FINDINGS: Stable enlargement of the cardiac silhouette. AICD unchanged. No pneumothorax. Mediastinal contours normal.  Improved aeration of the right lung base with decreased small right pleural effusion. No left pleural effusion. No acute osseous abnormality. Improved aeration of the right lung base with decreased opacity and decreased small right pleural effusion. Ir Fluoro Guided Needle Placement    Result Date: 1/6/2020  EXAMINATION: FLUOROSCOPIC GUIDED aspiration of the right hip. 1/6/2020 4:22 pm HISTORY: ORDERING SYSTEM PROVIDED HISTORY: hip aspiration TECHNOLOGIST PROVIDED HISTORY: hip aspiration Reason for Exam: Right hip aspiration; fluoro time 1.1 min; 67.19 mGy Acuity: Chronic FLUOROSCOPY DOSE AND TYPE OR TIME AND EXPOSURES: 1.1 minutes. 1 image was obtained. COMPARISON: CT right hip performed earlier today.  PROCEDURE: :  DO Ehsan Judd

## 2020-01-06 NOTE — CARE COORDINATION
Pt requesting home health serves upon discharge for therapy and nursing. Pts daughter lives with Pt and has home health herself.  Pt would like to use the same company his daughter uses, referral made,

## 2020-01-06 NOTE — ED NOTES
Pt transported up to  by [facility] by RN d/t being unable to use the urinal. Pt aware he needs to provide a urine sample. Dr. Joseph Comment updated that daughter mentioned he may have had a stroke earlier today \"around 3 maybe. \"     Chrissy Youssef RN  01/05/20 2033

## 2020-01-06 NOTE — PLAN OF CARE
Problem: Pain:  Goal: Pain level will decrease  Description  Pain level will decrease  Outcome: Ongoing  Goal: Control of acute pain  Description  Control of acute pain  Outcome: Ongoing  Goal: Control of chronic pain  Description  Control of chronic pain  Outcome: Ongoing     Problem: Discharge Planning:  Goal: Discharged to appropriate level of care  Description  Discharged to appropriate level of care  Outcome: Ongoing     Problem:  Activity Intolerance:  Goal: Ability to tolerate increased activity will improve  Description  Ability to tolerate increased activity will improve  Outcome: Ongoing     Problem: Cardiac Output - Decreased:  Goal: Hemodynamic stability will improve  Description  Hemodynamic stability will improve  Outcome: Ongoing     Problem: Fluid Volume - Excess:  Goal: Control of fluid volume excess will improve  Description  Control of fluid volume excess will improve  Outcome: Ongoing     Problem: Gas Exchange - Impaired:  Goal: Levels of oxygenation will improve  Description  Levels of oxygenation will improve  Outcome: Ongoing     Problem: Mood - Altered:  Goal: Mood stable  Description  Mood stable  Outcome: Ongoing     Problem: Tissue Perfusion - Cardiopulmonary, Altered:  Goal: Absence of angina  Description  Absence of angina  Outcome: Ongoing  Goal: Circulation will improve to fullest extent possible  Description  Circulation will improve to fullest extent possible  Outcome: Ongoing  Goal: Hemodynamic stability will improve  Description  Hemodynamic stability will improve  Outcome: Ongoing     Problem: Venous Thromboembolism:  Goal: Will show no signs or symptoms of venous thromboembolism  Description  Will show no signs or symptoms of venous thromboembolism  Outcome: Ongoing  Goal: Absence of signs or symptoms of impaired coagulation  Description  Absence of signs or symptoms of impaired coagulation  Outcome: Ongoing

## 2020-01-06 NOTE — CARE COORDINATION
Pt provided education on current hospitalization and current treatment plan. Pt verbalized understanding.

## 2020-01-07 NOTE — PROGRESS NOTES
Orthopaedic Progress Note      SUBJECTIVE   Mr. Helm Angry is post op day # 0     Patient notes that his right hip pain is much improved following aspiration   Would like to go home      OBJECTIVE      Physical    VITALS:  BP 90/65   Pulse 93   Temp 96.1 °F (35.6 °C) (Tympanic)   Resp 18   Ht 6' (1.829 m)   Wt 244 lb (110.7 kg)   SpO2 95%   BMI 33.09 kg/m²   I/O last 3 completed shifts:   In: 240 [P.O.:240]  Out: -       Right hip now with minimal pain with Internal and external rotation of the hip      Data  CBC:   Lab Results   Component Value Date    WBC 5.1 01/07/2020    HGB 11.3 01/07/2020     01/07/2020     BMP:    Lab Results   Component Value Date     01/07/2020    K 4.0 01/07/2020     01/07/2020    CO2 26 01/07/2020    BUN 37 01/07/2020    CREATININE 1.86 01/07/2020    CREATININE 1.4 12/18/2018    CALCIUM 7.9 01/07/2020    GLUCOSE 119 01/07/2020    GLUCOSE 122 09/30/2019     Uric Acid:  No components found for: URIC  PT/INR:    Lab Results   Component Value Date    PROTIME 12.9 12/06/2019    PROTIME 13.6 09/30/2019    INR 1.2 12/06/2019     Troponin:    Lab Results   Component Value Date    TROPONINI <0.012 10/01/2019     Urine Culture:  No components found for: MAHESH      Current Inpatient Medications    Current Facility-Administered Medications: calcium chloride 2 g in sodium chloride 0.9 % 100 mL IVPB, 2 g, Intravenous, Once  HYDROcodone-acetaminophen (NORCO) 5-325 MG per tablet 1 tablet, 1 tablet, Oral, Q8H PRN  aspirin EC tablet 81 mg, 81 mg, Oral, Daily  buPROPion (WELLBUTRIN XL) extended release tablet 300 mg, 300 mg, Oral, QAM  calcium carbonate (TUMS) chewable tablet 500 mg, 500 mg, Oral, Daily  vitamin B-12 (CYANOCOBALAMIN) tablet 5,000 mcg, 5,000 mcg, Oral, Daily  dicyclomine (BENTYL) capsule 10 mg, 10 mg, Oral, 4x Daily PRN  gabapentin (NEURONTIN) tablet 600 mg, 600 mg, Oral, BID  cetirizine (ZYRTEC) tablet 5 mg, 5 mg, Oral, Daily  multivitamin 1 tablet, 1 tablet, Oral, Daily  pantoprazole (PROTONIX) tablet 40 mg, 40 mg, Oral, Daily  PARoxetine (PAXIL) tablet 40 mg, 40 mg, Oral, Daily  sodium chloride flush 0.9 % injection 10 mL, 10 mL, Intravenous, 2 times per day  sodium chloride flush 0.9 % injection 10 mL, 10 mL, Intravenous, PRN  magnesium hydroxide (MILK OF MAGNESIA) 400 MG/5ML suspension 30 mL, 30 mL, Oral, Daily PRN  ondansetron (ZOFRAN) injection 4 mg, 4 mg, Intravenous, Q6H PRN  nicotine (NICODERM CQ) 21 MG/24HR 1 patch, 1 patch, Transdermal, Daily PRN  enoxaparin (LOVENOX) injection 40 mg, 40 mg, Subcutaneous, Daily  acetaminophen (TYLENOL) tablet 650 mg, 650 mg, Oral, Q4H PRN  glucose (GLUTOSE) 40 % oral gel 15 g, 15 g, Oral, PRN  dextrose 50 % IV solution, 12.5 g, Intravenous, PRN  glucagon (rDNA) injection 1 mg, 1 mg, Intramuscular, PRN  dextrose 5 % solution, 100 mL/hr, Intravenous, PRN  carvedilol (COREG) tablet 3.125 mg, 3.125 mg, Oral, BID WC  furosemide (LASIX) injection 40 mg, 40 mg, Intravenous, BID  insulin lispro (HUMALOG) injection vial 0-6 Units, 0-6 Units, Subcutaneous, TID WC  insulin lispro (HUMALOG) injection vial 0-3 Units, 0-3 Units, Subcutaneous, Nightly        PLAN    OK for Home, f/u Ortho next Tuesday, will continue to follow cultures from an outpatient basis

## 2020-01-07 NOTE — PLAN OF CARE
Problem: Pain:  Goal: Pain level will decrease  Description  Pain level will decrease  1/7/2020 0715 by Trudi Gomez RN  Outcome: Ongoing  1/6/2020 2117 by Davidson Pearson RN  Outcome: Ongoing  Goal: Control of acute pain  Description  Control of acute pain  1/7/2020 0715 by Trudi Gomez RN  Outcome: Ongoing  1/6/2020 2117 by Davidson Pearson RN  Outcome: Ongoing  Goal: Control of chronic pain  Description  Control of chronic pain  1/7/2020 0715 by Trudi Gomez RN  Outcome: Ongoing  1/6/2020 2117 by Davidson Pearson RN  Outcome: Ongoing     Problem: Discharge Planning:  Goal: Discharged to appropriate level of care  Description  Discharged to appropriate level of care  1/7/2020 0715 by Trudi Gomez RN  Outcome: Ongoing  1/6/2020 2117 by Davidson Pearson RN  Outcome: Ongoing     Problem:  Activity Intolerance:  Goal: Ability to tolerate increased activity will improve  Description  Ability to tolerate increased activity will improve  1/7/2020 0715 by Trudi Gomez RN  Outcome: Ongoing  1/6/2020 2117 by Davidson Pearson RN  Outcome: Ongoing     Problem: Cardiac Output - Decreased:  Goal: Hemodynamic stability will improve  Description  Hemodynamic stability will improve  1/7/2020 0715 by Trudi Gomez RN  Outcome: Ongoing  1/6/2020 2117 by Davidson Pearson RN  Outcome: Ongoing     Problem: Fluid Volume - Excess:  Goal: Control of fluid volume excess will improve  Description  Control of fluid volume excess will improve  1/7/2020 0715 by Trudi Gomez RN  Outcome: Ongoing  1/6/2020 2117 by Davidson Pearson RN  Outcome: Ongoing     Problem: Gas Exchange - Impaired:  Goal: Levels of oxygenation will improve  Description  Levels of oxygenation will improve  1/7/2020 0715 by Trudi Gomez RN  Outcome: Ongoing  1/6/2020 2117 by Davidson Pearson RN  Outcome: Ongoing     Problem: Mood - Altered:  Goal: Mood stable  Description  Mood stable  1/7/2020 0715 by Genet Salgado RN  Outcome: Ongoing  1/6/2020 2117 by Janice Qureshi RN  Outcome: Ongoing     Problem: Tissue Perfusion - Cardiopulmonary, Altered:  Goal: Absence of angina  Description  Absence of angina  1/7/2020 0715 by Genet Salgado RN  Outcome: Ongoing  1/6/2020 2117 by Janice Qureshi RN  Outcome: Ongoing  Goal: Circulation will improve to fullest extent possible  Description  Circulation will improve to fullest extent possible  1/7/2020 0715 by Genet Salgado RN  Outcome: Ongoing  1/6/2020 2117 by Janice Qureshi RN  Outcome: Ongoing  Goal: Hemodynamic stability will improve  Description  Hemodynamic stability will improve  1/7/2020 0715 by Genet Salgado RN  Outcome: Ongoing  1/6/2020 2117 by Janice Qureshi RN  Outcome: Ongoing     Problem: Venous Thromboembolism:  Goal: Will show no signs or symptoms of venous thromboembolism  Description  Will show no signs or symptoms of venous thromboembolism  1/7/2020 0715 by Genet Salgado RN  Outcome: Ongoing  1/6/2020 2117 by Janice Qureshi RN  Outcome: Ongoing  Goal: Absence of signs or symptoms of impaired coagulation  Description  Absence of signs or symptoms of impaired coagulation  1/7/2020 0715 by Genet Salgado RN  Outcome: Ongoing  1/6/2020 2117 by Janice Qureshi RN  Outcome: Ongoing     Problem: IP BALANCE  Goal: LTG - Patient will maintain balance to allow for safe/functional mobility  1/7/2020 0715 by Genet Salgado RN  Outcome: Ongoing  1/6/2020 2117 by Janice Qureshi RN  Outcome: Ongoing

## 2020-01-07 NOTE — PROGRESS NOTES
denis  [id number]  [id number]  WM  Melvin James, FP; Veronique Muro,                             Cardiology--ovalle; tf Atilio Tomlinson, Urology]                                                        FULL CODE   PACEMAKER-AICD--NO MRIs    LOVENOX    Anti-infectives:  none    CHF--acute-on-chronic systolic---1.5.2020---due to cardiomyopathy       CXR--1.6.2020---cardiomegaly--NACs       CXR---1.5.2020---improved aeration right lung base--                       decreased opacity and decreased small right pleural effusion           Acute-on-chronic systolic---12.6. [facility] 2019--due to dilated cardiomyopathy           2D ECHO---12.6.2020-----severely dilated                         LA---moderate LVH--severely reduced LVSF--RA                         dilatation--RV dilatation--NRVSF---EWA but opens                        well---mild AI---mild-to-moderate MR--mild TR--                        RVSP ~ 30 mm Hg---LVEF ~ 25%           Acute-on-chronic systolic--3. 2.2019---1.18.2016            DUS---BLE---3. 1.2019--no DVT--limited by BLE edema            EKG---3. 1.2019--#2---ventricular paced rhythm--92            EKG---3. 1.2019--#1---ventricular paced rhythm--89  Severe dilated cardiomyopathy           2D ECHO----3. 4.2019---severe TRES----severely dilated LA--                    LV upper limits normal--severely reduced LVSF--severely                    dilated RA--RV dilatation--NRVSF--trivial TR---mild EWA--                    trivial TR---RVSP ~ 38 mm Hg---aortic root mildly dilated                     4.1 cm---Grade II moderate DD----LVEF ~ 25%           2-D ECHO--12.10.2008--mild-to-moderate AI--mild MI-TI--                     LVEF ~ 20-25%           PACEMAKER--AICD              CRT-ICD pulse generator replacement--intraoperative                           defibrillation threshold testing--3.10.2014               Atrial-biventricular pacing--sensing--2009                Arrhythmia--ventricular tachycardia PVCs--bigeminy--underlying prior rhythm  BLE edema   ASCVD            MI ruled out---1.19.2016            2D ECHO---1.18.2016--TRES--moderate LVH--                          NRVSF--mild AR--ns TR--RVSP ~ 30 mm Hg--                           pacemaker--AICD wire present--Grade 2 DD---                           LVEF ~ 30%   Chronic increasing right hip pain--1.5.2020              CT right hip---1.6.2020--stable mild degenerative changes                            right hip with evidence of chondrocalcinosis---stable                             mildly enlarged prostate               X-ray--1.5.2020--pelvis--right hip--moderate bilateral DJD OA  Hypertension  Diabetes Mellitus Type 2  Obstructive sleep apnea  CKD---Stage 3  GERD  Restless leg syndrome  Peripheral neuropathy  Depression--anxiety  Obesity  Hypocalcemia   Tobacco abuse--quit---5.1.1979   PMH:      BPH--abnormal PSA, diarrhea, 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,                  osteoarthritis--knees--shoulder, allergic rhinitis, insomnia, chest                  pain--1.24.2013---chest wall pain component--costochondritis,                  pancreatic biopsy--non-malignant by report, sinusitis, Influenza                 A--2016, acute-on-chronic respiratory distress---2016, chest                  pain---2016, hyperkalemia--2016, RLL--pneumonia---12.6.2019--                  acute--cardiomegaly--small right basilar effusion--right basilar                 infiltrate  PSH:       gastric bypass 1996--[place], appendectomy,                  cholecystectomy, re-attachment thumb, pancreatic biopsy--2012,                  tonsillectomy--childhood, B9 prostate biopsy--2009, central                  hernia repair--1997, EGD-colonoscopy--2014, bilateral                  cataract--IOL--2014    Allergies:  NKDA      Plan:  1. Home---1.7.2020  2. Home medications reviewed  3. New Coreg 3.125 mg PO q12H  4. Changes---Lasix 40 mg PO BID  5.     Follow up Cardiology---sees Dr. Issac Hutchinson  6. Follow up Dr. Areli Burns, [address] [facility]  7.     See orders     Electronically signed by Lindsay Gan on 1/7/2020 at 4:38 PM    Hospitalist

## 2020-01-08 PROBLEM — M25.551 CHRONIC RIGHT HIP PAIN: Status: ACTIVE | Noted: 2020-01-01

## 2020-01-08 PROBLEM — G89.29 CHRONIC RIGHT HIP PAIN: Status: ACTIVE | Noted: 2020-01-01

## 2020-01-08 PROBLEM — M16.11 PRIMARY OSTEOARTHRITIS OF RIGHT HIP: Status: ACTIVE | Noted: 2020-01-01

## 2020-01-08 NOTE — PROGRESS NOTES
Subjective:      Patient ID: Molina Austin is a [de-identified] y.o. male. Chief Complaint   Patient presents with    Groin Pain     New patient ref ED. Patient reporst a severe pain in right hip that sometimes radiates to his back and groin. Groin Pain   Associated symptoms include shortness of breath. Initial new patient consult, chronic right hip, groin pain. Started approximately one year ago, recent acute pain flare and went to ER. Typically does not get that bad. He was admitted for 3 days, also treated for CHF. Pain is now subsided. He is very active. Pain Assessment  Location of Pain: (right hip )  Location Modifiers: Right  Severity of Pain: 2  Quality of Pain: Sharp  Duration of Pain: Other (Comment)(depends on activity level)  Frequency of Pain: Intermittent  Date Pain First Started: (onset 1 year ago, becoming worse over time )  Aggravating Factors: (movement)  Limiting Behavior: Yes  Relieving Factors: (sitting/laying down)  Result of Injury: No  Work-Related Injury: No    No Known Allergies    Outpatient Medications Marked as Taking for the 1/8/20 encounter (Office Visit) with CECILIO Cardoso CNP   Medication Sig Dispense Refill    furosemide (LASIX) 40 MG tablet Take 1 tablet by mouth 2 times daily 60 tablet 0    carvedilol (COREG) 3.125 MG tablet Take 1 tablet by mouth 2 times daily (with meals) 60 tablet 0    dicyclomine (BENTYL) 10 MG capsule Take 1 capsule by mouth 4 times daily as needed (bowel spasms or abdominal pain) 60 capsule 1    buPROPion (WELLBUTRIN XL) 300 MG extended release tablet Take 1 tablet by mouth every morning 90 tablet 1    HYDROcodone-acetaminophen (NORCO) 5-325 MG per tablet Take 2 tablets by mouth every morning.       pantoprazole (PROTONIX) 40 MG tablet Take 40 mg by mouth daily      triamcinolone (KENALOG) 0.1 % cream Apply topically 2 times daily as needed      tiZANidine (ZANAFLEX) 2 MG tablet Take 2 mg by mouth nightly as needed      gabapentin (NEURONTIN) 600 MG tablet TAKE 1 TABLET BY MOUTH TWICE DAILY 180 tablet 0    PARoxetine (PAXIL) 40 MG tablet TAKE 1 TABLET BY MOUTH ONCE DAILY 90 tablet 0    ondansetron (ZOFRAN) 4 MG tablet Take 1 tablet by mouth 3 times daily as needed for Nausea or Vomiting 30 tablet 0    loratadine (CLARITIN) 10 MG tablet Take 1 tablet by mouth daily 90 tablet 1    Cyanocobalamin (VITAMIN B-12) 5000 MCG TBDP Take 1 tablet by mouth daily       Multiple Vitamins-Minerals (MULTIVITAMIN PO) Take 1 tablet by mouth daily.  calcium carbonate 600 MG TABS tablet Take 1 tablet by mouth daily.  aspirin 81 MG tablet Take 81 mg by mouth daily. Past Medical History:   Diagnosis Date    A-fib [facility]) 12/2/15    Dr Rock Evans Abnormal PSA     Managed by Dr. Maria R Ghotra.  Allergic rhinitis     Anemia     Arthritis     Benign prostatic hypertrophy     CAD (coronary artery disease)     CHF (congestive heart failure) (HCC)     Depression with anxiety     Dilated cardiomyopathy (HCC)     Severe, with ejection fraction 20 to 25%. Most recent echocardiogram 12/9/2008, mild to moderate aortic insufficiency, mild mitral and tricuspid insufficiency. 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  Obstructive sleep apnea     Peripheral neuropathy     Renal insufficiency     chronic kidney disease  stage 3 sees dr Brody Brandi    Restless leg syndrome     Type II or unspecified type diabetes mellitus without mention of complication, not stated as uncontrolled     Ventricular tachycardia [facility])        Past Surgical History:   Procedure Laterality Date    CATARACT REMOVAL WITH IMPLANT Bilateral 4/2014    Dr Argenis Reid  4/24/2014    with egd    FINE NEEDLE ASPIRATION  12/13/2012    Endoscopic ultrasound with fine needle aspiration of pancreatic mass.     GASTRIC BYPASS SURGERY      with appendectomy and cholecystectomy    HYDROCELE EXCISION Right     age 20    OTHER SURGICAL HISTORY  03/10/2014    CRT-ICD PULSE GENERATOR REPLACEMENT WITH INTRAOPERATIVE DEFIBRILLATION THRESHOLD TESTING    PACEMAKER PLACEMENT  2009    Defibrillator with pacemaker placement and subsequent replacement.  PROSTATE BIOPSY  10/28/2009    Benign prostate.     TONSILLECTOMY      as a child    UPPER GASTROINTESTINAL ENDOSCOPY  2014    with colonoscopy   [address]    after gastric bypass surgery       Family History   Problem Relation Age of Onset    Diabetes Mother     Heart Disease Mother        Social History     Socioeconomic History    Marital status:      Spouse name: None    Number of children: None    Years of education: None    Highest education level: None   Occupational History    None   Social Needs    Financial resource strain: None    Food insecurity:     Worry: None     Inability: None    Transportation needs:     Medical: None     Non-medical: None   Tobacco Use    Smoking status: Former Smoker     Packs/day: 1.00     Years: 20.00     Pack years: 20.00     Types: Cigarettes     Last attempt to quit: 1979     Years since quittin.7    Smokeless tobacco: Never Used    Tobacco comment: damien rrt 18   Substance and Sexual Activity    Alcohol use: No     Alcohol/week: 0.0 standard drinks    Drug use: No    Sexual activity: Yes     Partners: Female   Lifestyle    Physical activity:     Days per week: None     Minutes per session: None    Stress: None   Relationships    Social connections:     Talks on phone: None     Gets together: None     Attends Muslim service: None     Active member of club or organization: None     Attends meetings of clubs or organizations: None     Relationship status: None    Intimate partner violence:     Fear of current or ex partner: None     Emotionally abused: None Physically abused: None     Forced sexual activity: None   Other Topics Concern    None   Social History Narrative    None       Review of Systems   Constitutional: Positive for activity change. Respiratory: Positive for shortness of breath. Cardiovascular:        Hx CHF     Musculoskeletal: Positive for arthralgias. Neurological: Negative for weakness and numbness. Psychiatric/Behavioral: Positive for sleep disturbance. Objective:   Physical Exam  Vitals signs reviewed. Constitutional:       General: He is not in acute distress. Appearance: He is well-developed. Interventions: He is not intubated. HENT:      Head: Normocephalic and atraumatic. Pulmonary:      Effort: Pulmonary effort is normal. No tachypnea, bradypnea, accessory muscle usage, prolonged expiration, respiratory distress or retractions. He is not intubated. Musculoskeletal:      Right hip: He exhibits decreased range of motion. Skin:     General: Skin is warm and dry. Nails: There is no clubbing. Neurological:      Mental Status: He is alert and oriented to person, place, and time. Cranial Nerves: No cranial nerve deficit. Psychiatric:         Attention and Perception: Attention and perception normal.         Speech: Speech normal.         Behavior: Behavior normal. Behavior is cooperative. Cognition and Memory: Cognition normal.         Judgment: Judgment normal.         Assessment:      1. Primary osteoarthritis of right hip    2. Chronic right hip pain          Plan:      CT scan right hip reviewed, Recommended scheduling right hip joint injection, since pain has subsided, he would like to hold off.  Will call if pain returns to schedule        CECILIO Luevano - VENITA

## 2020-01-08 NOTE — TELEPHONE ENCOUNTER
CLINICAL PHARMACY CONSULTATION: Transition of Care (SHIELA)  -----------------------------------------------------------------------------------------------  Katie Delgado is a [de-identified] y.o. male  who was discharged from [facility] on 1/7/2020 with a diagnosis of heart failure. Attempt made to contact pt. Left msg on home/mobile TAD requesting call back at [phone] or [phone] option 7. Will continue to attempt to contact pt as appropriate.     Ana Park, PharmD  [address] [place] [address],[address] Clinical Pharmacist  O: [phone]  Toll free: [phone], option 7

## 2020-01-09 NOTE — TELEPHONE ENCOUNTER
CLINICAL PHARMACY CONSULTATION: Transition of Care (SHIELA)  -----------------------------------------------------------------------------------------------  Ana Becker is a [de-identified] y.o. male  who was discharged from [facility] on 1/7/2020 with a diagnosis of heart failure. Contacted patient. Patient reviewed medications with CTC after initial call. Patient did not have any questions about medications and did not want to review his medications again. Not medication concerns were noted in CTC note.     Ramya Pérez, PharmD, [address] CLINICAL PHARMACY  Phone: [phone], or toll free [phone], option 7    CLINICAL PHARMACY NOTE   POST-DISCHARGE TELEPHONE FOLLOW-UP ADDENDUM  For Pharmacy Admin Tracking Only  TCM Call Made?: Yes  [facility]) Select Patient?: Yes  Total # of Interventions Recommended: 0  Total # Interventions Accepted: 0  Intervention Severity:   - Level 1 Intervention Present?: No   - Level 2 #: 0   - Level 3 #: 0  Outreach Status: Patient Refused  Care Coordinator Outreach to Patient?: Yes  Provider Contacted?: No  Time Spent (min): 5

## 2020-01-09 NOTE — CARE COORDINATION
Leidy 45 Transitions Initial Follow Up Call    Call within 2 business days of discharge: Yes    Patient: Nabil Quezada Patient : 1940   MRN: [id number]  Reason for Admission: CHF   Discharge Date: 20 RARS: Readmission Risk Score: 31      Last Discharge [facility]       Complaint Diagnosis Description Type Department Provider    20 Altered Mental Status; Groin Pain Systolic congestive heart failure, unspecified HF chronicity [facility]) ED to Hosp-Admission (Discharged) (ADMITTED) [facility] BRENNAN Gonzalez Spoke with: Sinan    Call to pt who states he is \"pretty good\". Writer reviewed role of CTC following discharge- v/u. Contact information provided. Agreeable to transition calls   Denies CP, SOB or edema. States he is able to lay flat without elevating his head on pillows  States he is coughing \"very little\"  States his daughter filled his pill box with new med and changed dose for lasix   Medications reviewed and reconciled.  1111F complete   Confirms appts with PCP 20 at 1400 and cardiology (Donato Arrington) 20 at [phone]  Denies needs  Encouraged to call writer/ CTC or providers if questions or concerns- v/u       Facility:  [facility]     Non-face-to-face services provided:  Scheduled appointment with PCP-20 at 1400  Scheduled appointment with Specialist-20 at [phone]  Obtained and reviewed discharge summary and/or continuity of care documents  Assessment and support for treatment adherence and medication management-1111F complete    Care Transitions 24 Hour Call    Do you have any ongoing symptoms?:  No  Do you have a copy of your discharge instructions?:  Yes  Do you have all of your prescriptions and are they filled?:  Yes  Have you been contacted by a [employer] [address]?:  No  Have you scheduled your follow up appointment?:  Yes  How are you going to get to your appointment?:  Car - drive self  Were you discharged with any Home Care or Post Acute Services:  No  Post Acute Services:  Home Health  Patient DME:  [address] bed, Lambert Minder walker, Shower chair  Do you have support at home?:  Child  Do you feel like you have everything you need to keep you well at home?:  Yes  Are you an active caregiver in your home?:  No  Care Transitions Interventions                                 Follow Up  Future Appointments   Date Time Provider Leeanne Ferrara   1/14/2020  7:45 AM Amparo Ponce MD [facility]   1/14/2020  2:00 PM [facility]   1/16/2020  3:15 PM MD MARIANA Hurst [facility]   6/11/2020 12:00 PM Anastasia Rizo DO [facility]       Garima Coleman RN

## 2020-01-09 NOTE — LETTER
1/9/2020    [address]      Dear Harlan Smith,    My name is Fiona Marte and I am a registered nurse who partners with Dewayne Delgado DO to improve patients' health. Dewayne Delgado DO believes you would benefit from working with me. As a member of your health care team, I would work with other providers involved in your care, offer education for your specific health conditions, and connect you with additional resources as needed. I will collaborate with Dewayne Delgado DO to support you in following your treatment plan. The additional support I provide is no additional cost to you. My primary focus is to help you achieve specific goals and improve your health. We are committed to walk with you on this journey and look forward to working with you. Please call me to further discuss your healthcare needs. I am available by phone or for appointments at the office. You can reach me at [phone].     Future Appointments   Date Time Provider Leeanne Berryisti   1/14/2020  7:45 AM [facility]   1/14/2020  2:00 PM MD ROBERT Abdalla [facility]   1/16/2020  3:15 PM MD MARIANA Medina [facility]   6/11/2020 12:00 PM [facility]       In good health,       Fiona Marte RN

## 2020-01-09 NOTE — DISCHARGE SUMMARY
Rafa 9                 [address] [place] [address], [address] [place] [address]                               DISCHARGE SUMMARY    PATIENT NAME: Maryse Lazaro                    :        1940  MED REC NO:   [id number]                             ROOM:       0206  ACCOUNT NO:   [de-identified]                           ADMIT DATE: 2020  PROVIDER:     Dionicio Hernandez                  DISCHARGE DATE: 2020    ATTENDING PHYSICIAN OF HOSPITALIZATION:  Elsie Hardwick MD    PERSONAL PHYSICIAN:  Liz Gutierrez DO, [facility], [facility]. The patient seen by Dr. Pamela Hooper, Cardiology, [facility]. Seen by  [facility], [facility] this  hospitalization. DIAGNOSES:  1. Congestive heart failure, acute on chronic systolic , due  to cardiomyopathy. Chest x-ray 2020, cardiomyopathy, no acute  changes. Chest x-ray 2020, improved aeration right lung base,  decreased opacity, decreased mild right pleural effusion. A 2D echo  2019, severely dilated LA, moderate LVH, severely reduced left  ventricular systolic function, RA dilatation, RV dilatation, normal  right ventricular systolic function, EWA but opens well, mild AI,  mild-to-moderate MR, mild TR, RVSP 30 mmHg, LVEF 25%. 2.  Severe dilated cardiomyopathy, pacemaker, AICD, bilateral lower  extremity edema. 3.  Coronary artery disease. 4.  Chronic increasing right hip pain, seen by Dr. Vimal Bryant during this  hospitalization. A CT scan of the right hip 2020, stable mild  degenerative changes of right hip with evidence of chondrocalcinosis  stable, mildly enlarged prostate. X-ray 2020, pelvis and right  hip, moderate bilateral degenerative joint disease, osteoarthritis. 5.  Hypertension. 6.  Diabetes mellitus type 2.  7.  Obstructive sleep apnea. 8.  Chronic kidney disease, stage III, stable. 9.  GERD.   10.  Restless legs

## 2020-01-09 NOTE — CARE COORDINATION
self-management interventions? (Nutrition/Exercise/Self-Monitoring):  No   Have you ever had to go to the ED for symptoms of low blood sugar?:  No       No patient-reported symptoms       and   Congestive Heart Failure Assessment    Are you currently restricting fluids?:  2000cc  Do you understand a low sodium diet?:  Yes  Do you understand how to read food labels?:  Yes  How many restaurant meals do you eat per week?:  5 or more  Do you salt your food before tasting it?:  No     No patient-reported symptoms      Symptoms:      Symptom course:  stable  Patient-reported weight (lb):  235  Weight trend:  decreasing steadily  Salt intake watch compared to last visit:  stable       Ambulatory Care Coordination Assessment    Care Coordination Protocol  Program Enrollment:  Complex Care  Referral from Primary Care Provider:  No  Week 1 - Initial Assessment     Do you have all of your prescriptions and are they filled?:  Yes  Barriers to medication adherence:  None  Are you able to afford your medications?:  Yes  How often do you have trouble taking your medications the way you have been told to take them?:  I always take them as prescribed. Do you have Home O2 Therapy?:  No      Ability to seek help/take action for Emergent Urgent situations i.e. fire, crime, inclement weather or health crisis. :  Independent  Ability to ambulate to restroom:  Independent  Ability handle personal hygeine needs (bathing/dressing/grooming):   Independent  Ability to manage Medications:  Needs Assistance  Ability to prepare Food Preparation:  Independent  Ability to maintain home (clean home, laundry):  Needs Assistance  Ability to drive and/or has transportation:  Independent  Ability to do shopping:  Independent  Ability to manage finances:  Dependent  Is patient able to live independently?:  Yes     Current Housing:  Private Residence        Per the Fall Risk Screening, did the patient have 2 or more falls or 1 fall with injury in the patient can engage in healthcare discussions? (Barriers include language, deafness, aphasia, alcohol or drug problems, learning difficulties, concentration):  Clear and open communication, no identified barriers   Do other services need to be involved to help this patient?:  Other care/services not required at this time   Are current services involved with this patient well-coordinated? (Include coordination with other services you are now recommendation): All required care/services in place and well-coordinated   Suggested Interventions and [place]:  Declined   Meals on Wheels:  Declined   Zone Management Tools: In Process         Set up/Review an Education Plan, Set up/Review Goals              Prior to Admission medications    Medication Sig Start Date End Date Taking? Authorizing Provider   furosemide (LASIX) 40 MG tablet Take 1 tablet by mouth 2 times daily 1/7/20  Yes Parish Martinez   carvedilol (COREG) 3.125 MG tablet Take 1 tablet by mouth 2 times daily (with meals) 1/7/20  Yes Silvia Grant   buPROPion (WELLBUTRIN XL) 300 MG extended release tablet Take 1 tablet by mouth every morning 12/11/19  Yes Italia Rubi,    HYDROcodone-acetaminophen (NORCO) 5-325 MG per tablet Take 2 tablets by mouth every morning. Yes Historical Provider, MD   pantoprazole (PROTONIX) 40 MG tablet Take 40 mg by mouth daily   Yes Historical Provider, MD   gabapentin (NEURONTIN) 600 MG tablet TAKE 1 TABLET BY MOUTH TWICE DAILY 11/27/19 2/25/20 Yes Sourav Paula, DO   PARoxetine (PAXIL) 40 MG tablet TAKE 1 TABLET BY MOUTH ONCE DAILY 11/27/19  Yes Cole Paula, DO   loratadine (CLARITIN) 10 MG tablet Take 1 tablet by mouth daily 9/18/19  Yes Italia Rubi,    Cyanocobalamin (VITAMIN B-12) 5000 MCG TBDP Take 1 tablet by mouth daily    Yes Historical Provider, MD   Multiple Vitamins-Minerals (MULTIVITAMIN PO) Take 1 tablet by mouth daily.    Yes Historical Provider, MD   calcium carbonate 600 MG TABS tablet Take 1 tablet by mouth daily. Yes Historical Provider, MD   aspirin 81 MG tablet Take 81 mg by mouth daily.    Yes Historical Provider, MD   dicyclomine (BENTYL) 10 MG capsule Take 1 capsule by mouth 4 times daily as needed (bowel spasms or abdominal pain)  Patient not taking: Reported on 1/9/2020 12/11/19   Colette Kim DO   triamcinolone (KENALOG) 0.1 % cream Apply topically 2 times daily as needed    Historical Provider, MD   tiZANidine (ZANAFLEX) 2 MG tablet Take 2 mg by mouth nightly as needed    Historical Provider, MD   ondansetron (ZOFRAN) 4 MG tablet Take 1 tablet by mouth 3 times daily as needed for Nausea or Vomiting  Patient not taking: Reported on 1/9/2020 10/10/19   Colette Kim DO       Future Appointments   Date Time Provider Leeanne Ferrara   1/14/2020  7:45 AM Kylie Leary MD [facility]   1/14/2020  2:00 PM Rai Adams MD [facility]   1/16/2020  3:15 PM MD MARIANA Marie [facility]   6/11/2020 12:00 PM Colette Kim DO [facility]

## 2020-01-14 NOTE — CARE COORDINATION
Ambulatory Care Coordination Note  1/14/2020  CM Risk Score: 12  Charlson 10 Year Mortality Risk Score: 100%     ACC: Sapphire Guillory, RN    Summary Note:  Nikki Drew was referred from report.      He has Cardiomyopathy, CHF, CAD, CKD stage 3, Type II DM, HTN, Depression, GERD  S/P Bariatric Surgery 1996.     Plan of Care :            He declined [facility] care.  updates- declines vaccines. Mailed literature on Influenza after reviewed 1/9/2020. Discussed Urgent Care, Same Day appts, Right Care Right Time- 1/14/2020. Flyers given. Patient voiced understanding. Discussed [address] stated he has completed forms at home and they are not out dated. Review Care Team.                                                                            Met with Nikki Drew while here to be seen [facility] He stated he is doing better every day. He reports that his one daughter comes over daily and he goes to his other's daughter's home and spends nights there. Discussed Fall prevention. He stated he has no throw rugs. He ambulates with walker. General Assessment    Do you have any symptoms that are causing concern?:  No       Diabetes Assessment    Medic Alert ID:  No  Meal Planning:  Avoidance of concentrated sweets   How often do you test your blood sugar?:  No Testing   Do you have barriers with adherence to non-pharmacologic self-management interventions?  (Nutrition/Exercise/Self-Monitoring):  No   Have you ever had to go to the ED for symptoms of low blood sugar?:  No           and   Congestive Heart Failure Assessment    Are you currently restricting fluids?:  2000cc  Do you understand a low sodium diet?:  Yes  Do you understand how to read food labels?:  Yes  How many restaurant meals do you eat per week?:  5 or more  Do you salt your food before Take 2 mg by mouth nightly as needed    Historical Provider, MD   gabapentin (NEURONTIN) 600 MG tablet TAKE 1 TABLET BY MOUTH TWICE DAILY 11/27/19 2/25/20  Lavell Paula,    PARoxetine (PAXIL) 40 MG tablet TAKE 1 TABLET BY MOUTH ONCE DAILY 11/27/19   Lavell Paula,    ondansetron (ZOFRAN) 4 MG tablet Take 1 tablet by mouth 3 times daily as needed for Nausea or Vomiting 10/10/19   Inge Hartmann DO   loratadine (CLARITIN) 10 MG tablet Take 1 tablet by mouth daily 9/18/19   Inge Hartmann,    Cyanocobalamin (VITAMIN B-12) 5000 MCG TBDP Take 1 tablet by mouth daily     Historical Provider, MD   Multiple Vitamins-Minerals (MULTIVITAMIN PO) Take 1 tablet by mouth daily. Historical Provider, MD   calcium carbonate 600 MG TABS tablet Take 1 tablet by mouth daily. Historical Provider, MD   aspirin 81 MG tablet Take 81 mg by mouth daily.     Historical Provider, MD       Future Appointments   Date Time Provider Leeanne Ferrara   1/16/2020  3:15 PM MD MARIANA Tavarez [facility]   6/11/2020 12:00 PM DO ROBERT Cash [facility]

## 2020-01-14 NOTE — PROGRESS NOTES
Post-Discharge Transitional Care Management Services or Hospital Follow Up      Karen Pineda   YOB: 1940    Date of Office Visit:  1/14/2020  Date of Hospital Admission: 1/5/20  Date of Hospital Discharge: 1/7/20  Readmission Risk Score(high >=14%. Medium >=10%):Readmission Risk Score: 31      Care management risk score Rising risk (score 2-5) and Complex Care (Scores >=6): 12     Non face to face  following discharge, date last encounter closed (first attempt may have been earlier): 1/9/2020  2:16 PM 1/9/2020  2:16 PM    Call initiated 2 business days of discharge: Yes     Patient Active Problem List   Diagnosis    Dilated cardiomyopathy ([facility]    Anemia    Peripheral neuropathy    Chronic kidney disease (CKD), stage III (moderate) (HCC)    Insomnia    Benign prostatic hyperplasia    Diabetes mellitus, type II ([facility]    Restless leg syndrome    Obstructive sleep apnea    HTN (hypertension)    Coronary artery disease    Anxiety    Depression    GERD (gastroesophageal reflux disease)    Allergic rhinitis    Abdominal pain    Diabetes ([facility]    Chronic kidney disease (CKD)    Dyslipidemia    Morbid obesity with BMI of 40.0-44.9, adult ([facility]    Congestive heart failure (HCC)    Bilateral lower extremity edema    Acute on chronic systolic congestive heart failure (HCC)    Pneumonia due to organism    Heart failure (HCC)    Primary osteoarthritis of right hip    Chronic right hip pain       No Known Allergies    Medications listed as ordered at the time of discharge from [facility] Medication Instructions PUL:[id number]    Printed on:01/14/20 3540   Medication Information                      aspirin 81 MG tablet  Take 81 mg by mouth daily. buPROPion (WELLBUTRIN XL) 300 MG extended release tablet  Take 1 tablet by mouth every morning             calcium carbonate 600 MG TABS tablet  Take 1 tablet by mouth daily.              carvedilol (COREG) 3.125 MG tablet  Take 1 tablet by mouth 2 times daily (with meals)             Cyanocobalamin (VITAMIN B-12) 5000 MCG TBDP  Take 1 tablet by mouth daily              dicyclomine (BENTYL) 10 MG capsule  Take 1 capsule by mouth 4 times daily as needed (bowel spasms or abdominal pain)             furosemide (LASIX) 40 MG tablet  Take 1 tablet by mouth 2 times daily             gabapentin (NEURONTIN) 600 MG tablet  TAKE 1 TABLET BY MOUTH TWICE DAILY             HYDROcodone-acetaminophen (NORCO) 5-325 MG per tablet  Take 2 tablets by mouth every morning. loratadine (CLARITIN) 10 MG tablet  Take 1 tablet by mouth daily             Multiple Vitamins-Minerals (MULTIVITAMIN PO)  Take 1 tablet by mouth daily. ondansetron (ZOFRAN) 4 MG tablet  Take 1 tablet by mouth 3 times daily as needed for Nausea or Vomiting             pantoprazole (PROTONIX) 40 MG tablet  Take 40 mg by mouth daily             PARoxetine (PAXIL) 40 MG tablet  TAKE 1 TABLET BY MOUTH ONCE DAILY             tiZANidine (ZANAFLEX) 2 MG tablet  Take 2 mg by mouth nightly as needed             triamcinolone (KENALOG) 0.1 % cream  Apply topically 2 times daily as needed                   Medications marked \"taking\" at this time  Outpatient Medications Marked as Taking for the 1/14/20 encounter (Office Visit) with Jason Jacobson MD   Medication Sig Dispense Refill    furosemide (LASIX) 40 MG tablet Take 1 tablet by mouth 2 times daily 60 tablet 0    carvedilol (COREG) 3.125 MG tablet Take 1 tablet by mouth 2 times daily (with meals) 60 tablet 0    dicyclomine (BENTYL) 10 MG capsule Take 1 capsule by mouth 4 times daily as needed (bowel spasms or abdominal pain) 60 capsule 1    buPROPion (WELLBUTRIN XL) 300 MG extended release tablet Take 1 tablet by mouth every morning 90 tablet 1    HYDROcodone-acetaminophen (NORCO) 5-325 MG per tablet Take 2 tablets by mouth every morning.       pantoprazole (PROTONIX) 40 MG tablet Take 40 mg by mouth daily      triamcinolone (KENALOG) 0.1 % cream Apply topically 2 times daily as needed      tiZANidine (ZANAFLEX) 2 MG tablet Take 2 mg by mouth nightly as needed      gabapentin (NEURONTIN) 600 MG tablet TAKE 1 TABLET BY MOUTH TWICE DAILY 180 tablet 0    PARoxetine (PAXIL) 40 MG tablet TAKE 1 TABLET BY MOUTH ONCE DAILY 90 tablet 0    ondansetron (ZOFRAN) 4 MG tablet Take 1 tablet by mouth 3 times daily as needed for Nausea or Vomiting 30 tablet 0    loratadine (CLARITIN) 10 MG tablet Take 1 tablet by mouth daily 90 tablet 1    Cyanocobalamin (VITAMIN B-12) 5000 MCG TBDP Take 1 tablet by mouth daily       Multiple Vitamins-Minerals (MULTIVITAMIN PO) Take 1 tablet by mouth daily.  calcium carbonate 600 MG TABS tablet Take 1 tablet by mouth daily.  aspirin 81 MG tablet Take 81 mg by mouth daily. Medications patient taking as of now reconciled against medications ordered at time of hospital discharge: Yes    Chief Complaint   Patient presents with    Follow-Up from [facility] Dx CHF dc 1-7-20- breathing good, hip has no pain, & overall states he is doing pretty good       HPI Here today for a hospital follow up. His swelling is improving. His blood pressure is a little low today but he has been feeling okay. No dizziness. He weighs himself every day and he has lost 40 pounds in the past 5 weeks. His appetite has been decreased. He is eating okay since he has been home. He has not had any issues with chest pain. He is not feeling short of breath at this time. He is not having any issues with his bowels. He is doing well with his increased dose of lasix. He is not having any issues with headaches. His hip pain has resolved. Inpatient course: Discharge summary reviewed- see chart. Interval history/Current status: improving    Review of Systems   Constitutional: Positive for unexpected weight change (recent weight loss).  Negative for activity change, appetite change, chills, fatigue and fever. Eyes: Negative for visual disturbance. Respiratory: Negative for cough, chest tightness, shortness of breath and wheezing. Cardiovascular: Negative for chest pain, palpitations and leg swelling. Gastrointestinal: Negative for abdominal pain, constipation, diarrhea and nausea. Genitourinary: Negative for difficulty urinating. Musculoskeletal: Negative for arthralgias (hip pain has resolved). Skin: Negative for rash. Neurological: Negative for dizziness, syncope, weakness, light-headedness and headaches. Vitals:    01/14/20 1412   BP: (!) 100/54   Site: Left Upper Arm   Position: Sitting   Cuff Size: Large Adult   Pulse: 103   SpO2: 97%   Weight: 244 lb (110.7 kg)     Body mass index is 33.09 kg/m². Wt Readings from Last 3 Encounters:   01/14/20 244 lb (110.7 kg)   01/14/20 242 lb (109.8 kg)   01/08/20 242 lb 12.8 oz (110.1 kg)     BP Readings from Last 3 Encounters:   01/14/20 (!) 100/54   01/14/20 122/64   01/08/20 122/66       Physical Exam  Vitals signs and nursing note reviewed. Constitutional:       General: He is not in acute distress. Appearance: He is well-developed. Eyes:      Conjunctiva/sclera: Conjunctivae normal.   Neck:      Musculoskeletal: Normal range of motion and neck supple. Cardiovascular:      Rate and Rhythm: Normal rate and regular rhythm. Heart sounds: Normal heart sounds. No murmur. Pulmonary:      Effort: Pulmonary effort is normal. No respiratory distress. Breath sounds: Normal breath sounds. No wheezing or rales. Musculoskeletal: Normal range of motion. Lymphadenopathy:      Cervical: No cervical adenopathy. Skin:     General: Skin is warm and dry. Findings: No rash. Neurological:      Mental Status: He is alert and oriented to person, place, and time. Assessment/Plan:  1. Dilated cardiomyopathy ([address]  Stable; he is feeling pretty good since being home.  He feels like his heart is back to

## 2020-01-16 NOTE — PROGRESS NOTES
every morning 12/11/19  Yes Parrish Bleacher, DO   HYDROcodone-acetaminophen (NORCO) 5-325 MG per tablet Take 2 tablets by mouth every morning. Yes Historical Provider, MD   pantoprazole (PROTONIX) 40 MG tablet Take 40 mg by mouth daily   Yes Historical Provider, MD   triamcinolone (KENALOG) 0.1 % cream Apply topically 2 times daily as needed   Yes Historical Provider, MD   tiZANidine (ZANAFLEX) 2 MG tablet Take 2 mg by mouth nightly as needed   Yes Historical Provider, MD   gabapentin (NEURONTIN) 600 MG tablet TAKE 1 TABLET BY MOUTH TWICE DAILY 11/27/19 2/25/20 Yes Raegan Paula, DO   PARoxetine (PAXIL) 40 MG tablet TAKE 1 TABLET BY MOUTH ONCE DAILY 11/27/19  Yes Vinicio Paula, DO   ondansetron (ZOFRAN) 4 MG tablet Take 1 tablet by mouth 3 times daily as needed for Nausea or Vomiting 10/10/19  Yes Parrish Bleacher, DO   loratadine (CLARITIN) 10 MG tablet Take 1 tablet by mouth daily 9/18/19  Yes Parrish Bleacher, DO   Cyanocobalamin (VITAMIN B-12) 5000 MCG TBDP Take 1 tablet by mouth daily    Yes Historical Provider, MD   Multiple Vitamins-Minerals (MULTIVITAMIN PO) Take 1 tablet by mouth daily. Yes Historical Provider, MD   calcium carbonate 600 MG TABS tablet Take 1 tablet by mouth daily. Yes Historical Provider, MD   aspirin 81 MG tablet Take 81 mg by mouth daily. Yes Historical Provider, MD       Allergies:  Patient has no known allergies. Social History:   reports that he quit smoking about 40 years ago. His smoking use included cigarettes. He has a 20.00 pack-year smoking history. He has never used smokeless tobacco. He reports that he does not drink alcohol or use drugs. REVIEW OF SYSTEMS:  CONSTITUTIONAL:NEGATIVE  HEENT:NEG  Cardiovascular: No chest pain, Yes dyspnea on exertion, Yes palpitations.  Lower extremity edema: Yes  RESPIRATORY: MONTANO  GASTROINTESTINAL:  negative  GENITOURINARY:  negative  INTEGUMENT:  negative  MUSCULOSKELETAL:  positive for  pain  NEUROLOGICAL: negative    PHYSICAL EXAM:      BP 92/60   Pulse 96   Ht 6' (1.829 m)   Wt 247 lb (112 kg)   BMI 33.50 kg/m²    HEENT: PERRL, no cervical lymphadenopathy. No masses palpable. Cardiovascular:  · The apical impulse is not displaced  · Heart  Sounds:RRR, HSM LSB  · Jugular venous pulsation Normal  · The carotid upstroke is normal  · Peripheral pulses are symmetrical and full  Respiratory: Good respiratory effort. On auscultation: clear to auscultation bilaterally  Abdomen:  · No masses or tenderness  · Bowel sounds present  Extremities:  ·  No Cyanosis or Clubbing  ·  Lower extremity edema: Yes  Skin: Warm and dry    Cardiac data:      Echo 12/6/19  Left ventricle is mildly dilated. Moderate left ventricular hypertrophy. Left ventricular systolic function is severely reduced. Left ventricular  ejection fraction 25 %. Evidence of diastolic dysfunction. Left atrium is severely dilated. Right atrial dilatation. Right ventricular dilatation with normal systolic function. Mild aortic insufficiency. Mild mitral valve thickening. Mild to moderate mitral regurgitation. Mild tricuspid regurgitation. Estimated right ventricular systolic pressure  is 30 mmHg. Labs:     CBC: No results for input(s): WBC, HGB, HCT, PLT in the last 72 hours. BMP:   Recent Labs     01/14/20  1502      K 3.9   CO2 28   BUN 39*   CREATININE 1.94*   LABGLOM 33*   GLUCOSE 130*     PT/INR: No results for input(s): PROTIME, INR in the last 72 hours. FASTING LIPID PANEL:  Lab Results   Component Value Date    HDL 24 01/06/2020    LDLCALC 61.8 12/06/2019    TRIG 59 01/06/2020     LIVER PROFILE:No results for input(s): AST, ALT, LABALBU in the last 72 hours. Assessment:    Combined systolic and diastolic heart failure, chronic  ICD in situ  Mild aortic insufficiency. Mild mitral valve thickening. Mild to moderate mitral regurgitation.   CKD  Patient Active Problem List   Diagnosis    Dilated cardiomyopathy ([address]    Anemia   

## 2020-01-17 NOTE — CARE COORDINATION
Ambulatory Care Coordination Note  1/17/2020  CM Risk Score: 12  Charlson 10 Year Mortality Risk Score: 100%     ACC: Cameron Kern, MARSHALL    Summary Note: Vivi Kennedy was referred from report.      He has Cardiomyopathy, CHF, CAD, CKD stage 3, Type II DM, HTN, Depression, GERD  S/P Bariatric Surgery 1996.     Plan of Care :            KV declined [facility] care. F/U on decision to change to cardiology here at [facility].                                      updates- declines vaccines. Mailed literature on Influenza after reviewed 1/9/2020.                                      Discussed Urgent Care, Same Day appts, Right Care Right Time- 1/14/2020. Flyers given. Patient voiced understanding.                                       Discussed [address] stated he has completed forms at home and they are not out dated.   [facility] Team.    Spoke with Vivi Kennedy. He saw cardiologist yesterday. He discussed changing all care for heart to [facility]. He stated cardiologist was okay with his medications etc.He would resume care if Vivi Kennedy decides to change locally. He stated he is going to talk to his girls. He sees Dr. Lori Powell at [facility]. He stated his family does not want him driving to [place] anymore. He stated he is not wearing support- he is aware that cardiologist wants him to wear them. He is aware that this writer will f/u next month. He has contact information for his PCP. General Assessment    Do you have any symptoms that are causing concern?:  No       Diabetes Assessment    Medic Alert ID:  No  Meal Planning:  Avoidance of concentrated sweets   How often do you test your blood sugar?:  No Testing   Do you have barriers with adherence to non-pharmacologic self-management interventions?  (Nutrition/Exercise/Self-Monitoring):  No   Have you ever had to go to the ED for symptoms of low blood sugar?:  No       No patient-reported symptoms and   Congestive Heart Failure Assessment    Are you currently restricting fluids?:  2000cc  Do you understand a low sodium diet?:  Yes  Do you understand how to read food labels?:  Yes  How many restaurant meals do you eat per week?:  5 or more  Do you salt your food before tasting it?:  No     No patient-reported symptoms      Symptoms:      Symptom course:  stable  Patient-reported weight (lb):  247  Weight trend:  fluctuating minimally  Salt intake watch compared to last visit:  stable         Care Coordination Interventions    Program Enrollment:  Complex Care  Referral from Primary Care Provider:  No  Suggested Interventions and Community Resources  Fall Risk Prevention:  Completed  Home Health Services:  Declined  Meals on Wheels:  Declined  Zone Management Tools:  Completed (Comment: DM, CHF)         Goals Addressed                 This Visit's Progress       Patient Stated     Conditions and Symptoms (pt-stated)   On track     I will schedule office visits, as directed by my provider. I will keep my appointment or reschedule if I have to cancel. I will notify my provider of any barriers to my plan of care. I will follow my Zone Management tool to seek urgent or emergent care. I will notify my provider of any symptoms that indicate a worsening of my condition. CHF    Barriers: lack of education  Plan for overcoming my barriers: Care Coordination Intervention  Confidence: 9/10  Anticipated Goal Completion Date: 4/9/2020              Prior to Admission medications    Medication Sig Start Date End Date Taking?  Authorizing Provider   furosemide (LASIX) 40 MG tablet Take 1 tablet by mouth 2 times daily 1/7/20   Cris Amos   carvedilol (COREG) 3.125 MG tablet Take 1 tablet by mouth 2 times daily (with meals) 1/7/20   Cris Amos   dicyclomine (BENTYL) 10 MG capsule Take 1 capsule by mouth 4 times daily as needed (bowel spasms or abdominal pain) 12/11/19   Ulices Barrios DO   buPROPion (WELLBUTRIN XL)

## 2020-01-20 NOTE — CARE COORDINATION
[facility] Transitions Follow Up Call- final call    2020    Patient: Sukhdev Rose  Patient : 1940   MRN: [id number]  Reason for Admission: CHF   Discharge Date: 20 RARS: Readmission Risk Score: 31         Attempted to reach patient for final transitional call. VM left to return call to [phone] or continue communication with providers.  Episode closed         Follow Up  Future Appointments   Date Time Provider Leeanne Ferrara   2020 12:00 PM [facility]       Maximino Lraios RN

## 2020-01-28 NOTE — TELEPHONE ENCOUNTER
Luis Enrique Maguire called requesting a refill of the below medication which has been pended for you:     Requested Prescriptions     Pending Prescriptions Disp Refills    carvedilol (COREG) 3.125 MG tablet 60 tablet 5     Sig: Take 1 tablet by mouth 2 times daily (with meals)       Last Appointment Date: 12/11/2019  Next Appointment Date: 6/11/2020    No Known Allergies

## 2020-02-11 NOTE — CARE COORDINATION
symptoms that are causing concern?:  No       Diabetes Assessment    Medic Alert ID:  No  Meal Planning:  Avoidance of concentrated sweets   How often do you test your blood sugar?:  No Testing   Do you have barriers with adherence to non-pharmacologic self-management interventions? (Nutrition/Exercise/Self-Monitoring):  No   Have you ever had to go to the ED for symptoms of low blood sugar?:  No       No patient-reported symptoms       and   Congestive Heart Failure Assessment    Are you currently restricting fluids?:  2000cc  Do you understand a low sodium diet?:  Yes  Do you understand how to read food labels?:  Yes  How many restaurant meals do you eat per week?:  5 or more  Do you salt your food before tasting it?:  No     No patient-reported symptoms      Symptoms:      Symptom course:  stable  Patient-reported weight (lb):  238  Weight trend:  decreasing steadily  Salt intake watch compared to last visit:  stable         Care Coordination Interventions    Program Enrollment:  Complex Care  Referral from Primary Care Provider:  No  Suggested Interventions and Community Resources  Fall Risk Prevention:  Completed  Home Health Services:  Declined  Meals on Wheels:  Declined  Zone Management Tools:  Completed (Comment: DM, CHF)         Goals Addressed                 This Visit's Progress       Patient Stated     Conditions and Symptoms (pt-stated)   On track     I will schedule office visits, as directed by my provider. I will keep my appointment or reschedule if I have to cancel. I will notify my provider of any barriers to my plan of care. I will follow my Zone Management tool to seek urgent or emergent care. I will notify my provider of any symptoms that indicate a worsening of my condition.  CHF    Barriers: lack of education  Plan for overcoming my barriers: Care Coordination Intervention  Confidence: 9/10  Anticipated Goal Completion Date: 4/9/2020              Prior to Admission medications

## 2020-02-26 NOTE — CARE COORDINATION
Ambulatory Care Coordination Note  2/26/2020  CM Risk Score: 12  Charlson 10 Year Mortality Risk Score: 100%     ACC: Orlando Velasquez RN    Summary Note: Cheryl Cobos was referred from report.      He has Cardiomyopathy, CHF, CAD, CKD stage 3, Type II DM, HTN, Depression, GERD  S/P Bariatric CPWCQPX 2402. [facility] ER 2/16/2020- AICD problem,     Plan of Care :            [facility] declined [facility] care.                                       F/U on decision to change to cardiology here at [facility] He plans to follow with cardiologist in [facility] in June and will need batteries changed and then transfer to [facility].                                        updates- declines vaccines. Mailed literature on Influenza after reviewed 1/9/2020.                                      Discussed Urgent Care, Same Day appts, [facility]- 1/14/2020. Flyers given. Patient voiced understanding.                                       GEIPJBTKO DHNERPRM [address] stated he has completed forms at home and they are not out dated.   [facility].    2/26/2020- 10:06 am LM requesting return call. F/U ER visit 2/16/2020 and ACC. Care Coordination Interventions    Program Enrollment:  Complex Care  Referral from Primary Care Provider:  No  Suggested Interventions and Community Resources  Fall Risk Prevention:  Completed  Home Health Services:  Declined  Meals on Wheels:  Declined  Zone Management Tools:  Completed (Comment: DM, CHF)         Prior to Admission medications    Medication Sig Start Date End Date Taking?  Authorizing Provider   carvedilol (COREG) 3.125 MG tablet Take 1 tablet by mouth 2 times daily (with meals) 1/28/20   Rianna Casiano,    furosemide (LASIX) 40 MG tablet Take 1 tablet by mouth 2 times daily 1/7/20   Mandeep Swan   dicyclomine (BENTYL) 10 MG capsule Take 1 capsule by mouth 4 times daily as needed (bowel spasms or abdominal pain) 12/11/19   Tavia Snyder Temi Naylor,    buPROPion (WELLBUTRIN XL) 300 MG extended release tablet Take 1 tablet by mouth every morning 12/11/19   Juan Arreguin DO   HYDROcodone-acetaminophen (NORCO) 5-325 MG per tablet Take 2 tablets by mouth every morning. Historical Provider, MD   pantoprazole (PROTONIX) 40 MG tablet Take 40 mg by mouth daily    Historical Provider, MD   triamcinolone (KENALOG) 0.1 % cream Apply topically 2 times daily as needed    Historical Provider, MD   tiZANidine (ZANAFLEX) 2 MG tablet Take 2 mg by mouth nightly as needed    Historical Provider, MD   gabapentin (NEURONTIN) 600 MG tablet TAKE 1 TABLET BY MOUTH TWICE DAILY 11/27/19 2/25/20  Lamont Paula DO   PARoxetine (PAXIL) 40 MG tablet TAKE 1 TABLET BY MOUTH ONCE DAILY 11/27/19   Lamont Paula DO   ondansetron (ZOFRAN) 4 MG tablet Take 1 tablet by mouth 3 times daily as needed for Nausea or Vomiting 10/10/19   Juan Arreguin DO   loratadine (CLARITIN) 10 MG tablet Take 1 tablet by mouth daily 9/18/19   Juan Arreguin DO   Cyanocobalamin (VITAMIN B-12) 5000 MCG TBDP Take 1 tablet by mouth daily     Historical Provider, MD   Multiple Vitamins-Minerals (MULTIVITAMIN PO) Take 1 tablet by mouth daily. Historical Provider, MD   calcium carbonate 600 MG TABS tablet Take 1 tablet by mouth daily. Historical Provider, MD   aspirin 81 MG tablet Take 81 mg by mouth daily.     Historical Provider, MD       Future Appointments   Date Time Provider Leeanne Alem   6/11/2020 12:00 PM Juan Arreguin DO [facility]

## 2020-02-28 NOTE — CARE COORDINATION
to the ED for symptoms of low blood sugar?:  No       No patient-reported symptoms       and   Congestive Heart Failure Assessment    Are you currently restricting fluids?:  2000cc  Do you understand a low sodium diet?:  Yes  Do you understand how to read food labels?:  Yes  How many restaurant meals do you eat per week?:  5 or more  Do you salt your food before tasting it?:  No     No patient-reported symptoms      Symptoms:      Symptom course:  stable  Patient-reported weight (lb):  252  Weight trend:  fluctuating minimally  Salt intake watch compared to last visit:  stable         Care Coordination Interventions    Program Enrollment:  Complex Care  Referral from Primary Care Provider:  No  Suggested Interventions and Community Resources  Fall Risk Prevention:  Completed  Home Health Services:  Declined  Meals on Wheels:  Declined  Zone Management Tools:  Completed (Comment: DM, CHF)         Goals Addressed                 This Visit's Progress       Patient Stated     Conditions and Symptoms (pt-stated)   On track     I will schedule office visits, as directed by my provider. I will keep my appointment or reschedule if I have to cancel. I will notify my provider of any barriers to my plan of care. I will follow my Zone Management tool to seek urgent or emergent care. I will notify my provider of any symptoms that indicate a worsening of my condition. CHF    Barriers: lack of education  Plan for overcoming my barriers: Care Coordination Intervention  Confidence: 9/10  Anticipated Goal Completion Date: 4/9/2020              Prior to Admission medications    Medication Sig Start Date End Date Taking?  Authorizing Provider   digoxin (LANOXIN) 250 MCG tablet Take 250 mcg by mouth daily   Yes Historical Provider, MD   potassium chloride (KLOR-CON) 20 MEQ packet Take 20 mEq by mouth daily   Yes Historical Provider, MD   carvedilol (COREG) 3.125 MG tablet Take 1 tablet by mouth 2 times daily (with meals) 1/28/20 Yes Miladys Barraza, DO   furosemide (LASIX) 40 MG tablet Take 1 tablet by mouth 2 times daily 1/7/20  Yes Parish Martinez   dicyclomine (BENTYL) 10 MG capsule Take 1 capsule by mouth 4 times daily as needed (bowel spasms or abdominal pain) 12/11/19  Yes Miladys Barraza DO   buPROPion (WELLBUTRIN XL) 300 MG extended release tablet Take 1 tablet by mouth every morning 12/11/19  Yes Miladys Barraza,    HYDROcodone-acetaminophen (NORCO) 5-325 MG per tablet Take 2 tablets by mouth every morning. Yes Historical Provider, MD   pantoprazole (PROTONIX) 40 MG tablet Take 40 mg by mouth daily   Yes Historical Provider, MD   tiZANidine (ZANAFLEX) 2 MG tablet Take 2 mg by mouth nightly as needed   Yes Historical Provider, MD   PARoxetine (PAXIL) 40 MG tablet TAKE 1 TABLET BY MOUTH ONCE DAILY 11/27/19  Yes Tushar Paula, DO   loratadine (CLARITIN) 10 MG tablet Take 1 tablet by mouth daily 9/18/19  Yes Miladys Barraza DO   Cyanocobalamin (VITAMIN B-12) 5000 MCG TBDP Take 1 tablet by mouth daily    Yes Historical Provider, MD   Multiple Vitamins-Minerals (MULTIVITAMIN PO) Take 1 tablet by mouth daily. Yes Historical Provider, MD   calcium carbonate 600 MG TABS tablet Take 1 tablet by mouth daily. Yes Historical Provider, MD   aspirin 81 MG tablet Take 81 mg by mouth daily.    Yes Historical Provider, MD   triamcinolone (KENALOG) 0.1 % cream Apply topically 2 times daily as needed    Historical Provider, MD   gabapentin (NEURONTIN) 600 MG tablet TAKE 1 TABLET BY MOUTH TWICE DAILY 11/27/19 2/25/20  Tushar Paula,    ondansetron (ZOFRAN) 4 MG tablet Take 1 tablet by mouth 3 times daily as needed for Nausea or Vomiting  Patient not taking: Reported on 2/28/2020 10/10/19   Miladys Barraza DO       Future Appointments   Date Time Provider Leeanne Ferrara   6/11/2020 12:00 PM Miladys Barraza DO [facility]

## 2020-03-02 NOTE — TELEPHONE ENCOUNTER
Renay Booker called requesting a refill of the below medication which has been pended for you: last filled 11/27/2019 #180.; declined Lanoxin and put a note to send to cardiologist.    Requested Prescriptions     Pending Prescriptions Disp Refills    gabapentin (NEURONTIN) 600 MG tablet 180 tablet 0     Sig: Take 1 tablet by mouth 2 times daily for 90 days.     loratadine (CLARITIN) 10 MG tablet 90 tablet 1     Sig: Take 1 tablet by mouth daily     Refused Prescriptions Disp Refills    digoxin (LANOXIN) 250 MCG tablet       Sig: Take 1 tablet by mouth daily       Last Appointment Date: 12/11/2019  Next Appointment Date: 6/11/2020    No Known Allergies

## 2020-03-11 NOTE — CARE COORDINATION
Monitoring Regimen:  Not Testing   Blood Sugar Trends:  No Change       and   Congestive Heart Failure Assessment    Are you currently restricting fluids?:  2000cc  Do you understand a low sodium diet?:  Yes  Do you understand how to read food labels?:  Yes  How many restaurant meals do you eat per week?:  5 or more  Do you salt your food before tasting it?:  No     No patient-reported symptoms      Symptoms:      Symptom course:  stable  Patient-reported weight (lb):  245  Weight trend:  stable  Salt intake watch compared to last visit:  stable         Care Coordination Interventions    Program Enrollment:  Complex Care  Referral from Primary Care Provider:  No  Suggested Interventions and Community Resources  Fall Risk Prevention:  Completed  Home Health Services:  Declined  Meals on Wheels:  Declined  Zone Management Tools:  Completed (Comment: DM, CHF)         Goals Addressed                 This Visit's Progress       Patient Stated     Conditions and Symptoms (pt-stated)   On track     I will schedule office visits, as directed by my provider. I will keep my appointment or reschedule if I have to cancel. I will notify my provider of any barriers to my plan of care. I will follow my Zone Management tool to seek urgent or emergent care. I will notify my provider of any symptoms that indicate a worsening of my condition. CHF    Barriers: lack of education  Plan for overcoming my barriers: Care Coordination Intervention  Confidence: 9/10  Anticipated Goal Completion Date: 4/9/2020              Prior to Admission medications    Medication Sig Start Date End Date Taking? Authorizing Provider   gabapentin (NEURONTIN) 600 MG tablet Take 1 tablet by mouth 2 times daily for 90 days.  3/2/20 5/31/20  Delaney Perez, DO   loratadine (CLARITIN) 10 MG tablet Take 1 tablet by mouth daily 3/2/20   Delaney Perez, DO   digoxin (LANOXIN) 250 MCG tablet Take 250 mcg by mouth daily    Historical Provider, MD   potassium

## 2020-03-11 NOTE — TELEPHONE ENCOUNTER
Wife calling requesting why this was not filled, informed nurse said to contact cardio, wife contacted cardio and they told her that med was filled through ER and referred pt back to PCP, please advise wife if we are unable to fill.

## 2020-03-11 NOTE — TELEPHONE ENCOUNTER
Brent Alvarez called requesting a refill of the below medication which has been pended for you: spoke with Flaca who does his medications and states he sees his cardiologist tomorrow so she will discuss with him as I told her we do not usually prescribe Digoxin. Also asked her to verify if he is taking 20 or 40 mg of his Lasix and she said she will have to check at home and get back with me.      Requested Prescriptions     Pending Prescriptions Disp Refills    furosemide (LASIX) 40 MG tablet 60 tablet 0     Sig: Take 1 tablet by mouth 2 times daily     Refused Prescriptions Disp Refills    digoxin (LANOXIN) 250 MCG tablet       Sig: Take 1 tablet by mouth daily       Last Appointment Date: 12/11/2019  Next Appointment Date: 6/11/2020    No Known Allergies

## 2020-03-17 NOTE — TELEPHONE ENCOUNTER
Otoniel Shah called requesting a refill of the below medication which has been pended for you: cardiologist sent this refill in when seen last.    Requested Prescriptions     Pending Prescriptions Disp Refills    furosemide (LASIX) 40 MG tablet 60 tablet 0     Sig: Take 1 tablet by mouth 2 times daily       Last Appointment Date: 12/11/2019  Next Appointment Date: 6/11/2020    No Known Allergies

## 2020-03-24 NOTE — CARE COORDINATION
Ambulatory Care Coordination Note  3/24/2020  CM Risk Score: 12  Charlson 10 Year Mortality Risk Score: 100%     ACC: Karyn Gitelman, RN    Summary Note: Canelo Smith was referred from report.      He has Cardiomyopathy, CHF, CAD, CKD stage 3, Type II DM, HTN, Depression, GERD  S/P Bariatric PDFRJAY 2116.     [facility] ER 2/16/2020- AICD problem,     Plan of Care :            [place] declined [facility] care.                                        updates- declines vaccines. Mailed literature on Influenza after reviewed 1/9/2020.                                      Discussed Urgent Care, Same Day appts, [facility]- 1/14/2020. Flyers given. Patient voiced understanding.                                                   Medications reviewed 2/28/2020 with Canelolyndsay Smith and Tao Roxie is now on Potassium 20 meq daily and Digoxin 250 mcg daily.                                     [id number] stated he has completed forms at home and they are not out dated.   [facility].     3/24/2020- 8:10 am LM requesting return. 3/24/2020- 12:50 pm LM requesting return call. Care Coordination Interventions    Program Enrollment:  Complex Care  Referral from Primary Care Provider:  No  Suggested Interventions and Community Resources  Fall Risk Prevention:  Completed  Home Health Services:  Declined  Meals on Wheels:  Declined  Zone Management Tools:  Completed (Comment: DM, CHF)         Goals Addressed    None         Prior to Admission medications    Medication Sig Start Date End Date Taking? Authorizing Provider   gabapentin (NEURONTIN) 600 MG tablet Take 1 tablet by mouth 2 times daily for 90 days.  3/2/20 5/31/20  Italia Rubi DO   loratadine (CLARITIN) 10 MG tablet Take 1 tablet by mouth daily 3/2/20   Italia Rubi DO   digoxin (LANOXIN) 250 MCG tablet Take 250 mcg by mouth daily    Historical Provider, MD   potassium chloride (KLOR-CON) 20 MEQ packet Take 20 mEq by mouth daily    Historical Provider, MD   carvedilol (COREG) 3.125 MG tablet Take 1 tablet by mouth 2 times daily (with meals) 1/28/20   Sharmila Hernandez DO   furosemide (LASIX) 40 MG tablet Take 1 tablet by mouth 2 times daily 1/7/20   Peterrebecca Dawn   dicyclomine (BENTYL) 10 MG capsule Take 1 capsule by mouth 4 times daily as needed (bowel spasms or abdominal pain) 12/11/19   Sharmila Hernandez DO   buPROPion (WELLBUTRIN XL) 300 MG extended release tablet Take 1 tablet by mouth every morning 12/11/19   Sharmila Hernandez DO   HYDROcodone-acetaminophen (NORCO) 5-325 MG per tablet Take 2 tablets by mouth every morning. Historical Provider, MD   pantoprazole (PROTONIX) 40 MG tablet Take 40 mg by mouth daily    Historical Provider, MD   triamcinolone (KENALOG) 0.1 % cream Apply topically 2 times daily as needed    Historical Provider, MD   tiZANidine (ZANAFLEX) 2 MG tablet Take 2 mg by mouth nightly as needed    Historical Provider, MD   PARoxetine (PAXIL) 40 MG tablet TAKE 1 TABLET BY MOUTH ONCE DAILY 11/27/19   Amena Paula,    ondansetron (ZOFRAN) 4 MG tablet Take 1 tablet by mouth 3 times daily as needed for Nausea or Vomiting  Patient not taking: Reported on 2/28/2020 10/10/19   Sharmila Hernandez DO   Cyanocobalamin (VITAMIN B-12) 5000 MCG TBDP Take 1 tablet by mouth daily     Historical Provider, MD   Multiple Vitamins-Minerals (MULTIVITAMIN PO) Take 1 tablet by mouth daily. Historical Provider, MD   calcium carbonate 600 MG TABS tablet Take 1 tablet by mouth daily. Historical Provider, MD   aspirin 81 MG tablet Take 81 mg by mouth daily.     Historical Provider, MD       Future Appointments   Date Time Provider Leeanne Ferrara   6/11/2020 12:00 PM Sharmila Hernandez DO [facility]

## 2020-03-30 NOTE — TELEPHONE ENCOUNTER
Warren Hopkins called requesting a refill of the below medication which has been pended for you:     Requested Prescriptions     Pending Prescriptions Disp Refills    furosemide (LASIX) 40 MG tablet 180 tablet 0     Sig: Take 1 tablet by mouth 2 times daily    PARoxetine (PAXIL) 40 MG tablet 90 tablet 0     Sig: TAKE 1 TABLET BY MOUTH ONCE DAILY       Last Appointment Date: 12/11/2019  Next Appointment Date: 6/11/2020    No Known Allergies

## 2020-04-03 NOTE — CARE COORDINATION
Care Provider:  No  Suggested Interventions and Community Resources  Fall Risk Prevention:  Completed  Home Health Services:  Declined  Meals on Wheels:  Declined  Zone Management Tools:  Completed (Comment: DM, CHF)         Goals Addressed                 This Visit's Progress       Patient Stated     Conditions and Symptoms (pt-stated)   On track     I will schedule office visits, as directed by my provider. I will keep my appointment or reschedule if I have to cancel. I will notify my provider of any barriers to my plan of care. I will follow my Zone Management tool to seek urgent or emergent care. I will notify my provider of any symptoms that indicate a worsening of my condition. CHF    Barriers: lack of education  Plan for overcoming my barriers: Care Coordination Intervention  Confidence: 9/10  Anticipated Goal Completion Date: 4/9/2020              Prior to Admission medications    Medication Sig Start Date End Date Taking? Authorizing Provider   furosemide (LASIX) 40 MG tablet Take 1 tablet by mouth 2 times daily 3/30/20   Shekhar Lunsford DO   PARoxetine (PAXIL) 40 MG tablet TAKE 1 TABLET BY MOUTH ONCE DAILY 3/30/20   Shekhar Lunsford DO   gabapentin (NEURONTIN) 600 MG tablet Take 1 tablet by mouth 2 times daily for 90 days.  3/2/20 5/31/20  Shekhar Lunsford DO   loratadine (CLARITIN) 10 MG tablet Take 1 tablet by mouth daily 3/2/20   Shekhar Lunsford DO   digoxin (LANOXIN) 250 MCG tablet Take 250 mcg by mouth daily    Historical Provider, MD   potassium chloride (KLOR-CON) 20 MEQ packet Take 20 mEq by mouth daily    Historical Provider, MD   carvedilol (COREG) 3.125 MG tablet Take 1 tablet by mouth 2 times daily (with meals) 1/28/20   Shekhar Lunsford DO   dicyclomine (BENTYL) 10 MG capsule Take 1 capsule by mouth 4 times daily as needed (bowel spasms or abdominal pain) 12/11/19   Shekhar Lunsford DO   buPROPion (WELLBUTRIN XL) 300 MG extended release tablet Take 1 tablet by mouth every morning 19   Nicole Sarmiento DO   HYDROcodone-acetaminophen (NORCO) 5-325 MG per tablet Take 2 tablets by mouth every morning. Historical Provider, MD   pantoprazole (PROTONIX) 40 MG tablet Take 40 mg by mouth daily    Historical Provider, MD   triamcinolone (KENALOG) 0.1 % cream Apply topically 2 times daily as needed    Historical Provider, MD   tiZANidine (ZANAFLEX) 2 MG tablet Take 2 mg by mouth nightly as needed    Historical Provider, MD   ondansetron (ZOFRAN) 4 MG tablet Take 1 tablet by mouth 3 times daily as needed for Nausea or Vomiting  Patient not taking: Reported on 2020 10/10/19   Nicole Sarmiento DO   Cyanocobalamin (VITAMIN B-12) 5000 MCG TBDP Take 1 tablet by mouth daily     Historical Provider, MD   Multiple Vitamins-Minerals (MULTIVITAMIN PO) Take 1 tablet by mouth daily. Historical Provider, MD   calcium carbonate 600 MG TABS tablet Take 1 tablet by mouth daily. Historical Provider, MD   aspirin 81 MG tablet Take 81 mg by mouth daily. Historical Provider, MD       Future Appointments   Date Time Provider Leeanne Ferrara   2020 12:00 PM Nicole Sarmiento DO [facility]     Patient contacted regarding COVID-19 risk   Care Transition Nurse/ Ambulatory Care Manager contacted the patient by telephone to perform post discharge assessment. Verified name and  with patient as identifiers. Patient has following risk factors of: heart failure. CTN/ACM reviewed discharge instructions, medical action plan and red flags related to discharge diagnosis. Reviewed and educated them on any new and changed medications related to discharge diagnosis. Advised obtaining a 90-day supply of all daily and as-needed medications. Education provided regarding infection prevention, and signs and symptoms of COVID-19 and when to seek medical attention with patient who verbalized understanding.  Discussed exposure protocols and quarantine from [address] you at higher risk

## 2020-06-11 NOTE — PATIENT INSTRUCTIONS
Hospital Outpatient Visit on 06/11/2020   Component Date Value Ref Range Status    Glucose 06/11/2020 273* 70 - 99 mg/dL Final    BUN 06/11/2020 30* 8 - 23 mg/dL Final    CREATININE 06/11/2020 1.78* 0.70 - 1.20 mg/dL Final    Bun/Cre Ratio 06/11/2020 17  9 - 20 Final    Calcium 06/11/2020 7.9* 8.6 - 10.4 mg/dL Final    Sodium 06/11/2020 142  135 - 144 mmol/L Final    Potassium 06/11/2020 3.3* 3.7 - 5.3 mmol/L Final    Chloride 06/11/2020 103  98 - 107 mmol/L Final    CO2 06/11/2020 28  20 - 31 mmol/L Final    Anion Gap 06/11/2020 11  9 - 17 mmol/L Final    Alkaline Phosphatase 06/11/2020 141* 40 - 129 U/L Final    ALT 06/11/2020 31  5 - 41 U/L Final    AST 06/11/2020 35  <40 U/L Final    Total Bilirubin 06/11/2020 0.35  0.3 - 1.2 mg/dL Final    Total Protein 06/11/2020 6.2* 6.4 - 8.3 g/dL Final    Alb 06/11/2020 3.6  3.5 - 5.2 g/dL Final    Albumin/Globulin Ratio 06/11/2020 1.4  1.0 - 2.5 Final    GFR Non- 06/11/2020 37* >60 mL/min Final    GFR  06/11/2020 45* >60 mL/min Final    GFR Comment 06/11/2020        Final    Comment: Average GFR for 79or more years old:   76 mL/min/1.73sq m  Chronic Kidney Disease:   <60 mL/min/1.73sq m  Kidney failure:   <15 mL/min/1.73sq m              eGFR calculated using average adult body mass.  Additional eGFR calculator available at:        [email or web address]            GFR Staging 06/11/2020 NOT REPORTED   Final    WBC 06/11/2020 6.0  3.5 - 11.3 k/uL Final    RBC 06/11/2020 3.74* 4.21 - 5.77 m/uL Final    Hemoglobin 06/11/2020 10.8* 13.0 - 17.0 g/dL Final    Hematocrit 06/11/2020 34.6* 40.7 - 50.3 % Final    MCV 06/11/2020 92.5  82.6 - 102.9 fL Final    MCH 06/11/2020 28.9  25.2 - 33.5 pg Final    MCHC 06/11/2020 31.2  25.2 - 33.5 g/dL Final    RDW 06/11/2020 14.6* 11.8 - 14.4 % Final    Platelets 54/61/1239 147  138 - 453 k/uL Final    MPV 06/11/2020 8.9  8.1 - 13.5 fL Final    NRBC Automated 06/11/2020 0.0  0.0 per 100 WBC Final    Differential Type 06/11/2020 NOT REPORTED   Final    Seg Neutrophils 06/11/2020 75* 36 - 65 % Final    Lymphocytes 06/11/2020 10* 24 - 43 % Final    Monocytes 06/11/2020 6  3 - 12 % Final    Eosinophils % 06/11/2020 8* 1 - 4 % Final    Basophils 06/11/2020 1  0 - 2 % Final    Immature Granulocytes 06/11/2020 0  0 % Final    Segs Absolute 06/11/2020 4.50  1.50 - 8.10 k/uL Final    Absolute Lymph # 06/11/2020 0.61* 1.10 - 3.70 k/uL Final    Absolute Mono # 06/11/2020 0.33  0.10 - 1.20 k/uL Final    Absolute Eos # 06/11/2020 0.48* 0.00 - 0.44 k/uL Final    Basophils Absolute 06/11/2020 0.06  0.00 - 0.20 k/uL Final    Absolute Immature Granulocyte 06/11/2020 <0.03  0.00 - 0.30 k/uL Final    WBC Morphology 06/11/2020 NOT REPORTED   Final    RBC Morphology 06/11/2020 ANISOCYTOSIS PRESENT   Final    Platelet Estimate 25/13/8866 NOT REPORTED   Final    Hemoglobin A1C 06/11/2020 7.9* 4.8 - 5.9 % Final    Estimated Avg Glucose 06/11/2020 180  mg/dL Final       Personalized Preventive Plan for Amberly Sam Rayburn - 6/11/2020  Medicare offers a range of preventive health benefits. Some of the tests and screenings are paid in full while other may be subject to a deductible, co-insurance, and/or copay. Some of these benefits include a comprehensive review of your medical history including lifestyle, illnesses that may run in your family, and various assessments and screenings as appropriate. After reviewing your medical record and screening and assessments performed today your provider may have ordered immunizations, labs, imaging, and/or referrals for you. A list of these orders (if applicable) as well as your Preventive Care list are included within your After Visit Summary for your review.     Other Preventive Recommendations:    · A preventive eye exam performed by an eye specialist is recommended every 1-2 years to screen for glaucoma; cataracts, macular

## 2020-06-11 NOTE — PROGRESS NOTES
and screening values have been reviewed and are found in Flowsheets. The following problems were reviewed today and where indicated follow up appointments were made and/or referrals ordered. Positive Risk Factor Screenings with Interventions:     General Health:  General  In general, how would you say your health is?: (!) Poor  In the past 7 days, have you experienced any of the following? New or Increased Pain, New or Increased Fatigue, Loneliness, Social Isolation, Stress or Anger?: None of These  Do you get the social and emotional support that you need?: Yes  Do you have a Living Will?: Yes  General Health Risk Interventions:  · Poor self-assessment of health status: patient declines any further evaluation/treatment for this issue    Health Habits/Nutrition:  Health Habits/Nutrition  Do you exercise for at least 20 minutes 2-3 times per week?: (!) No  Have you lost any weight without trying in the past 3 months?: (!) Yes  Do you eat fewer than 2 meals per day?: No  Have you seen a dentist within the past year?: (!) No([facility]ulous)  Body mass index is 32.55 kg/m². Health Habits/Nutrition Interventions:  · Inadequate physical activity:  educational materials provided to promote increased physical activity  · Nutritional issues:  educational materials for healthy, well-balanced diet provided  · Dental exam overdue:  endent[facility]    Hearing/Vision:  No exam data present  Hearing/Vision  Do you or your family notice any trouble with your hearing?: (!) Yes  Do you have difficulty driving, watching TV, or doing any of your daily activities because of your eyesight?: No  Have you had an eye exam within the past year?: Yes  Hearing/Vision Interventions:  · Hearing concerns:  patient declines any further evaluation/treatment for hearing issues    ADL:  ADLs  In the past 7 days, did you need help from others to perform any of the following everyday activities?  Eating, dressing, grooming, bathing, toileting, or

## 2020-06-11 NOTE — PROGRESS NOTES
2020     Madhav Gar (:  1940) is a 79 y.o. male, here for evaluation of the following medical concerns:    HPI  Patient comes in today for follow up of chronic health issues Patient does have a known history of diabetes mellitus type 2 with stage III chronic kidney disease. His diabetes is suboptimally controlled. He is actually lost quite a bit of weight in the last year duration but despite that his blood sugars are not as well controlled. I think he eats a fair amount of carbohydrates and sugars in his diet and really eats a fair amount of food at his meals. We did talk about portion control and dietary efforts but I do think at this point as his sugars are running a little bit higher we will add glimepiride 1 mg daily to his regimen. Does have a known history of dyslipidemia and his cholesterol levels are stable and adequately controlled on his current medical therapy. Has a known history of dilated cardiomyopathy and does have a defibrillator in place. Recently his defibrillator was misfiring and had a malfunctioning lead. He did just recently undergo replacement of his defibrillator. Does follow with a cardiologist for management of this issue. Has a known history of major depressive disorder which is stable controlled on his current dose of Paxil. He still has some depressive issues noting that he misses his wife who passed away a few years ago but he seems to be coping reasonably well. Has a known history of acute on chronic CHF and his fluid status is stable at this time with his ongoing use of Lasix. Weight has remained neutral and he has not had any significant edema in his lower extremities. He has a known history of peripheral polyneuropathy and does have ongoing chronic pain in his lower extremities. Does take gabapentin which somewhat helps control his pain.   Has a known history of chronic kidney disease and his creatinine remains elevated but stable compared to Kristopher Xavier, DO        Social History     Tobacco Use    Smoking status: Former Smoker     Packs/day: 1.00     Years: 20.00     Pack years: 20.00     Types: Cigarettes     Last attempt to quit: 1979     Years since quittin.1    Smokeless tobacco: Never Used    Tobacco comment: damien rrt 19   Substance Use Topics    Alcohol use: No     Alcohol/week: 0.0 standard drinks     Frequency: Never        Vitals:    20 1124   BP: 100/60   Site: Right Upper Arm   Position: Sitting   Cuff Size: Large Adult   Pulse: 72   Resp: 18   Temp: 97.7 °F (36.5 °C)   TempSrc: Infrared   SpO2: 96%   Weight: 240 lb (108.9 kg)   Height: 6' (1.829 m)     Estimated body mass index is 32.55 kg/m² as calculated from the following:    Height as of this encounter: 6' (1.829 m). Weight as of this encounter: 240 lb (108.9 kg). Physical Exam  Vitals signs and nursing note reviewed. Constitutional:       General: He is not in acute distress. Appearance: Normal appearance. He is well-developed. He is not diaphoretic. HENT:      Head: Normocephalic and atraumatic. Right Ear: Tympanic membrane, ear canal and external ear normal.      Left Ear: Tympanic membrane, ear canal and external ear normal.      Nose: Nose normal.      Mouth/Throat:      Mouth: Mucous membranes are moist.      Pharynx: Oropharynx is clear. No oropharyngeal exudate. Eyes:      General:         Right eye: No discharge. Left eye: No discharge. Conjunctiva/sclera: Conjunctivae normal.      Pupils: Pupils are equal, round, and reactive to light. Neck:      Musculoskeletal: Normal range of motion and neck supple. Thyroid: No thyromegaly. Cardiovascular:      Rate and Rhythm: Normal rate and regular rhythm. Heart sounds: Murmur present. Pulmonary:      Effort: Pulmonary effort is normal.      Breath sounds: Normal breath sounds. No wheezing or rales.    Abdominal:      General: Bowel sounds are normal. There is no distension. Palpations: Abdomen is soft. Tenderness: There is no abdominal tenderness. Lymphadenopathy:      Cervical: No cervical adenopathy. Skin:     General: Skin is warm and dry. Findings: No rash. Neurological:      Mental Status: He is alert and oriented to person, place, and time. Psychiatric:         Behavior: Behavior normal.         Thought Content: Thought content normal.         Judgment: Judgment normal.         ASSESSMENT/PLAN:  Encounter Diagnoses   Name Primary?     Type 2 diabetes mellitus with stage 3 chronic kidney disease, without long-term current use of insulin (HCC) Yes    Dyslipidemia     Essential hypertension     Dilated cardiomyopathy (HCC)     Current moderate episode of major depressive disorder without prior episode ([facility] [address]     Acute on chronic combined systolic and diastolic congestive heart failure (HCC)     Peripheral polyneuropathy     Gastroesophageal reflux disease without esophagitis     Chronic kidney disease [facility]CKD), stage III (moderate) [facility])     Restless leg syndrome     Hypokalemia     Need for shingles vaccine     Routine general medical examination at a health care facility     Screening for cardiovascular condition      Orders Placed This Encounter   Medications    zoster recombinant adjuvanted vaccine (SHINGRIX) 50 MCG/0.5ML SUSR injection     Sig: Inject 0.5 mLs into the muscle once for 1 dose     Dispense:  0.5 mL     Refill:  1    carvedilol (COREG) 3.125 MG tablet     Sig: Take 1 tablet by mouth 2 times daily (with meals)     Dispense:  180 tablet     Refill:  1    furosemide (LASIX) 40 MG tablet     Sig: Take 1 tablet by mouth 2 times daily     Dispense:  180 tablet     Refill:  1    PARoxetine (PAXIL) 40 MG tablet     Sig: TAKE 1 TABLET BY MOUTH ONCE DAILY     Dispense:  90 tablet     Refill:  1    glimepiride (AMARYL) 1 MG tablet     Sig: Take 1 tablet by mouth every morning     Dispense:  90 tablet     Refill:  1     Orders Placed This Encounter   Procedures    CBC Auto Differential     Standing Status:   Future     Standing Expiration Date:   6/11/2021    Comprehensive Metabolic Panel     Standing Status:   Future     Standing Expiration Date:   6/11/2021    Hemoglobin A1C     Standing Status:   Future     Standing Expiration Date:   6/11/2021    Lipid Panel     Standing Status:   Future     Standing Expiration Date:   6/11/2021     Order Specific Question:   Is Patient Fasting?/# of Hours     Answer:   12 hours    NH Intens behave ther cardio dx, 15 minutes []     Did add glimepiride to his daily regimen. Did discuss with him the need for low-carb/low sugar diet and increased exercise in order to keep his blood sugars optimally controlled. Patient is to increase his potassium pills to 2 pills daily due to the hypokalemia. Patient is to continue on the rest of his current medical therapy. No additional changes are made at this time. Patient is to return to my office in 6 months duration or sooner if any further problems or symptoms arise. (Please note that portions of this note were completed with a voice recognition program. Efforts were made to edit the dictations but occasionally words are mis-transcribed.)        Return in about 6 months (around 12/11/2020). An electronic signature was used to authenticate this note.     --Saúl Mack,  on 6/11/2020 at 2:47 PM

## 2020-07-21 NOTE — PATIENT INSTRUCTIONS
Preventing the Spread of Coronavirus Disease 2019 in Homes and Residential Communities   For the most recent information go to [email or web address]    Prevention steps for People with confirmed or suspected COVID-19 (including persons under investigation) who do not need to be hospitalized  and   People with confirmed COVID-19 who were hospitalized and determined to be medically stable to go home    Your healthcare provider and public health staff will evaluate whether you can be cared for at home. If it is determined that you do not need to be hospitalized and can be isolated at home, you will be monitored by staff from your local or state health department. You should follow the prevention steps below until a healthcare provider or local or state health department says you can return to your normal activities. Stay home except to get medical care  People who are mildly ill with COVID-19 are able to isolate at home during their illness. You should restrict activities outside your home, except for getting medical care. Do not go to work, school, or public areas. Avoid using public transportation, ride-sharing, or taxis. Separate yourself from other people and animals in your home  People: As much as possible, you should stay in a specific room and away from other people in your home. Also, you should use a separate bathroom, if available. Animals: You should restrict contact with pets and other animals while you are sick with COVID-19, just like you would around other people. Although there have not been reports of pets or other animals becoming sick with COVID-19, it is still recommended that people sick with COVID-19 limit contact with animals until more information is known about the virus. When possible, have another member of your household care for your animals while you are sick.  If you are sick with COVID-19, avoid contact with your pet, including petting, snuggling, being kissed or licked, and sharing food. If you must care for your pet or be around animals while you are sick, wash your hands before and after you interact with pets and wear a facemask. Call ahead before visiting your doctor  If you have a medical appointment, call the healthcare provider and tell them that you have or may have COVID-19. This will help the healthcare providers office take steps to keep other people from getting infected or exposed. Wear a facemask  You should wear a facemask when you are around other people (e.g., sharing a room or vehicle) or pets and before you enter a healthcare providers office. If you are not able to wear a facemask (for example, because it causes trouble breathing), then people who live with you should not stay in the same room with you, or they should wear a facemask if they enter your room. Cover your coughs and sneezes  Cover your mouth and nose with a tissue when you cough or sneeze. Throw used tissues in a lined trash can. Immediately wash your hands with soap and water for at least 20 seconds or, if soap and water are not available, clean your hands with an alcohol-based hand  that contains at least 60% alcohol. Clean your hands often  Wash your hands often with soap and water for at least 20 seconds, especially after blowing your nose, coughing, or sneezing; going to the bathroom; and before eating or preparing food. If soap and water are not readily available, use an alcohol-based hand  with at least 60% alcohol, covering all surfaces of your hands and rubbing them together until they feel dry. Soap and water are the best option if hands are visibly dirty. Avoid touching your eyes, nose, and mouth with unwashed hands. Avoid sharing personal household items  You should not share dishes, drinking glasses, cups, eating utensils, towels, or bedding with other people or pets in your home.  After using these items, they should be washed thoroughly with soap and water. Clean all high-touch surfaces everyday  High touch surfaces include counters, tabletops, doorknobs, bathroom fixtures, toilets, phones, keyboards, tablets, and bedside tables. Also, clean any surfaces that may have blood, stool, or body fluids on them. Use a household cleaning spray or wipe, according to the label instructions. Labels contain instructions for safe and effective use of the cleaning product including precautions you should take when applying the product, such as wearing gloves and making sure you have good ventilation during use of the product. Monitor your symptoms  Seek prompt medical attention if your illness is worsening (e.g., difficulty breathing). Before seeking care, call your healthcare provider and tell them that you have, or are being evaluated for, COVID-19. Put on a facemask before you enter the facility. These steps will help the healthcare providers office to keep other people in the office or waiting room from getting infected or exposed. Ask your healthcare provider to call the local or [facility] health department. Persons who are placed under active monitoring or facilitated self-monitoring should follow instructions provided by their local health department or occupational health professionals, as appropriate. When working with your local health department check their available hours. If you have a medical emergency and need to call 911, notify the dispatch personnel that you have, or are being evaluated for COVID-19. If possible, put on a facemask before emergency medical services arrive. Discontinuing home isolation  Patients with confirmed COVID-19 should remain under home isolation precautions until the risk of secondary transmission to others is thought to be low.  The decision to discontinue home isolation precautions should be made on a case-by-case basis, in consultation with healthcare providers and state and [facility] health departments.

## 2020-07-21 NOTE — PROGRESS NOTES
[facility]             [address] [place] [address] [place], [address]                        Telephone [phone]             Fax [phone]       Yeni España  1940  MRN:  [id number]  Date of visit:  7/21/2020     Subjective:    Yeni España is a 79 y.o.  male who presents to [facility] today (7/21/2020) for evaluation of: Other (exposed to positive covid PT, SOB chronic but getting worse)      He was at a gathering on 7/11/2020. He learned today that another person who was at the gathering tested positive for Covid-19. He denies cough, or fever. He states that she has not had nausea, abdominal pain, eye symptoms, rash, or change in sense of taste or smell. He states that he is a little more short of breath than usual.        Current medications are:  Current Outpatient Medications   Medication Sig Dispense Refill    carvedilol (COREG) 3.125 MG tablet Take 1 tablet by mouth 2 times daily (with meals) 180 tablet 1    furosemide (LASIX) 40 MG tablet Take 1 tablet by mouth 2 times daily 180 tablet 1    PARoxetine (PAXIL) 40 MG tablet TAKE 1 TABLET BY MOUTH ONCE DAILY 90 tablet 1    glimepiride (AMARYL) 1 MG tablet Take 1 tablet by mouth every morning 90 tablet 1    potassium chloride (KLOR-CON M) 20 MEQ extended release tablet Take 1 tablet by mouth 2 times daily 180 tablet 1    buPROPion (WELLBUTRIN XL) 300 MG extended release tablet Take 1 tablet by mouth every morning 90 tablet 1    gabapentin (NEURONTIN) 600 MG tablet Take 1 tablet by mouth 2 times daily for 90 days.  180 tablet 0    loratadine (CLARITIN) 10 MG tablet Take 1 tablet by mouth daily 90 tablet 1    digoxin (LANOXIN) 250 MCG tablet Take 250 mcg by mouth daily      dicyclomine (BENTYL) 10 MG capsule Take 1 capsule by mouth 4 times daily as needed (bowel spasms or abdominal pain) 60 capsule 1    HYDROcodone-acetaminophen (NORCO) 5-325 MG per tablet Take 2 tablets by mouth every morning.  pantoprazole (PROTONIX) 40 MG tablet Take 40 mg by mouth daily      triamcinolone (KENALOG) 0.1 % cream Apply topically 2 times daily as needed      tiZANidine (ZANAFLEX) 2 MG tablet Take 2 mg by mouth nightly as needed      ondansetron (ZOFRAN) 4 MG tablet Take 1 tablet by mouth 3 times daily as needed for Nausea or Vomiting 30 tablet 0    Cyanocobalamin (VITAMIN B-12) 5000 MCG TBDP Take 1 tablet by mouth daily       Multiple Vitamins-Minerals (MULTIVITAMIN PO) Take 1 tablet by mouth daily.  calcium carbonate 600 MG TABS tablet Take 1 tablet by mouth daily.  aspirin 81 MG tablet Take 81 mg by mouth daily. No current facility-administered medications for this visit. He has No Known Allergies. He has the following problem list:  Patient Active Problem List   Diagnosis    Dilated cardiomyopathy ([facility] [address]    Anemia    Peripheral neuropathy    [facility]CK[facility] stage III (moderate) ([facility])    Insomnia    Benign prostatic hyperplasia    Diabetes mellitus, type II ([facility] [address]    Restless leg syndrome    Obstructive sleep apnea    HTN (hypertension)    Coronary artery disease    Anxiety    Depression    GERD (gastroesophageal reflux disease)    Allergic rhinitis    Abdominal pain    Diabetes ([facility] [address]    Chronic kidney disease (CKD)    Dyslipidemia    Morbid obesity with BMI of 40.0-44.9, adult (HCC)    Congestive heart failure (HCC)    Bilateral lower extremity edema    Acute on chronic systolic congestive heart failure (HCC)    Pneumonia due to organism    Heart failure (HCC)    Primary osteoarthritis of right hip    Chronic right hip pain        He  reports that he quit smoking about 41 years ago. His smoking use included cigarettes. He has a 20.00 pack-year smoking history.  He has never used smokeless tobacco.      Objective:    Vitals:    07/21/20 1016   BP: 110/70   Pulse: 64   Resp: 18   Temp: 93.6 °F (34.2

## 2020-07-23 NOTE — TELEPHONE ENCOUNTER
Kayden Andrews called and stated that the Patient has gained 5 lbs in a week and is SOB. She states that usually [facility] usually increases the patients water pill. Please advise and patients daughter Magno Martines to fill her in.

## 2020-09-01 PROBLEM — I48.91 ATRIAL FIBRILLATION (HCC): Status: ACTIVE | Noted: 2020-01-01

## 2020-09-01 PROBLEM — E66.01 MORBID OBESITY WITH BMI OF 40.0-44.9, ADULT (HCC): Status: RESOLVED | Noted: 2018-12-19 | Resolved: 2020-01-01

## 2020-09-01 PROBLEM — G63 POLYNEUROPATHY ASSOCIATED WITH UNDERLYING DISEASE (HCC): Status: ACTIVE | Noted: 2020-01-01

## 2020-09-01 NOTE — PROGRESS NOTES
2020     Vini De Los Santos (:  1940) is a 79 y.o. male, here for evaluation of the following medical concerns:    HPI  Patient comes in today secondary to increased weight gain and shortness of breath. Patient states couple weeks ago when it was really hot he had started to retain a little bit more fluid and just has not been able to get rid of it since then. Is having some slight increase in shortness of breath. Does have a known history of chronic combined systolic and diastolic CHF and with recent echo per his cardiologist his ejection fraction is less than 25%. Has not noticed significant increase in his chronic lower extremity edema. Does not have any chest pain or palpitations. Just wanted to get rid of the extra fluid before it became problematic. He is on Lasix 40 mg twice daily and is compliant with its use. Also has a known history of polyneuropathy of the extremities which is stable at this time without concern and does continue on chronic gabapentin therapy to help control his pain. Is stable with this medical therapy. He has a known history of atrial fibrillation which is stable and controlled on his current medical regimen. Rate is stable at this time. Patient otherwise has no other acute medical concerns to discuss. Did review patient's med list, allergies, social history, fam history, pmhx and pshx today as noted in the record. Preventative measures are reviewed today. See health maintenance section for complete preventative plan of care. Review of Systems   Constitutional: Negative for chills, fatigue and fever. HENT: Negative. Eyes: Negative. Respiratory: Positive for shortness of breath. Negative for cough, chest tightness and wheezing. Cardiovascular: Positive for leg swelling (chronic and unchanged). Negative for chest pain and palpitations. Gastrointestinal: Negative. Musculoskeletal: Positive for myalgias. Negative for back pain.    Skin: Negative. Neurological: Negative for weakness. Prior to Visit Medications    Medication Sig Taking? Authorizing Provider   metOLazone (ZAROXOLYN) 2.5 MG tablet Take 1 pill every other day 30 minutes prior to first lasix dose Yes Suyapa Samuel, DO   carvedilol (COREG) 3.125 MG tablet Take 1 tablet by mouth 2 times daily (with meals) Yes Suyapa Samuel, DO   furosemide (LASIX) 40 MG tablet Take 1 tablet by mouth 2 times daily Yes Suyapa Samuel, DO   PARoxetine (PAXIL) 40 MG tablet TAKE 1 TABLET BY MOUTH ONCE DAILY Yes Suyapa Samuel, DO   glimepiride (AMARYL) 1 MG tablet Take 1 tablet by mouth every morning Yes Suyapa Samuel, DO   potassium chloride (KLOR-CON M) 20 MEQ extended release tablet Take 1 tablet by mouth 2 times daily Yes Suyapa Samuel, DO   buPROPion (WELLBUTRIN XL) 300 MG extended release tablet Take 1 tablet by mouth every morning Yes Suyapa Samuel, DO   gabapentin (NEURONTIN) 600 MG tablet Take 1 tablet by mouth 2 times daily for 90 days. Yes Suyapa Samuel, DO   loratadine (CLARITIN) 10 MG tablet Take 1 tablet by mouth daily Yes Suyapa Samuel, DO   digoxin (LANOXIN) 250 MCG tablet Take 250 mcg by mouth daily Yes Historical Provider, MD   dicyclomine (BENTYL) 10 MG capsule Take 1 capsule by mouth 4 times daily as needed (bowel spasms or abdominal pain) Yes Suyapa Samuel, DO   HYDROcodone-acetaminophen (NORCO) 5-325 MG per tablet Take 2 tablets by mouth every morning.  Yes Historical Provider, MD   pantoprazole (PROTONIX) 40 MG tablet Take 40 mg by mouth daily Yes Historical Provider, MD   triamcinolone (KENALOG) 0.1 % cream Apply topically 2 times daily as needed Yes Historical Provider, MD   tiZANidine (ZANAFLEX) 2 MG tablet Take 2 mg by mouth nightly as needed Yes Historical Provider, MD   ondansetron (ZOFRAN) 4 MG tablet Take 1 tablet by mouth 3 times daily as needed for Nausea or Vomiting Yes Suyapa Samuel, DO   Cyanocobalamin (VITAMIN B-12) 5000 MCG TBDP Take 1 tablet by mouth daily  Yes Historical Provider, MD   Multiple Vitamins-Minerals (MULTIVITAMIN PO) Take 1 tablet by mouth daily. Yes Historical Provider, MD   calcium carbonate 600 MG TABS tablet Take 1 tablet by mouth daily. Yes Historical Provider, MD   aspirin 81 MG tablet Take 81 mg by mouth daily. Yes Historical Provider, MD        Social History     Tobacco Use    Smoking status: Former Smoker     Packs/day: 1.00     Years: 20.00     Pack years: 20.00     Types: Cigarettes     Last attempt to quit: 1979     Years since quittin.3    Smokeless tobacco: Never Used    Tobacco comment: damien rrt 19   Substance Use Topics    Alcohol use: No     Alcohol/week: 0.0 standard drinks     Frequency: Never        Vitals:    20 0842   BP: 106/60   Site: Left Upper Arm   Position: Sitting   Cuff Size: Large Adult   Pulse: 80   Resp: 20   SpO2: 98%   Weight: 245 lb 3.2 oz (111.2 kg)   Height: 6' (1.829 m)     Estimated body mass index is 33.26 kg/m² as calculated from the following:    Height as of this encounter: 6' (1.829 m). Weight as of this encounter: 245 lb 3.2 oz (111.2 kg). Physical Exam  Vitals signs and nursing note reviewed. Constitutional:       General: He is not in acute distress. Appearance: He is well-developed. He is not diaphoretic. HENT:      Head: Normocephalic and atraumatic. Eyes:      Conjunctiva/sclera: Conjunctivae normal.   Neck:      Musculoskeletal: Normal range of motion. Cardiovascular:      Rate and Rhythm: Normal rate and regular rhythm. Heart sounds: Murmur present. Pulmonary:      Effort: Pulmonary effort is normal.      Breath sounds: Normal breath sounds. No wheezing, rhonchi or rales. Musculoskeletal: Normal range of motion. General: Swelling (chronic 2+ pitting edema to the bilateral lower extremities. Unchanged from previous checks. Has negative Alina's sign) present. No tenderness.    Skin:     General: Skin is warm and dry. Coloration: Skin is not pale. Findings: No erythema or rash. Neurological:      Mental Status: He is alert and oriented to person, place, and time. Psychiatric:         Behavior: Behavior normal.         Thought Content: Thought content normal.         Judgment: Judgment normal.         ASSESSMENT/PLAN:  Encounter Diagnoses   Name Primary?  Acute on chronic combined systolic and diastolic congestive heart failure (HCC) Yes    Polyneuropathy associated with underlying disease ([facility]     Atrial fibrillation, unspecified type ([facility]     Chronic kidney disease (CKD), stage III (moderate) (HCC)        Orders Placed This Encounter   Medications    metOLazone (ZAROXOLYN) 2.5 MG tablet     Sig: Take 1 pill every other day 30 minutes prior to first lasix dose     Dispense:  10 tablet     Refill:  0     No orders of the defined types were placed in this encounter. At this point we will add Zaroxolyn 2.5 mg every other day prior to his first Lasix dose until his weight returns back to baseline. Did advise him once he gets back to his baseline weight he can go ahead and stop this medical therapy. Would try to utilize the least amount possible due to his known chronic kidney disease. Patient is to continue on the rest of his current medical therapy. No additional changes are made at this time. Patient is to return to my office as scheduled for his routine medical follow-up visit or sooner if any further problems or symptoms arise. (Please note that portions of this note were completed with a voice recognition program. Efforts were made to edit the dictations but occasionally words are mis-transcribed.)        No follow-ups on file. An electronic signature was used to authenticate this note.     --Zohreh June DO on 9/1/2020 at 9:16 AM

## 2020-09-08 NOTE — TELEPHONE ENCOUNTER
Controlled substances monitoring: No signs of potential drug abuse or diversion identified when the OAR[place] report from [address] [place] [address], [place], and [place] was reviewed today. The activity on the report was consistent with the treatment plan.

## 2020-09-24 NOTE — TELEPHONE ENCOUNTER
Zohra Juares called requesting a refill of the below medication which has been pended for you: script last filled 9/8/2020 #60    Requested Prescriptions     Pending Prescriptions Disp Refills    gabapentin (NEURONTIN) 600 MG tablet [Pharmacy Med Name: Gabapentin 600 MG Oral Tablet] 60 tablet 0     Sig: Take 1 tablet by mouth twice daily       Last Appointment Date: 7/21/2020  Next Appointment Date:12/14/2020    No Known Allergies

## 2020-10-19 NOTE — ED PROVIDER NOTES
[facility] ED  [address]  [place] [address] [address]  Phone: [phone]  [place]      Pt Name: Therese Garcia  MRN: [id number]  [place] 1940  Date of evaluation: 10/19/2020    CHIEF COMPLAINT       Chief Complaint   Patient presents with    Shortness of Breath       HISTORY OF PRESENT ILLNESS    Therese Garcia is a 79 y.o. male who presents to the emergency room complaining worsening shortness of breath over the past 2 to 3 days. 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 He has a history of congestive heart failure he has been taking his Lasix. He has had chronic shortness of breath but worse over the past few days. No fevers or chills. Denies any chest pain. No other acute symptoms.   He also brings up chronic knee pain and left shoulder pain    REVIEW OF SYSTEMS       Constitutional: No fevers or chills   HEENT: No sore throat, rhinorrhea, or earache   Eyes: No blurry vision or double vision no drainage   Cardiovascular: No chest pain or tachycardia   Respiratory: No wheezing positive shortness of breath and cough  Gastrointestinal: No nausea, vomiting, diarrhea, constipation, or abdominal pain   : No hematuria or dysuria   Musculoskeletal: Chronic left shoulder and bilateral knee pain positive bilateral lower extremity edema  Skin: No rash   Neurological: No focal neurologic complaints, paresthesias, weakness, or headache     PAST MEDICAL HISTORY    has a past medical history of A-fib ([address] Abnormal PSA, Allergic rhinitis, Anemia, Arthritis, Benign prostatic hypertrophy, CAD (coronary artery disease), CHF (congestive heart failure) ([address] Depression with anxiety, Dilated cardiomyopathy ([address] GERD (gastroesophageal reflux disease), History of blood transfusion, Hypertension, Insomnia, Mass, Obstructive sleep apnea, Peripheral neuropathy, Renal insufficiency, Restless leg syndrome, Type II or unspecified type diabetes mellitus without mention of complication, not stated as uncontrolled, and Ventricular tachycardia ([facility] SURGICAL HISTORY      has a past surgical history that includes Gastric bypass surgery (1996); Tonsillectomy; ventral hernia repair (1997); pacemaker placement (4/2009); Prostate biopsy (10/28/2009); fine needle aspiration (12/13/2012); other surgical history (03/10/2014); Upper gastrointestinal endoscopy (04/24/2014); Colonoscopy (4/24/2014); Cataract removal with implant (Bilateral, 4/2014); Hydrocele surgery (Right); and pacemaker placement (02/25/2020). CURRENT MEDICATIONS       Previous Medications    ASPIRIN 81 MG TABLET    Take 81 mg by mouth daily. BUPROPION (WELLBUTRIN XL) 300 MG EXTENDED RELEASE TABLET    Take 1 tablet by mouth every morning    CALCIUM CARBONATE 600 MG TABS TABLET    Take 1 tablet by mouth daily. CARVEDILOL (COREG) 3.125 MG TABLET    Take 1 tablet by mouth 2 times daily (with meals)    CYANOCOBALAMIN (VITAMIN B-12) 5000 MCG TBDP    Take 1 tablet by mouth daily     DICYCLOMINE (BENTYL) 10 MG CAPSULE    Take 1 capsule by mouth 4 times daily as needed (bowel spasms or abdominal pain)    DIGOXIN (LANOXIN) 250 MCG TABLET    Take 250 mcg by mouth daily    EQ ALLERGY RELIEF 10 MG TABLET    Take 1 tablet by mouth once daily    FUROSEMIDE (LASIX) 40 MG TABLET    Take 1 tablet by mouth 2 times daily    GABAPENTIN (NEURONTIN) 600 MG TABLET    Take 1 tablet by mouth twice daily    GLIMEPIRIDE (AMARYL) 1 MG TABLET    Take 1 tablet by mouth every morning    HYDROCODONE-ACETAMINOPHEN (NORCO) 5-325 MG PER TABLET    Take 2 tablets by mouth every morning. METOLAZONE (ZAROXOLYN) 2.5 MG TABLET    Take 1 pill every other day 30 minutes prior to first lasix dose    MULTIPLE VITAMINS-MINERALS (MULTIVITAMIN PO)    Take 1 tablet by mouth daily.     ONDANSETRON (ZOFRAN) 4 MG TABLET    Take 1 tablet by mouth 3 times daily as needed for Nausea or Vomiting    PANTOPRAZOLE Bilateral knee x-rays and left shoulder x-ray. DIAGNOSTIC RESULTS     EKG: All EKG's are interpreted by the Emergency Department Physician who either signs or Co-signs this chart in the absence of a cardiologist.    Wale Youssef paced rhythm rate 96 DC undetectable   no ST elevations. Appropriate discordance.     Not indicated unless otherwise documented above    LABS:  Results for orders placed or performed during the hospital encounter of 71/68/61   Basic Metabolic Panel   Result Value Ref Range    Glucose 128 (H) 70 - 99 mg/dL    BUN 39 (H) 8 - 23 mg/dL    CREATININE 2.04 (H) 0.70 - 1.20 mg/dL    Bun/Cre Ratio 19 9 - 20    Calcium 7.9 (L) 8.6 - 10.4 mg/dL    Sodium 140 135 - 144 mmol/L    Potassium 3.8 3.7 - 5.3 mmol/L    Chloride 103 98 - 107 mmol/L    CO2 29 20 - 31 mmol/L    Anion Gap 8 (L) 9 - 17 mmol/L    GFR Non-African American 32 (L) >60 mL/min    GFR  38 (L) >60 mL/min    GFR Comment          GFR Staging NOT REPORTED    CBC Auto Differential   Result Value Ref Range    WBC 4.7 3.5 - 11.3 k/uL    RBC 3.60 (L) 4.21 - 5.77 m/uL    Hemoglobin 9.7 (L) 13.0 - 17.0 g/dL    Hematocrit 32.7 (L) 40.7 - 50.3 %    MCV 90.8 82.6 - 102.9 fL    MCH 26.9 25.2 - 33.5 pg    MCHC 29.7 25.2 - 33.5 g/dL    RDW 14.9 (H) 11.8 - 14.4 %    Platelets 001 959 - 991 k/uL    MPV 9.4 8.1 - 13.5 fL    NRBC Automated 0.0 0.0 per 100 WBC    Differential Type NOT REPORTED     Seg Neutrophils PENDING %    Lymphocytes PENDING %    Monocytes PENDING %    Eosinophils % PENDING %    Basophils PENDING %    Immature Granulocytes PENDING 0 %    Segs Absolute PENDING k/uL    Absolute Lymph # PENDING k/uL    Absolute Mono # PENDING k/uL    Absolute Eos # PENDING k/uL    Basophils Absolute PENDING 0.0 - 0.2 k/uL    Absolute Immature Granulocyte PENDING 0.00 - 0.30 k/uL    WBC Morphology NOT REPORTED     RBC Morphology NOT REPORTED     Platelet Estimate NOT REPORTED    Troponin   Result Value Ref Range    Troponin, High Sensitivity PENDING 0 - 22 ng/L    Troponin T NOT REPORTED <0.03 ng/mL    Troponin Interp NOT REPORTED    D-Dimer, Quantitative   Result Value Ref Range    D-Dimer, Quant 3.32 (H) 0.00 - 0.59 mg/L FEU   EKG 12 Lead   Result Value Ref Range    Ventricular Rate 96 BPM    Atrial Rate 96 BPM    QRS Duration 184 ms    Q-T Interval 440 ms    QTc Calculation (Bazett) 555 ms    R Axis -151 degrees    T Axis 57 degrees       Not indicated unless otherwise documented above    RADIOLOGY:   I reviewed the radiologist interpretations:    XR SHOULDER LEFT (MIN 2 VIEWS)   Final Result   Severe glenohumeral osteoarthritis with calcific tendinosis         XR KNEE LEFT (3 VIEWS)   Final Result   Osteoarthritis with joint effusion         XR KNEE RIGHT (3 VIEWS)   Final Result   Tricompartment osteoarthritis with joint effusion and possible medial   calcified joint body         XR CHEST PORTABLE   Final Result   Pulmonary vascular congestion with large right pleural effusion             Not indicated unless otherwise documented above    EMERGENCY DEPARTMENT COURSE:     The patient was given the following medications:  Orders Placed This Encounter   Medications    furosemide (LASIX) injection 40 mg        Vitals:   -------------------------  /78   Pulse 104   Temp 98 °F (36.7 °C) (Tympanic)   Resp 24   Wt 244 lb (110.7 kg)   SpO2 97%   BMI 33.09 kg/m²     7 PM resting in no acute distress. Chest x-ray shows a large right pleural effusion new since January. Awaiting first troponin. Will obtain a Covid swab. BUN at his normal creatinine elevated. Elevated D-dimer however with elevated creatinine unable to obtain a CT of the chest will need a VQ scan.    7:30 PM care transferred awaiting second troponin and EKG patient will require admission. I have reviewed the disposition diagnosis with the patient and or their family/guardian. I have answered their questions and given discharge instructions.  They voiced understanding of these instructions and did not have any furtherquestions or complaints. CRITICAL CARE:    None    CONSULTS:    None    PROCEDURES:    None      OARRS Report if indicated             FINAL IMPRESSION      1. Pleural effusion          DISPOSITION/PLAN   DISPOSITION          CONDITION ON DISPOSITION: STABLE       PATIENT REFERRED TO:  No follow-up provider specified.     DISCHARGE MEDICATIONS:  New Prescriptions    No medications on file       (Please note that portions of thisnote were completed with a voice recognition program.  Efforts were made to edit the dictations but occasionally words are mis-transcribed.)    Darby Night,,   Attending Emergency Physician       Darby Madden [place]  10/19/20 0949

## 2020-10-19 NOTE — TELEPHONE ENCOUNTER
Omero [place] called requesting a refill of the below medication which has been pended for you:     Requested Prescriptions     Pending Prescriptions Disp Refills    EQ ALLERGY RELIEF 10 MG tablet [Pharmacy Med Name: EQ Allergy Relief 10 MG Oral Tablet] 30 tablet 5     Sig: Take 1 tablet by mouth once daily       Last Appointment Date: 7/21/2020  Next Appointment Date: Visit date not found    No Known Allergies

## 2020-10-20 PROBLEM — J18.9 PNEUMONIA DUE TO ORGANISM: Status: RESOLVED | Noted: 2019-01-01 | Resolved: 2020-01-01

## 2020-10-20 NOTE — FLOWSHEET NOTE
Thoracentesis completed at bedside. Pt tolerated well. No SOB or coughing noted. Dressing dry and intact.

## 2020-10-20 NOTE — PLAN OF CARE
Problem: DISCHARGE BARRIERS  Goal: Patient's continuum of care needs are met  Outcome: Met This Shift   Pt lives at home with his handicap daughter. Pt takes care of daughter and denies any discharge needs at present time.

## 2020-10-20 NOTE — CONSULTS
[facility]  In Patient Cardiology Consult             Date:   10/20/2020  Patient name: Cheryle Posey  Date of admission:  10/19/2020  5:42 PM  MRN:   [id number]  YOB: 1940    Reason for Admission: Shortness of breath, weight gain, swelling    CHIEF COMPLAINT: Shortness of breath, weight gain, swelling    History Obtained From:  Pt and chart    HISTORY OF PRESENT ILLNESS:    This is a 50-year-old male with history as below who presents due to worsening shortness of breath, weight gain, lower extremity edema. He states he is taking all his medications as prescribed. He has been noticing increasing shortness of breath, weight gain, lower extremity edema. In the emergency room he was found to be in acute on chronic diastolic and systolic heart failure. He was admitted for further work-up. He was found to have a large pleural effusion and there is consideration for possible thoracentesis. No chest pains, no palpitations. Known to us for:  Combined systolic and diastolic heart failure, chronic  ICD in situ. S/p upgrade to CRTD on 2/20 by [facility]  Mild aortic insufficiency. Mild mitral valve thickening. Mild to moderate mitral regurgitation. CKD    Past Medical History:   has a past medical history of A-fib ([address] Abdominal pain, Abnormal PSA, Allergic rhinitis, Anemia, Arthritis, Benign prostatic hypertrophy, CAD (coronary artery disease), CHF (congestive heart failure) ([address] Depression with anxiety, Dilated cardiomyopathy ([address] GERD (gastroesophageal reflux disease), History of blood transfusion, Hypertension, Insomnia, Mass, Obstructive sleep apnea, Peripheral neuropathy, Pneumonia due to organism, Renal insufficiency, Restless leg syndrome, Type II or unspecified type diabetes mellitus without mention of complication, not stated as uncontrolled, and Ventricular tachycardia ([address]     Past Surgical History:   has a past surgical history that includes Gastric bypass surgery (1996); Tonsillectomy; ventral hernia repair (1997); pacemaker placement (4/2009); Prostate biopsy (10/28/2009); fine needle aspiration (12/13/2012); other surgical history (03/10/2014); Upper gastrointestinal endoscopy (04/24/2014); Colonoscopy (4/24/2014); Cataract removal with implant (Bilateral, 4/2014); Hydrocele surgery (Right); and pacemaker placement (02/25/2020). Home Medications:    Prior to Admission medications    Medication Sig Start Date End Date Taking? Authorizing Provider   EQ ALLERGY RELIEF 10 MG tablet Take 1 tablet by mouth once daily 10/19/20  Yes Cassius Loge, DO   gabapentin (NEURONTIN) 600 MG tablet Take 1 tablet by mouth twice daily 10/7/20 1/5/21 Yes Cassius Loge, DO   carvedilol (COREG) 3.125 MG tablet Take 1 tablet by mouth 2 times daily (with meals) 6/11/20  Yes Cassius Loge, DO   furosemide (LASIX) 40 MG tablet Take 1 tablet by mouth 2 times daily 6/11/20  Yes Cassius Loge, DO   PARoxetine (PAXIL) 40 MG tablet TAKE 1 TABLET BY MOUTH ONCE DAILY 6/11/20  Yes Cassius Loge, DO   glimepiride (AMARYL) 1 MG tablet Take 1 tablet by mouth every morning 6/11/20  Yes Cassius Loge, DO   potassium chloride (KLOR-CON M) 20 MEQ extended release tablet Take 1 tablet by mouth 2 times daily 6/11/20  Yes Cassius Loge, DO   buPROPion (WELLBUTRIN XL) 300 MG extended release tablet Take 1 tablet by mouth every morning 5/29/20  Yes Cassius Loge, DO   HYDROcodone-acetaminophen (NORCO) 5-325 MG per tablet Take 2 tablets by mouth every morning.    Yes Historical Provider, MD   pantoprazole (PROTONIX) 40 MG tablet Take 40 mg by mouth daily   Yes Historical Provider, MD   triamcinolone (KENALOG) 0.1 % cream Apply topically 2 times daily as needed   Yes Historical Provider, MD   tiZANidine (ZANAFLEX) 2 MG tablet Take 2 mg by mouth nightly as needed   Yes Historical Provider, MD   ondansetron (ZOFRAN) 4 MG tablet Take 1 tablet by mouth 3 times daily as needed for Nausea or Vomiting 10/10/19  Yes Esteban Benitez DO   Cyanocobalamin (VITAMIN B-12) 5000 MCG TBDP Take 1 tablet by mouth daily    Yes Historical Provider, MD   Multiple Vitamins-Minerals (MULTIVITAMIN PO) Take 1 tablet by mouth daily. Yes Historical Provider, MD   calcium carbonate 600 MG TABS tablet Take 1 tablet by mouth daily. Yes Historical Provider, MD   aspirin 81 MG tablet Take 81 mg by mouth daily. Yes Historical Provider, MD   metOLazone (ZAROXOLYN) 2.5 MG tablet Take 1 pill every other day 30 minutes prior to first lasix dose 9/1/20   Esteban Benitez DO   digoxin (LANOXIN) 250 MCG tablet Take 250 mcg by mouth daily    Historical Provider, MD   dicyclomine (BENTYL) 10 MG capsule Take 1 capsule by mouth 4 times daily as needed (bowel spasms or abdominal pain) 12/11/19   Esteban Benitez DO       Allergies:  Patient has no known allergies. Social History:   reports that he quit smoking about 41 years ago. His smoking use included cigarettes. He has a 20.00 pack-year smoking history. He has never used smokeless tobacco. He reports that he does not drink alcohol or use drugs. Family History:   neg for early CAD    REVIEW OF SYSTEMS:    · Constitutional: there has been no unanticipated weight loss. There's been No change in energy level, No change in activity level. · Eyes: No visual changes or diplopia. No scleral icterus. · ENT: No Headaches, hearing loss or vertigo. No mouth sores or sore throat. · Cardiovascular: As HPI  · Respiratory: As HPI  · Gastrointestinal: No abdominal pain, appetite loss, blood in stools. No change in bowel or bladder habits. · Genitourinary: No dysuria, trouble voiding, or hematuria. · Musculoskeletal:  No gait disturbance, No weakness or joint complaints. · Integumentary: No rash or pruritis. · Neurological: No headache, diplopia, change in muscle strength, numbness or tingling.  No change in gait, balance, coordination, mood, affect, memory, mentation, behavior. · Psychiatric: No anxiety, or depression. · Endocrine: No temperature intolerance. No excessive thirst, fluid intake, or urination. No tremor. · Hematologic/Lymphatic: No abnormal bruising or bleeding, blood clots or swollen lymph nodes. · Allergic/Immunologic: No nasal congestion or hives. PHYSICAL EXAM:    Physical Examination:    /74   Pulse 64   Temp 97.2 °F (36.2 °C) (Tympanic)   Resp 18   Ht 6' (1.829 m)   Wt 240 lb 4.8 oz (109 kg)   SpO2 95%   BMI 32.59 kg/m²    Constitutional and General Appearance: alert, cooperative, no distress and appears stated age  HEENT: PERRL, no cervical lymphadenopathy. No masses palpable. Normal oral mucosa  Respiratory:  · Normal excursion and expansion without use of accessory muscles  · Resp Auscultation: Good respiratory effort. No for increased work of breathing. On auscultation: decreased throughout with crackles on left  Cardiovascular:  · The apical impulse is not displaced  · Heart tones are crisp and normal. irregular S1 and S2. Murmurs: none  · Jugular venous pulsation Normal  · The carotid upstroke is normal in amplitude and contour without delay or bruit  · Peripheral pulses are symmetrical and full   Abdomen:  · No masses or tenderness  · Bowel sounds present  Extremities:  ·  No Cyanosis or Clubbing  ·  Lower extremity edema: 1-2+   ·  Skin: Warm and dry  Neurological:  · Alert and oriented.   · Moves all extremities well  · No abnormalities of mood, affect, memory, mentation, or behavior are noted    DATA:    Diagnostics:      EKG:   Results for orders placed or performed during the hospital encounter of 10/19/20   EKG 12 Lead   Result Value Ref Range    Ventricular Rate 96 BPM    Atrial Rate 96 BPM    QRS Duration 184 ms    Q-T Interval 440 ms    QTc Calculation (Bazett) 555 ms    R Axis -151 degrees    T Axis 57 degrees    Narrative    Ventricular-paced rhythm  Biventricular pacemaker detected  Abnormal ECG Echo:  Results for orders placed or performed during the hospital encounter of 12/06/19   ECHO Complete 2D W Doppler W Color   Result Value Ref Range    Left Ventricular Ejection Fraction 25     LVEF MODALITY ECHO     Narrative    Transthoracic Echocardiography Report (TTE)     Patient Name [facility]       Date of Study             12/06/2019                Landon Barfield      Date of      1940  Gender                    Male   Birth      Age          [de-identified] year(s)  Race                            Room Number  0216        Height:                   72 inch, 182.88 cm      Corporate ID [id number]    Weight:                   262 pounds, 118.8 kg   #      Patient Acct [de-identified]   BSA:         2.39 m^2     BMI:       35.53 kg/m^2   #      MR #         [id number]     Sonographer               Carmen Mock [facility]      Accession #  [id number]   Interpreting Physician    [address] [place] [address]      Fellow                   Referring Nurse                            Practitioner      Interpreting             Referring Physician       Laura Maradiaga,   Fellow                                             DEFIANCE*     Type of Study      TTE procedure:2D Echocardiogram, M-Mode, Doppler, Color Doppler. Procedure Date  Date: 12/06/2019 Start: 03:23 PM    Study Location: [facility]  Technical Quality: Adequate visualization    History / Tech. Comments:  Procedure explained to patient. Patient Status: Inpatient    Height: 72 inches Weight: 262.01 pounds BSA: 2.39 m^2 BMI: 35.53 kg/m^2    CONCLUSIONS    Summary  Left ventricle is mildly dilated. Moderate left ventricular hypertrophy. Left ventricular systolic function is severely reduced. Left ventricular  ejection fraction 25 %. Evidence of diastolic dysfunction. Left atrium is severely dilated. Right atrial dilatation. Right ventricular dilatation with normal systolic function. Mild aortic insufficiency. Mild mitral valve thickening.   Mild to moderate mitral regurgitation. Mild tricuspid regurgitation. Estimated right ventricular systolic pressure  is 30 mmHg. Signature  ----------------------------------------------------------------------------   Electronically signed by Ric Bhatt RDCS(Sonographer) on 2019   03:55 PM  ----------------------------------------------------------------------------    ----------------------------------------------------------------------------   Electronically signed by Janak Valadez(Interpreting physician) on 2019   07:46 PM  ----------------------------------------------------------------------------  FINDINGS  Left Atrium  Left atrium is severely dilated. Left Ventricle  Left ventricle is mildly dilated. Moderate left ventricular hypertrophy. Left ventricular systolic function is severely reduced. Left ventricular ejection fraction 25 %. Evidence of diastolic dysfunction. Right Atrium  Right atrial dilatation. Right Ventricle  Right ventricular dilatation with normal systolic function. Mitral Valve  Mild mitral valve thickening. Mild to moderate mitral regurgitation. Aortic Valve  Aortic valve is sclerotic but opens well. Mild aortic insufficiency. Tricuspid Valve  Normal tricuspid valve leaflets. Mild tricuspid regurgitation. Estimated right ventricular systolic pressure is 30 mmHg. Pulmonic Valve  The pulmonic valve is normal in structure. Pericardial Effusion  No significant pericardial effusion is seen.     Miscellaneous  Aortic root dimension is at upper limit of normal.    M-mode / 2D Measurements & Calculations:      LVIDd:5.95 cm(3.7 - 5.6 cm)      Diastolic NNVYVU:173.4 ml   LVIDs:5.26 cm(2.2 - 4.0 cm)      Systolic HJSFWK:181.6 ml   IVSd:1.4 cm(0.6 - 1.1 cm)        Aortic Root:3.7 cm(2.0 - 3.7 cm)   LVPWd:1.4 cm(0.6 - 1.1 cm)       LA Dimension: 5.9 cm(1.9 - 4.0 cm)   Fractional Shortenin.6 %     LA volume/Index: 142 ml /59m^2   Calculated LVEF (%): 24.73 %      Mitral: Aortic      Peak E-Wave: 1.25 m/s                    Peak Velocity: 1.35 m/s                                            Peak Gradient: 7.29 mmHg   Peak Gradient: 6.25 mmHg   Deceleration Time: 130 msec      Tricuspid:                               Pulmonic:      Peak TR Velocity: 2.58 m/s               Peak Velocity: 0.84 m/s   Peak TR Gradient: 26.6256 mmHg           Peak Gradient: 2.84 mmHg     Septal Wall E' velocity:0.05 m/s  Lateral Wall E' velocity:0.04 m/s         Labs:     CBC:   Recent Labs     10/19/20  1846 10/20/20  0439   WBC 4.7 5.3   HGB 9.7* 9.8*   HCT 32.7* 33.0*    166     BMP:   Recent Labs     10/19/20  1846 10/20/20  0439    142   K 3.8 3.6*   CO2 29 25   BUN 39* 37*   CREATININE 2.04* 1.92*   LABGLOM 32* 34*   GLUCOSE 128* 158*     BNP: No results for input(s): BNP in the last 72 hours. PT/INR: No results for input(s): PROTIME, INR in the last 72 hours. APTT:No results for input(s): APTT in the last 72 hours.   CARDIAC ENZYMES:  Recent Labs     10/19/20  1846 10/19/20  2026 10/20/20  0439   TROPHS 56* 62* 62*     FASTING LIPID PANEL:  Lab Results   Component Value Date    HDL 20 06/09/2020    LDLCALC 61.8 12/06/2019    TRIG 90 06/09/2020     LIVER PROFILE:  Recent Labs     10/20/20  0439   LABALBU 3.6         IMPRESSION:    Patient Active Problem List   Diagnosis    Dilated cardiomyopathy ([address]    Anemia    Peripheral neuropathy    Chronic kidney disease (CKD), stage III (moderate)    Insomnia    Benign prostatic hyperplasia    Diabetes mellitus, type II (HCC)    Restless leg syndrome    Obstructive sleep apnea    HTN (hypertension)    Coronary artery disease    Anxiety    Depression    GERD (gastroesophageal reflux disease)    Allergic rhinitis    Diabetes ([address]    Chronic kidney disease (CKD)    Dyslipidemia    Congestive heart failure (HCC)    Bilateral lower extremity edema    Acute on chronic systolic congestive heart failure (HCC)    Heart failure ([facility]    Primary osteoarthritis of right hip    Chronic right hip pain    Polyneuropathy associated with underlying disease ([facility]    Atrial fibrillation (HCC)     Acute on chronic Combined systolic and diastolic heart failure  Pleural effusion due to above  ICD in situ. S/p upgrade to CRTD on 2/20 by [facility]  EF 15% on Echo 10/20  Afib- currently V paced  Mild AI on echo 10/20  Mod/severe MR on Echo 10/20  Moderate TR on Echo 10/20  CKD    RECOMMENDATIONS:  - agree with IV diuresis- change to po bumex prior to d/c  - needs better rate control  - stop coreg  - start toprol 100 mg po qd  - not on [facility] per primary cardiologist [facility] clinic cardiology), will defer long term AC to them  - no ACE/ARB/ARNi/Aldactone due to CKD    Discussed with patient and nursing. Thank you for allowing me to participate in the care of this patient, please do not hesitate to call if you have any questions. Jordan Clay DO, [facility], [address]  [email or web address]  [phone]

## 2020-10-20 NOTE — ED PROVIDER NOTES
[address] [facility] ED  [address]  Phone: [phone]        ADDENDUM:      Care of this patient was assumed from Dr. Chapito Aguilar. The patient was seen for Shortness of Breath  . The patient's initial evaluation and plan have been discussed with the prior provider who initially evaluated the patient. Nursing Notes, Past Medical Hx, Past Surgical Hx, Allergies, were all reviewed. PAST MEDICAL HISTORY    has a past medical history of A-fib ([address] Abnormal PSA, Allergic rhinitis, Anemia, Arthritis, Benign prostatic hypertrophy, CAD (coronary artery disease), CHF (congestive heart failure) ([address] Depression with anxiety, Dilated cardiomyopathy ([address] GERD (gastroesophageal reflux disease), History of blood transfusion, Hypertension, Insomnia, Mass, Obstructive sleep apnea, Peripheral neuropathy, Renal insufficiency, Restless leg syndrome, Type II or unspecified type diabetes mellitus without mention of complication, not stated as uncontrolled, and Ventricular tachycardia ([address] SURGICAL HISTORY      has a past surgical history that includes Gastric bypass surgery (1996); Tonsillectomy; ventral hernia repair (1997); pacemaker placement (4/2009); Prostate biopsy (10/28/2009); fine needle aspiration (12/13/2012); other surgical history (03/10/2014); Upper gastrointestinal endoscopy (04/24/2014); Colonoscopy (4/24/2014); Cataract removal with implant (Bilateral, 4/2014); Hydrocele surgery (Right); and pacemaker placement (02/25/2020). CURRENT MEDICATIONS       Previous Medications    ASPIRIN 81 MG TABLET    Take 81 mg by mouth daily. BUPROPION (WELLBUTRIN XL) 300 MG EXTENDED RELEASE TABLET    Take 1 tablet by mouth every morning    CALCIUM CARBONATE 600 MG TABS TABLET    Take 1 tablet by mouth daily.     CARVEDILOL (COREG) 3.125 MG TABLET    Take 1 tablet by mouth 2 times daily (with meals)    CYANOCOBALAMIN (VITAMIN B-12) 5000 MCG TBDP    Take 1 tablet by mouth daily DICYCLOMINE (BENTYL) 10 MG CAPSULE    Take 1 capsule by mouth 4 times daily as needed (bowel spasms or abdominal pain)    DIGOXIN (LANOXIN) 250 MCG TABLET    Take 250 mcg by mouth daily    EQ ALLERGY RELIEF 10 MG TABLET    Take 1 tablet by mouth once daily    FUROSEMIDE (LASIX) 40 MG TABLET    Take 1 tablet by mouth 2 times daily    GABAPENTIN (NEURONTIN) 600 MG TABLET    Take 1 tablet by mouth twice daily    GLIMEPIRIDE (AMARYL) 1 MG TABLET    Take 1 tablet by mouth every morning    HYDROCODONE-ACETAMINOPHEN (NORCO) 5-325 MG PER TABLET    Take 2 tablets by mouth every morning. METOLAZONE (ZAROXOLYN) 2.5 MG TABLET    Take 1 pill every other day 30 minutes prior to first lasix dose    MULTIPLE VITAMINS-MINERALS (MULTIVITAMIN PO)    Take 1 tablet by mouth daily. ONDANSETRON (ZOFRAN) 4 MG TABLET    Take 1 tablet by mouth 3 times daily as needed for Nausea or Vomiting    PANTOPRAZOLE (PROTONIX) 40 MG TABLET    Take 40 mg by mouth daily    PAROXETINE (PAXIL) 40 MG TABLET    TAKE 1 TABLET BY MOUTH ONCE DAILY    POTASSIUM CHLORIDE (KLOR-CON M) 20 MEQ EXTENDED RELEASE TABLET    Take 1 tablet by mouth 2 times daily    TIZANIDINE (ZANAFLEX) 2 MG TABLET    Take 2 mg by mouth nightly as needed    TRIAMCINOLONE (KENALOG) 0.1 % CREAM    Apply topically 2 times daily as needed       ALLERGIES     has No Known Allergies.       Diagnostic Results     EKG: All EKG's are interpreted by the Emergency Department Physician who either signs or Co-signs this chart in the absenceof a cardiologist.        William Ing:   Results for orders placed or performed during the hospital encounter of 23/88/99   Basic Metabolic Panel   Result Value Ref Range    Glucose 128 (H) 70 - 99 mg/dL    BUN 39 (H) 8 - 23 mg/dL    CREATININE 2.04 (H) 0.70 - 1.20 mg/dL    Bun/Cre Ratio 19 9 - 20    Calcium 7.9 (L) 8.6 - 10.4 mg/dL    Sodium 140 135 - 144 mmol/L    Potassium 3.8 3.7 - 5.3 mmol/L    Chloride 103 98 - 107 mmol/L    CO2 29 20 - 31 mmol/L    Anion Gap 8 (L) 9 - 17 mmol/L    GFR Non-African American 32 (L) >60 mL/min    GFR  38 (L) >60 mL/min    GFR Comment          GFR Staging NOT REPORTED    CBC Auto Differential   Result Value Ref Range    WBC 4.7 3.5 - 11.3 k/uL    RBC 3.60 (L) 4.21 - 5.77 m/uL    Hemoglobin 9.7 (L) 13.0 - 17.0 g/dL    Hematocrit 32.7 (L) 40.7 - 50.3 %    MCV 90.8 82.6 - 102.9 fL    MCH 26.9 25.2 - 33.5 pg    MCHC 29.7 25.2 - 33.5 g/dL    RDW 14.9 (H) 11.8 - 14.4 %    Platelets 108 804 - 822 k/uL    MPV 9.4 8.1 - 13.5 fL    NRBC Automated 0.0 0.0 per 100 WBC    Differential Type NOT REPORTED     WBC Morphology NOT REPORTED     RBC Morphology NOT REPORTED     Platelet Estimate NOT REPORTED     Monocytes 5 3 - 12 %    Lymphocytes 7 (L) 24 - 43 %    Seg Neutrophils 82 (H) 36 - 65 %    Eosinophils % 5 (H) 1 - 4 %    Basophils 1 0 - 2 %    Immature Granulocytes 0 0 %    Absolute Mono # 0.24 0.10 - 1.20 k/uL    Absolute Lymph # 0.33 (L) 1.10 - 3.70 k/uL    Segs Absolute 3.84 1.50 - 8.10 k/uL    Absolute Eos # 0.24 0.00 - 0.44 k/uL    Basophils Absolute 0.05 0.00 - 0.20 k/uL    Absolute Immature Granulocyte 0.00 0.00 - 0.30 k/uL    Morphology Platelet count adequate     Morphology ANISOCYTOSIS PRESENT    Troponin   Result Value Ref Range    Troponin, High Sensitivity 56 (HH) 0 - 22 ng/L    Troponin T NOT REPORTED <0.03 ng/mL    Troponin Interp NOT REPORTED    D-Dimer, Quantitative   Result Value Ref Range    D-Dimer, Quant 3.32 (H) 0.00 - 0.59 mg/L FEU   Brain Natriuretic Peptide   Result Value Ref Range    Pro-BNP 11,915 (H) <300 pg/mL    BNP Interpretation Pro-BNP Reference Range:    COVID-19    Specimen: Other   Result Value Ref Range    SARS-CoV-2          SARS-CoV-2, Rapid Not Detected Not Detected    Source . NASOPHARYNGEAL SWAB     SARS-CoV-2         TROPONIN   Result Value Ref Range    Troponin, High Sensitivity 57 (HH) 0 - 22 ng/L    Troponin T NOT REPORTED <0.03 ng/mL    Troponin Interp NOT REPORTED    EKG 12 Lead   Result Value Ref Range    Ventricular Rate 96 BPM    Atrial Rate 96 BPM    QRS Duration 184 ms    Q-T Interval 440 ms    QTc Calculation (Bazett) 555 ms    R Axis -151 degrees    T Axis 57 degrees       RADIOLOGY:  XR SHOULDER LEFT (MIN 2 VIEWS)   Final Result   Severe glenohumeral osteoarthritis with calcific tendinosis         XR KNEE LEFT (3 VIEWS)   Final Result   Osteoarthritis with joint effusion         XR KNEE RIGHT (3 VIEWS)   Final Result   Tricompartment osteoarthritis with joint effusion and possible medial   calcified joint body         XR CHEST PORTABLE   Final Result   Pulmonary vascular congestion with large right pleural effusion             RECENT VITALS:  BP: 111/78, Temp: 98 °F (36.7 °C), Pulse: 104, Resp: 24     ED Course     The patient was given the following medications:  Orders Placed This Encounter   Medications    furosemide (LASIX) injection 40 mg       Medical Decision Making               EMERGENCY DEPARTMENT COURSE:   Vitals:    Vitals:    10/19/20 1751   BP: 111/78   Pulse: 104   Resp: 24   Temp: 98 °F (36.7 °C)   TempSrc: Tympanic   SpO2: 97%   Weight: 244 lb (110.7 kg)     -------------------------  BP: 111/78, Temp: 98 °F (36.7 °C), Pulse: 104, Resp: 24      RE-EVALUATION:  .  Patient resting comfortably. Labs show an elevated. ProBNP chest x-ray confirms congestive heart failure. At this time and do feel needs to be admitted. I did speak with nurse practitioner on-call, who is agreeable        FINAL IMPRESSION      1. Pleural effusion    2. Diastolic congestive heart failure, unspecified HF chronicity ([facility]          DISPOSITION/PLAN   DISPOSITION Decision To Admit 10/19/2020 08:57:25 PM      CONDITION ON DISPOSITION:   Fair    PATIENT REFERRED TO:  No follow-up provider specified.     DISCHARGE MEDICATIONS:  New Prescriptions    No medications on file       (Please note that portions of this note were completed with a voicerecognition program.  Efforts were made to edit the dictations but occasionally words are mis-transcribed.)    Ma MD, F.A.C.E.P.   Attending Emergency Medicine Physician                    Edilma Cano MD  10/19/20 9051

## 2020-10-20 NOTE — PROGRESS NOTES
SW completed a Psychosocial Assessment for evaluation of patient's mental health, social status, and functional capacity within the community. Patient is a 79year old male admitted due to heart failure. Patient was alert, oriented, and engaging during assessment. Patient lives with his 62year old handicapped daughter. Patient discussed positive support from immediate family members. Patient uses a walker, cane, wheelchair, and grab bars as needed for DMEs. Patient denied using outside community services. SW provided supportive listening while patient discussed loss of his wife. Patient reports his wife passed, \" 3 years ago in February from Dementia. \"      Patient has PCP Shona Chairez, DO and denies any issues affording his medication. SW assessed ADLs and means of transportation. Patient states he is independent in his ADLs and able to drive. SW assessed if patient had food insecurity or needed financial assistance. Patient denies food insecurity or any financial concerns. Patient is a full code status at this time. Patient denies discharge needs or further services at this time. SW provided business card. SW will continue to monitor needs and assist as appropriate.        Electronically signed by SUSAN Wilks on 10/20/2020 at 12:39 PM

## 2020-10-20 NOTE — H&P
HOSPITALIST ADMISSION H&P      REASON FOR ADMISSION:  Shortness of breath -- acute on chronic combined CHF -- large right pleural effusion  ESTIMATED LENGTH OF STAY:>2 midnights, 3-4 days    ATTENDING/ADMITTING PHYSICIAN: Sandra Banerjee MD  PCP: Rachel Richards DO    HISTORY OF PRESENT ILLNESS:      The patient is a 79 y.o. male patient of Rachel Richards DO who presents from the ER with c/o gradually worsening shortness of breath over the past 3 days. He denies fevers, chills, chest pain, or AICD firing. He does report occasional nonproductive cough. He has chronic bilateral lower leg edema. He states he has been taking his lasix as prescribed. On 09/01/2020 he was started on metolazone 2.5 mg every other day prn in addition to his lasix, which he feels helped some initially but less so recently. History of chronic combined CHF and dilated cardiomyopathy. Last 2D echo showed EF 25% in 12/2019. In ER, EKG showed a v-paced rhythm, troponin 56 --> 57, proBNP 11,915. D. Dimer elevated at 3.32. chest xray impression: Pulmonary vascular congestion with large right pleural effusion. Patient also c/o various joint pains -- see xray results    CKD stage 3 -- stable -- baseline creat ~ 1.8    Hypothyroidism -- new --- TSH 5.99 -- synthroid initiated    Chronic anemia -- Hgb 9.7    Hypokalemia -- 3.6    Hypocalcemia -- corrected 8.1    DMII -- HgbA1C 7.9 on 06/11/2020    Hypertension -- stable     See below for additional PMH. Patient wjlh-zsctibzryn-trfzmqow-available records reviewed, including, but not limited to ER records, imaging results, lab results, office records, personal records, and OARRS -- no signs of abuse or diversion. Past Medical History:   Diagnosis Date    A-fib [facility]) 12/2/15    Dr Jackson Or Abnormal PSA     Managed by Dr. Alfreda Chase.     Allergic rhinitis     Anemia     Arthritis     Benign prostatic hypertrophy     CAD (coronary artery disease)     [facility]estive heart failure) ([facility] [address]     Depression with anxiety     Dilated cardiomyopathy (HCC)     Severe, with ejection fraction 20 to 25%. Most recent echocardiogram 12/9/2008, mild to moderate aortic insufficiency, mild mitral and tricuspid insufficiency. 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  Obstructive sleep apnea     Peripheral neuropathy     Renal insufficiency     chronic kidney disease  stage 3 sees dr Lori Cheney    Restless leg syndrome     Type II or unspecified type diabetes mellitus without mention of complication, not stated as uncontrolled     Ventricular tachycardia [facility])          Past Surgical History:   Procedure Laterality Date    CATARACT REMOVAL WITH IMPLANT Bilateral 4/2014    Dr Jeimy Chowdary  4/24/2014    with egd    FINE NEEDLE ASPIRATION  12/13/2012    Endoscopic ultrasound with fine needle aspiration of pancreatic mass. [facility]    with appendectomy and cholecystectomy    HYDROCELE EXCISION Right     age 20    OTHER SURGICAL HISTORY  03/10/2014    CRT-ICD PULSE GENERATOR REPLACEMENT WITH INTRAOPERATIVE DEFIBRILLATION THRESHOLD TESTING    PACEMAKER PLACEMENT  4/2009    Defibrillator with pacemaker placement and subsequent replacement.  PACEMAKER PLACEMENT  02/25/2020    Defibrillator with pacemaker placement replacement at [place] [address] [facility]  10/28/2009    Benign prostate.     TONSILLECTOMY      as a child    UPPER GASTROINTESTINAL ENDOSCOPY  04/24/2014    with colonoscopy   [address] ER    after gastric bypass surgery       Medications Prior to Admission:    Medications Prior to Admission: EQ ALLERGY RELIEF 10 MG tablet, Take 1 tablet by mouth once daily  gabapentin (NEURONTIN) 600 MG tablet, Take 1 tablet by mouth twice daily  carvedilol (COREG) 3.125 MG tablet, Take 1 tablet by mouth 2 times daily (with meals)  furosemide (LASIX) 40 MG tablet, Take 1 tablet by mouth 2 times daily  PARoxetine (PAXIL) 40 MG tablet, TAKE 1 TABLET BY MOUTH ONCE DAILY  glimepiride (AMARYL) 1 MG tablet, Take 1 tablet by mouth every morning  potassium chloride (KLOR-CON M) 20 MEQ extended release tablet, Take 1 tablet by mouth 2 times daily  buPROPion (WELLBUTRIN XL) 300 MG extended release tablet, Take 1 tablet by mouth every morning  HYDROcodone-acetaminophen (NORCO) 5-325 MG per tablet, Take 2 tablets by mouth every morning. pantoprazole (PROTONIX) 40 MG tablet, Take 40 mg by mouth daily  triamcinolone (KENALOG) 0.1 % cream, Apply topically 2 times daily as needed  tiZANidine (ZANAFLEX) 2 MG tablet, Take 2 mg by mouth nightly as needed  ondansetron (ZOFRAN) 4 MG tablet, Take 1 tablet by mouth 3 times daily as needed for Nausea or Vomiting  Cyanocobalamin (VITAMIN B-12) 5000 MCG TBDP, Take 1 tablet by mouth daily   Multiple Vitamins-Minerals (MULTIVITAMIN PO), Take 1 tablet by mouth daily. calcium carbonate 600 MG TABS tablet, Take 1 tablet by mouth daily. aspirin 81 MG tablet, Take 81 mg by mouth daily. metOLazone (ZAROXOLYN) 2.5 MG tablet, Take 1 pill every other day 30 minutes prior to first lasix dose  digoxin (LANOXIN) 250 MCG tablet, Take 250 mcg by mouth daily  dicyclomine (BENTYL) 10 MG capsule, Take 1 capsule by mouth 4 times daily as needed (bowel spasms or abdominal pain)    Allergies:    Patient has no known allergies. Social History:    reports that he quit smoking about 41 years ago. His smoking use included cigarettes. He has a 20.00 pack-year smoking history. He has never used smokeless tobacco. He reports that he does not drink alcohol or use drugs. Family History:   family history includes Diabetes in his mother; Heart Disease in his mother.     REVIEW OF SYSTEMS:  See HPI and problem list; otherwise no other new complaints with respect to eyes, ENT, neck, pulmonary, coronary, chest, GI, , endocrine, musculoskeletal, immune system/connective tissue disease, hematologic, neurologic, psychiatric, skin, lymphatics, or malignancies. Code status discussed with patient/family--wishes for Full Code at this time. PHYSICAL EXAM:  Vitals:  /74   Pulse 64   Temp 97.2 °F (36.2 °C) (Tympanic)   Resp 18   Ht 6' (1.829 m)   Wt 240 lb 4.8 oz (109 kg)   SpO2 95%   BMI 32.59 kg/m²     General: awake, alert and cooperative  HEENT: Mucosa Pink, Moist, EMOI, External nose normal, Normocephalic, Atraumatic and Neck with full ROM  Neck: Supple, Carotid Pulses Present, No Masses, Tenderness, Nodularity and No Lymphadenopathy  Chest/Lungs: with dyspnea with minimal exertion, Distant Breath Sounds and Respirations even and unlabored  Cardiac: Irregularly Irregular and Systolic Murmur Present  GI/Abdomen:  Bowel Sounds Present and Soft, Non-tender, without Guarding or Rebound Tenderness  : Not examined  Extremities/Musculoskeletal: BLE with edema and Generalized weakness  Skin: No Cyanosis, No rash and Skin warm and dry  Neuro: Alert and Oriented, to Person, to Time, to Place, to Situation and No Localizing Signs/Symptoms  Psychiatric: agitated at times and Normal mood and affect    LABS:    CBC with Differential:    Lab Results   Component Value Date    WBC 5.3 10/20/2020    RBC 3.65 10/20/2020    RBC 0-2 09/29/2019    HGB 9.8 10/20/2020    HCT 33.0 10/20/2020     10/20/2020    MCV 90.4 10/20/2020    MCH 26.8 10/20/2020    MCHC 29.7 10/20/2020    RDW 14.8 10/20/2020    NRBC 0 01/06/2019    LYMPHOPCT 7 10/19/2020    LYMPHOPCT 13.40 12/06/2019    LYMPHOPCT 13.40 12/06/2019    MONOPCT 5 10/19/2020    MONOPCT 3.543 12/06/2019    MONOPCT 3.543 12/06/2019    EOSPCT 7.144 12/06/2019    EOSPCT 7.144 12/06/2019    BASOPCT 1 10/19/2020    BASOPCT 1.704 12/06/2019    BASOPCT 1.704 12/06/2019    MONOSABS 0.24 10/19/2020    MONOSABS 0.2000 12/06/2019    MONOSABS 0.2000 12/06/2019 LYMPHSABS 0.33 10/19/2020    LYMPHSABS 0.7562 12/06/2019    LYMPHSABS 0.7562 12/06/2019    EOSABS 0.24 10/19/2020    EOSABS 0.4032 12/06/2019    EOSABS 0.4032 12/06/2019    BASOSABS 0.05 10/19/2020    DIFFTYPE NOT REPORTED 10/19/2020     CMP:    Lab Results   Component Value Date     10/20/2020    K 3.6 10/20/2020     10/20/2020    CO2 25 10/20/2020    BUN 37 10/20/2020    CREATININE 1.92 10/20/2020    CREATININE 1.4 12/18/2018    GFRAA 41 10/20/2020    AGRATIO 1.3 09/30/2019    LABGLOM 34 10/20/2020    GLUCOSE 158 10/20/2020    GLUCOSE 122 09/30/2019    PROT 6.2 06/11/2020    PROT 5.8 01/05/2019    LABALBU 3.6 06/11/2020    LABALBU 3.6 09/30/2019    CALCIUM 7.8 10/20/2020    BILITOT 0.35 06/11/2020    BILITOT NEGATIVE 09/29/2019    ALKPHOS 141 06/11/2020    AST 35 06/11/2020    ALT 31 06/11/2020       ASSESSMENT:      Patient Active Problem List   Diagnosis    Dilated cardiomyopathy ([address]    Anemia    Peripheral neuropathy    Chronic kidney disease (CKD), stage III (moderate)    Insomnia    Benign prostatic hyperplasia    Diabetes mellitus, type II (HCC)    Restless leg syndrome    Obstructive sleep apnea    HTN (hypertension)    Coronary artery disease    Anxiety    Depression    GERD (gastroesophageal reflux disease)    Allergic rhinitis    Abdominal pain    Diabetes ([address]    Chronic kidney disease (CKD)    Dyslipidemia    Congestive heart failure (HCC)    Bilateral lower extremity edema    Acute on chronic systolic congestive heart failure (HCC)    Pneumonia due to organism    Heart failure (HCC)    Primary osteoarthritis of right hip    Chronic right hip pain    Polyneuropathy associated with underlying disease ([address]    Atrial fibrillation ([address]     Patient crlx-ditbzwplph-mstppiua-available records reviewed, including, but not limited to,  ER reprots--labs--imaging--EKGs--2D ECHOs---office records---personal notes  Tabatha Wong.         80  WM  Dorene Kyle FP; Vince Walter,                             Cardiology--ovalle; tf Shavonne Alamo, Urology]                                                        FULL CODE   PACEMAKER-AICD--NO MRIs    HEPARIN subcutaneously    Anti-infectives:  none    CHF--acute-on-chronic systolic---10.19.2020        2D ECHO----10.20.2020--LA severely dilated---LV severely dilated---                          severely reduced LVSF--RA severely dilated---severe RV                          dilatation with reduced RVSF--pacemaker lead present----                          MAC MV thickening--moderate-to-severe MR--EWA but                          opens well--moderate TR----RVSP ~ 51 mm Hg---moderate                            pulmonary hypertension--AR mildly dilated at 3.8 cm---                           IVC increased diameter and impair or no inspiratory variation----                           DD----LVEF ~ 15%  Large right pleural effusion---1.19.2020        CXR--10.20.2020--large right pleural effusion        EKG---10.19.2020---ventricular paced rhythm--96        Left should x-ray---10.19.2020----severe glenohumeral OA--calcific tendinosis            Acute-on-chronic systolic---1.5.2019---due to cardiomyopathy            Acute-on-chronic systolic---12.6. [place] 2019--due to dilated cardiomyopathy           2D ECHO---12.6.2019-----severely dilated                         LA---moderate LVH--severely reduced LVSF--RA                         dilatation--RV dilatation--NRVSF---EWA but opens                        well---mild AI---mild-to-moderate MR--mild TR--                        RVSP ~ 30 mm Hg---LVEF ~ 25%           Acute-on-chronic systolic--3. 2.2019---1.18.2016            DUS---BLE---3. 1.2019--no DVT--limited by BLE edema            EKG---3. 1.2019--#2---ventricular paced rhythm--92            EKG---3. 1.2019--#1---ventricular paced rhythm--89  Severe dilated cardiomyopathy           2D ECHO----3. 4.2019---severe TRES----severely dilated LA-- LV upper limits normal--severely reduced LVSF--severely                    dilated RA--RV dilatation--NRVSF--trivial TR---mild EWA--                    trivial TR---RVSP ~ 38 mm Hg---aortic root mildly dilated                     4.1 cm---Grade II moderate DD----LVEF ~ 25%           2-D ECHO--12.10.2008--mild-to-moderate AI--mild MI-TI--                     LVEF ~ 20-25%           PACEMAKER--AICD              CRT-ICD pulse generator replacement--intraoperative                           defibrillation threshold testing--3.10.2014               Atrial-biventricular pacing--sensing--2009                Arrhythmia--ventricular tachycardia                         PVCs--bigeminy--underlying prior rhythm  BLE edema   ASCVD            MI ruled out---1.19.2016            2D ECHO---1.18.2016--TRES--moderate LVH--                          NRVSF--mild AR--ns TR--RVSP ~ 30 mm Hg--                           pacemaker--AICD wire present--Grade 2 DD---                           LVEF ~ 30%   Elevated d-dimer---10.20.2020               CTA chest--pulmonary--1/2 dose---10.20.2020--no PE---large right pleural effusion with RLL atelectasis---cytic enlargement of tail of pancreas--no change  Chronic increasing right hip pain--1.5.2020              CT right hip---1.6.2020--stable mild degenerative changes                            right hip with evidence of chondrocalcinosis---stable                             mildly enlarged prostate               X-ray--1.5.2020--pelvis--right hip--moderate bilateral DJD OA  Hypertension  Diabetes Mellitus Type 2  Hypothyroidism   Obstructive sleep apnea  CKD---Stage 3  GERD  Restless leg syndrome  Peripheral neuropathy  Depression--anxiety  Obesity  Hypocalcemia   Tobacco abuse--quit---5.1.1979   PMH:      BPH--abnormal PSA, diarrhea, 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,                  osteoarthritis--knees--shoulder, allergic rhinitis, insomnia, chest                  pain--1.24.2013---chest wall pain component--costochondritis,                  pancreatic biopsy--non-malignant by report, sinusitis, Influenza                 A--2016, acute-on-chronic respiratory distress---2016, chest                  pain---2016, hyperkalemia--2016, RLL--pneumonia---12.6.2019--                  acute--cardiomegaly--small right basilar effusion--right basilar                 infiltrate  PSH:       gastric bypass 1996--[place], appendectomy,                  cholecystectomy, re-attachment thumb, pancreatic biopsy--2012,                  tonsillectomy--childhood, B9 prostate biopsy--2009, central                  hernia repair--1997, EGD-colonoscopy--2014, bilateral                  cataract--IOL--2014    Allergies:  NKDA  Attending Supervising [de-identified] Attestation Statement  I performed a history and physical examination on the patient and discussed the management with the nurse practitioner. I reviewed and agree with the findings and plan as documented in her note . Electronically signed by Nik Vance on 10/20/20 at 1:16 PM EDT        PLAN:    1. Acute on chronic combined CHF -- IV diuresis, telemetry monitoring, ACS regimen, cardiology consult, 2D echo, daily weight, I&Os  2. Large right pleural effusion -- as above, consult for possible US guided thoracentesis, monitor   3. Elevated D. Dimer -- VQ scan pending due to renal function  4. Hypothyroidism, new -- initiated synthroid, will require outpatient follow up  5. Hypokalemia -- replacing, monitor  6. CKD stage 3 -- monitor I&O and renal function  7. Chronic anemia -- monitor  8. Hypocalcemia -- replacing, monitor  9. DMII -- carb controlled diet when able to take PO, resume home amaryl when taking PO, ISS, monitor glucose and titrate insulin prn  10. Hypertension -- stable, con't home medications, cardiology to see  11. Various joint pains -- PT/OT eval and treat -- outpatient follow up  12. Home medications reviewed  13.  See orders     Note that over 50 minutes was spent in evaluation of the patient, review of the chart and pertinent records, discussion with family/staff, etc    Lakeisha HERNANDES, NICANORC, FNP-BC  9:01 AM  10/20/2020

## 2020-10-20 NOTE — FLOWSHEET NOTE
visited patient while rounding on PCU. Assessment: Patient appeared in good spirits as he was talkative and joking. Patient is a  with three daughters that are his main support. Patient is experiencing heart failure and seemingly has come to terms with it as he stated, \"I have lived a good life, and I know I am too old for a heart transplant. \"    Intervention:  provided a caring and supportive presence via active listening.  prayed with patient. Outcome: Patient engaged in conversation and shared many memories/stories from his life and 62 years of marriage. Patient thanked  for visiting and praying with him. Plan: Chaplains will remain available to offer spiritual and emotional support as needed. 10/20/20 1925   Encounter Summary   Services provided to: Patient   Referral/Consult From: [facility];Hinduism/yoni community   [facility] of [facility] Visiting   (10/20/20)   Complexity of Encounter Moderate   Length of Encounter 30 minutes   Spiritual Assessment Completed Yes   Spiritual/Uatsdin   Type Spiritual support   Assessment Approachable   Intervention Active listening;Prayer   Outcome Engaged in conversation; Shared life review;Expressed gratitude   Electronically signed by Augie Mason on 10/20/2020 at 7:32 PM

## 2020-10-21 NOTE — FLOWSHEET NOTE
Chaplain avila on PCU. Assessment: Patient is resting in bed. He received news that his heart is continuing to decline. His wife  three years ago and he expressed both happy memories and accomplishments as well as regrets. He stated that he was surprised when preparing for his wife's  how much they had done together. He would not work quite so hard if he could turn the clock back. He is proud of their accomplishments together and he is know by friends for the mantra \"On to the next great thing. \" when he leaves friends. ACP: See note    Intervention: Engaged in conversation. Patient expressed appreciation for visit and offer of continued prayer. Plan: Chaplains are available on site or on call  for spiritual and emotional support. 10/21/20 1108   Encounter Summary   Services provided to: Patient   Referral/Consult From: [facility]   Continue Visiting   (10/21/20)   Complexity of Encounter Moderate   Length of Encounter 15 minutes   Advance Care Planning Yes   Spiritual/Restorationism   Type Spiritual support   Assessment Calm; Approachable   Intervention Active listening;Explored feelings, thoughts, concerns; Discussed death   Outcome Expressed gratitude;Engaged in conversation

## 2020-10-21 NOTE — TELEPHONE ENCOUNTER
[facility] Initial Follow Up Call    Outreach made within 2 business days of discharge: Yes    Patient: Maryan Gaxiola Patient : 1940   MRN: [id number]  Reason for Admission: Pleural Effusion, Diastolic Congestive Heart Failure Discharge Date: 10/21/20       Spoke with: patient     Discharge department/facility: [facility]    TCM Interactive Patient Contact:  Was patient able to fill all prescriptions: Yes  Was patient instructed to bring all medications to the follow-up visit: Yes  Is patient taking all medications as directed in the discharge summary?  Yes  Does patient understand their discharge instructions: Yes  Does patient have questions or concerns that need addressed prior to 7-14 day follow up office visit: no    Scheduled appointment with PCP within 7-14 days    Follow Up  Future Appointments   Date Time Provider Leeanne Ferrara   10/29/2020 10:20 AM DO ROBERT Ac   2020 10:40 AM DO ROBERT AcDPCONNER Skinner CMA'

## 2020-10-21 NOTE — ACP (ADVANCE CARE PLANNING)
Advance Care Planning     Advance Care Planning Activator (Inpatient)  Conversation Note      Date of ACP Conversation: 10/19/2020    Conversation Conducted with: Patient with Decision Making Capacity    ACP Activator: Jayesh Izaguirre    *When Decision Maker makes decisions on behalf of the incapacitated patient: Decision Maker is asked to consider and make decisions based on patient values, known preferences, or best interests. Health Care Decision Maker:     Current Designated Health Care Decision Maker:   (If there is a valid [address] named in the [address] [place] [address] Makers\" box in the ACP activity, but it is not visible above, be sure to open that field and then select the health care decision maker relationship (ie \"primary\") in the blank space to the right of the name.) Validate  this information as still accurate & up-to-date; edit [address] field as needed.)    Note: Assess and validate information in current ACP documents, as indicated. If no Decision Maker listed above or available through scanned documents, then:    If no Authorized Decision Maker has previously been identified, then patient chooses [address]:  \"Who would you like to name as your primary health care decision-maker? \"               Name: Fina Burroughs        Relationship:Child         Phone number: [phone] [facility])  \"Can this person be reached easily? \" Yes  \"Who would you like to name as your back-up decision maker? \"   Name: Taya Dominguez       Relationship:Child          Phone  number: [phone] [facility]) [phone]  Ronald Six this person be reached easily? \" Yes    Note: If the relationship of these Decision-Makers to the patient does NOT follow your state's Next of Kin hierarchy, recommend that patient complete ACP document that meets state-specific requirements to allow them to act on the patient's behalf when appropriate. Care Preferences    Ventilation:   \"If you were in your present state of health and suddenly became very ill and were unable to breathe on your own, what would your preference be about the use of a ventilator (breathing machine) if it were available to you? \"      Would the patient desire the use of ventilator (breathing machine)?: no    \"If your health worsens and it becomes clear that your chance of recovery is unlikely, what would your preference be about the use of a ventilator (breathing machine) if it were available to you? \"     Would the patient desire the use of ventilator (breathing machine)?: No      Resuscitation  \"CPR works best to restart the heart when there is a sudden event, like a heart attack, in someone who is otherwise healthy. Unfortunately, CPR does not typically restart the heart for people who have serious health conditions or who are very sick. \"    \"In the event your heart stopped as a result of an underlying serious health condition, would you want attempts to be made to restart your heart (answer \"yes\" for attempt to resuscitate) or would you prefer a natural death (answer \"no\" for do not attempt to resuscitate)? \" no      NOTE: If the patient has a valid advance directive AND now provides care preference(s) that are inconsistent with that prior directive, advise the patient to consider either: creating a new advance directive that complies with state-specific requirements; or, if that is not possible, orally revoking that prior directive in accordance with state-specific requirements, which must be documented in the EHR. [] Yes   [] No   Educated Patient / Yan Ramey regarding differences between Advance Directives and portable DNR orders.     Length of ACP Conversation in minutes:      Conversation Outcomes:  [] ACP discussion completed  [] Existing advance directive reviewed with patient; no changes to patient's previously recorded wishes  [] New Advance Directive completed  [] Portable Do Not Rescitate prepared for Provider review and signature  [] POLST/POST/MOLST/MOST prepared for Provider review and signature      Follow-up plan:    [] Schedule follow-up conversation to continue planning  [] Referred individual to Provider for additional questions/concerns   [] Advised patient/agent/surrogate to review completed ACP document and update if needed with changes in condition, patient preferences or care setting    [x] This note routed to one or more involved healthcare providers

## 2020-10-21 NOTE — PLAN OF CARE
Problem: Falls - Risk of:  Goal: Will remain free from falls  Description: Will remain free from falls  Outcome: Completed  Goal: Absence of physical injury  Description: Absence of physical injury  Outcome: Completed     Problem: SAFETY  Goal: Free from accidental physical injury  Outcome: Completed  Goal: Free from intentional harm  Outcome: Completed     Problem: DAILY CARE  Goal: Daily care needs are met  Outcome: Completed     Problem: PAIN  Goal: Patient's pain/discomfort is manageable  Outcome: Completed     Problem: SKIN INTEGRITY  Goal: Skin integrity is maintained or improved  Outcome: Completed     Problem: KNOWLEDGE DEFICIT  Goal: Patient/S.O. demonstrates understanding of disease process, treatment plan, medications, and discharge instructions.   Outcome: Completed     Problem: DISCHARGE BARRIERS  Goal: Patient's continuum of care needs are met  Outcome: Completed     Problem: OXYGENATION/RESPIRATORY FUNCTION  Goal: Patient will maintain patent airway  Outcome: Completed  Goal: Patient will achieve/maintain normal respiratory rate/effort  Description: Respiratory rate and effort will be within normal limits for the patient  Outcome: Completed     Problem: HEMODYNAMIC STATUS  Goal: Patient has stable vital signs and fluid balance  Outcome: Completed     Problem: FLUID AND ELECTROLYTE IMBALANCE  Goal: Fluid and electrolyte balance are achieved/maintained  Outcome: Completed     Problem: ACTIVITY INTOLERANCE/IMPAIRED MOBILITY  Goal: Mobility/activity is maintained at optimum level for patient  Outcome: Completed

## 2020-10-21 NOTE — PROGRESS NOTES
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 If possible, please document in progress notes and discharge summary the   etiology of CHF, if able to be determined. The medical record reflects the following:  Risk Factors: History of chronic combined CHF and dilated cardiomyopathy, HTN,   CKD, DM2, CAD  Clinical Indicators: Worsening SOB over past 3 days, chronic bilateral edema   BLE;  ProBNP  15585;  10/20/20 Echo:   EF 15%, Left ventricle is severely   dilated, moderate to severe mitral regurgitation, moderate tricuspid   regurgitation evidence of diastolic dysfunction  Treatment: IV diuresis, telemetry monitoring, ACS regimen, cardiology consult,   2D echo, daily weight, I&Os    Jose Carlos Camp RN, [address] [place] [address]   [phone]  . Options provided:  -- CHF due to Hypertensive Heart Disease  -- CHF due to Hypertensive Heart Disease and CAD  -- CHF not due to Hypertension but due to CAD  -- CHF due to Hypertensive Heart Disease and dilated cardiomyopathy  -- CHF not due to Hypertension but due to dilated cardiomyopathy  -- CHF due to Hypertensive Heart Disease and Valvular Heart Disease  -- CHF not due to Hypertension but due to valvular heart disease  -- CHF  due to  Combination of Hypertension , CAD, and dilated cardiomyopathy  -- Other - I will add my own diagnosis  -- Disagree - Not applicable / Not valid  -- Disagree - Clinically unable to determine / Unknown  -- Refer to Clinical Documentation Reviewer    PROVIDER RESPONSE TEXT:    This patient has CHF not due to hypertensive heart disease, CHF is due to   dilated cardiomyopathy.     Query created by: Angi Watts on 10/20/2020 2:47 PM      Electronically signed by:  Ara Walter 10/21/2020 1:08 PM

## 2020-10-21 NOTE — DISCHARGE INSTR - DIET

## 2020-10-22 NOTE — LETTER
[address]  [address], [address]           [address] [address] [facility]               10/23/20      Dear Sandro Adams,     We tried to reach you recently, after your hospital stay, to review your medications. I was unable to reach you on the telephone. We understand that medications can be confusing, and it is important to discuss your medications after a hospital stay. Please call us at [phone] option 7 to discuss your medications.             Sincerely,   Igor Pond, PharmD   [address] [facility]  Phone: [phone], or toll free [phone], option 7

## 2020-10-22 NOTE — DISCHARGE SUMMARY
[address]                 [address] [place] [address], 00 [facility]                               DISCHARGE SUMMARY    PATIENT NAME: Jose Martin Duncan                    :        1940  MED REC NO:   [id number]                             ROOM:       0206  ACCOUNT NO:   [de-identified]                           ADMIT DATE: 10/19/2020  PROVIDER:     Chris Urena MD                  DISCHARGE DATE:  10/21/2020    ATTENDING PHYSICIAN OF HOSPITALIZATION:  Ga Moreau MD    PERSONAL PHYSICIAN:  Mely Shore DO, [facility], [facility]. The patient seen in the outpatient setting by Dr. Zaira Aquino. Lawson Little,  Cardiology, [place]. The patient seen by [facility] [address] [place] [address] Cardiology, [place] [facility] this  hospitalization. DIAGNOSES:  1. Congestive heart failure, acute on chronic, systolic, ,  due to dilated cardiomyopathy. A 2D echo, 10/20/2020, LA severely  dilated, LV severely dilated, severely reduced left ventricular systolic  function, RA severely dilated, severe RV dilatation, reduced right  ventricular systolic function, pacemaker lead present, MAC, MV  thickening, moderate-to-severe MR, EWA but opens well, moderate TR, RVSP  51 mmHg, moderate pulmonary hypertension, aortic root mildly dilated,  3.8 cm, IVC increased diameter, impaired or no inspiratory variation,  diastolic dysfunction, LVEF now down to 15%. Most recent 2D echo of  2019, left ventricular ejection fraction of 25%. 2.  Large right pleural effusion, 10/19/2020, status post right pleural  effusion. Thoracentesis, 10/20/2020, bloody fluid sent for culture,  pathology, report pending. Chest x-ray, 10/20/2020, postprocedure, no  pneumothorax, decreased right pleural effusion, today's date still  continued right pleural effusion with underlying congestive heart  failure. No evidence of pneumothorax. 3.  Severely dilated cardiomyopathy. Pacemaker AICD.   4. Bilateral lower extremity edema. 5.  Coronary artery disease. Elevated D-dimer, 10/20/2020. CTA  chest/pulmonary, half-dose, 10/20/2020, no pulmonary embolus, large  right pleural effusion with right lower lobe atelectasis, cystic  enlargement of the tail of the pancreas, no change. 6.  Hypertension. 7.  Diabetes mellitus type 2.  8.  Hypothyroidism. 9.  Obstructive sleep apnea. 10.  Chronic kidney disease, stage III. 11.  GERD. 12.  Peripheral neuropathy. 13.  Depression, anxiety. 14.  Tobacco abuse, quit in 1979. Other medical problems set forth in the progress note of 10/21/2020,  incorporated for reference herein. HISTORY OF PRESENT ILLNESS AND HOSPITAL COURSE:  This is an 79-year-old  white male, patient of Pavel Elena DO, [facility],  [facility], and Dr. Marli Carias, Cardiology, [place]. he patient  presented with increasing shortness of breath, some lower extremity  edema, stated that he had been originally following the directions given  for his CHF. Unfortunately, the underlying reason appears to be further  decrease in the patient's left ventricular systolic function, now down  to 15%. Also, with greater appearing dilatation of the various chambers  that are consistent with the patient's prior severe dilated  cardiomyopathy. The patient was treated with diuretics, did well from  this respect, was able to be discharged to home on 10/21/2020. The  patient also underwent an ultrasound-guided thoracentesis, removing  approximately 800 mL to 1 liter of fluid, what appeared to be bloody,  hence was sent for culture and pathology, all pending at the time of  discharge. LABORATORY DATA:  The patient's laboratory data around the time of  discharge, white cell count 5.5, hemoglobin 9.9, hematocrit 32.9,  platelets 772,424.   Sodium 139, potassium 3.5, chloride 100, CO2 of 29,  BUN 32, creatinine 1.99, glucose 144, variable in the range of 67 to  173, calcium 8.3, GFR 28.    DISCHARGE INSTRUCTIONS:  FOLLOWUP:  Discharged to home on 10/21/2020. DIET:  Cardiac, diabetic. ACTIVITY:  As tolerated. CONDITION AT DISCHARGE:  Poor, stable. MEDICATIONS:  NEW:  Bumex 2 mg p.o. b.i.d., replacing Lasix; levothyroxine 25 mcg p.o.  daily (0.025 mg), noted to have elevated TSH; metoprolol succinate  (Toprol-XL) 100 mg p.o. daily. FOLLOWING MEDICATIONS FOR WHICH CHANGES MAY HAVE OCCURRED:  Zaroxolyn  (metolazone) 2.5 mg 30 minutes prior to the first dose of Bumex. FOLLOWING MEDICATIONS CONTINUED, NO CHANGE:  Aspirin 81 mg p.o. daily; Wellbutrin XL (bupropion extended release) 300 mg p.o. q.a.m.; calcium  carbonate 600 mg p.o. daily; loratadine 10 mg p.o. daily; gabapentin  (Neurontin) 600 mg p.o. b.i.d., glimepiride (Amaryl) 1 mg p.o. q.a.m.;  hydrocodone/APAP ([place]) 5/325 two p.o. q.a.m.; multivitamin 1 p.o.  daily; ondansetron (Zofran) 4 mg 3 times a day p.r.n. nausea, vomiting;  pantoprazole (Protonix) 40 mg p.o. daily; paroxetine (Paxil) 40 mg p.o.  daily; potassium chloride 20 mEq p.o. b.i.d.; tizanidine (Zanaflex) 2 mg  p.o. nightly p.r.n. muscle spasm; Kenalog (triamcinolone) 0.1% cream  topical twice daily to affected areas as needed; vitamin B12 5000 units  p.o. daily. SPECIFICALLY DISCONTINUED THIS HOSPITALIZATION WERE THE FOLLOWING  MEDICATIONS:  Carvedilol (Coreg); dicyclomine (Bentyl), digoxin, Lasix. Follow up with the patient's personal physician, Alexx Rojas DO,  [facility], [facility], and with Dr. Donna Drake,  Cardiology, [place]. Any aspect of the patient's care not discussed in the chart and/or  dictation will be addressed and treated as an outpatient. The patient's medications have been reviewed including, but not limited  to, pre-hospital, hospital and discharge medications.   The patient  and/or the patient's personal representatives were specifically advised  the only medications to be taken are those set forth in the discharge  orders and no other medications should be taken. Any prior medications  not on the discharge orders are specifically discontinued.         Secundino Rinne, MD    D: 10/21/2020 18:31:04       T: 10/21/2020 18:38:29     [email or web address]  Job#: [id number]     Doc#: [id number]    CC:

## 2020-10-22 NOTE — TELEPHONE ENCOUNTER
CLINICAL PHARMACY CONSULTATION: Transition of Care (SHIELA)  -----------------------------------------------------------------------------------------------  Cody Zambrano is a 79 y.o. male  who was discharged from [facility] on 10/21/2020 with a diagnosis of CHF. Attempt made to contact pt. Left message on home/mobile TAD requesting call back at [phone] or [phone] option 7. Will continue to attempt to contact pt as appropriate.     Anai Ornelas, Dasha  [address] [place] [address],[address] Clinical Pharmacist  O: [phone]  Toll free: [phone], option 7

## 2020-10-22 NOTE — CARE COORDINATION
[facility] Transitions Initial Follow Up Call- 1st attempt PHP/ COVID risk follow up call       Call within 2 business days of discharge: Yes    Patient: Abraham Esqueda Patient : 1940   MRN: [id number]  Reason for Admission: CHF, pleural effusion   Discharge Date: 10/21/20 RARS: Readmission Risk Score: 25      Last Discharge [facility]       Complaint Diagnosis Description Type Department Provider    10/19/20 Shortness of Breath Pleural effusion . .. ED to Hosp-Admission (Discharged) (ADMITTED) Marlyn Li DO. .. Spoke with: [facility]     Attempted to reach patient for initial transitional call.    left to return call to [address]: [facility]     Non-face-to-face services provided:  Assessment and support for treatment adherence and medication management-call to pharmacy who confirms bumex, toprol and levothyroxine were picked up         Follow Up  Future Appointments   Date Time Provider Leeanne Ferrara   10/29/2020 10:20 AM DO ROBERT Mojica   2020 10:40 AM DO ROBERT Mojica RN

## 2020-10-23 NOTE — TELEPHONE ENCOUNTER
CLINICAL PHARMACY CONSULTATION: Transition of Care (SHIELA)  -----------------------------------------------------------------------------------------------  Second attempt made to contact pt. Left message on home/mobile TAD requesting call back at [phone] or [phone] option 7. Will send letter to patient informing of calls and provide a phone number that can call back to discuss medications.     Meg Gutierrez, PharmD  [address] [place] [address] Clinical Pharmacist  O: [phone]  Toll free: [phone], option 7    CLINICAL PHARMACY NOTE   POST-DISCHARGE TELEPHONE FOLLOW-UP ADDENDUM  For Pharmacy Admin Tracking Only  TCM Call Made?: Yes  [facility]) Select Patient?: Yes  Total # of Interventions Recommended: 0  Total # Interventions Accepted: 0  Intervention Severity:   - Level 1 Intervention Present?: No   - Level 2 #: 0   - Level 3 #: 0  Outreach Status: Patient Unreachable  Care Coordinator Outreach to Patient?: No  Provider Contacted?: No  Time Spent (min): 15

## 2020-10-26 NOTE — TELEPHONE ENCOUNTER
Flaca calling stating pt was discharged from the hospital last Thursday or Friday after having fluid removed from his lungs, Flaca states pt has had a headache ever since he's been d/c'd home, calling for recommendations, pt uses loaded pharmacy, please advise at above number.

## 2020-10-26 NOTE — TELEPHONE ENCOUNTER
Left message with daughter asking if he has tried OTC medications to help with headache? If he has tried and is not helping or continued lingering headache with medication to call us as Dr Bebo Vicente would like to see him in the office to look into this further.

## 2020-10-27 NOTE — TELEPHONE ENCOUNTER
Flaca returning call, states she never got a call back, states pt continues to have headache even with use of Tylenol, pt offered [facility] appt today at 11:40, they will try to arrange for someone to go over to help pt do, if not, nurse advised pt can see [facility] in [facility] tomorrow.

## 2020-11-02 NOTE — ED PROVIDER NOTES
Gary 68      Pt Name: Jazmin Valladares  MRN: [id number]  [place] 1940  Date of evaluation: 11/2/2020      CHIEF COMPLAINT       Chief Complaint   Patient presents with    Headache    Aphasia    Shortness of Breath     Since dc'd from hospital last week. Started after he got home. No obvious slurred speech at this time. Resp even and labored with exertion. Unlabored at rest. Ambulatory with steady and upright gait. HISTORY OF PRESENT ILLNESS      The patient presents complaining of recent headache, difficulty with speech, shortness of breath, and some gait issues. He says that when he was discharged on 19 October for pneumonia, he went home and started having a headache. He is not having a headache anymore, but he was concerned about it. He thinks that he is having trouble speaking. He is not having trouble thinking however. He has not noted any weakness or numbness on one side, but says that his gait is not as good as it normally is. The patient has not had a fever since going home. He denies chest pain. He does get edema in his legs and some shortness of breath as a result. Nothing makes his symptoms better or worse otherwise. REVIEW OF SYSTEMS       All systems reviewed and negative unless noted in HPI. The patient denies fever or constitutional symptoms. Denies vision change. Denies any sore throat or rhinorrhea. Denies any neck pain or stiffness. Denies chest pain. Mild dyspnea. No nausea,  vomiting or diarrhea. Denies any dysuria. Denies urinary frequency or hematuria. Denies musculoskeletal injury or pain. Denies any weakness, numbness or focal neurologic deficit. Reports speech and gait issues. Chronic leg edema. No recent psychiatric issues. No easy bruising or bleeding. Denies any polyuria, polydypsia or history of immunocompromise.       PAST MEDICAL HISTORY    has a past medical history of A-fib [facility]), Abdominal pain, Abnormal PSA, Allergic rhinitis, Anemia, Arthritis, Benign prostatic hypertrophy, CAD (coronary artery disease), CHF (congestive heart failure) ([facility] [address] Depression with anxiety, [facility] GERD (gastroesophageal reflux disease), History of blood transfusion, Hypertension, Insomnia, Mass, Obstructive sleep apnea, Peripheral neuropathy, Pneumonia due to organism, Renal insufficiency, Restless leg syndrome, Type II or unspecified type diabetes mellitus without mention of complication, not stated as uncontrolled, and Ventricular tachycardia ([facility] SURGICAL HISTORY      has a past surgical history that includes Gastric bypass surgery (1996); Tonsillectomy; ventral hernia repair (1997); pacemaker placement (4/2009); Prostate biopsy (10/28/2009); fine needle aspiration (12/13/2012); other surgical history (03/10/2014); Upper gastrointestinal endoscopy (04/24/2014); Colonoscopy (4/24/2014); Cataract removal with implant (Bilateral, 4/2014); Hydrocele surgery (Right); and pacemaker placement (02/25/2020). CURRENT MEDICATIONS       Previous Medications    ASPIRIN 81 MG TABLET    Take 81 mg by mouth daily. BUMETANIDE (BUMEX) 2 MG TABLET    Take 1 tablet by mouth 2 times daily    BUPROPION (WELLBUTRIN XL) 300 MG EXTENDED RELEASE TABLET    Take 1 tablet by mouth every morning    CALCIUM CARBONATE 600 MG TABS TABLET    Take 1 tablet by mouth daily. CYANOCOBALAMIN (VITAMIN B-12) 5000 MCG TBDP    Take 1 tablet by mouth daily     EQ ALLERGY RELIEF 10 MG TABLET    Take 1 tablet by mouth once daily    GABAPENTIN (NEURONTIN) 600 MG TABLET    Take 1 tablet by mouth twice daily    GLIMEPIRIDE (AMARYL) 1 MG TABLET    Take 1 tablet by mouth every morning    HYDROCODONE-ACETAMINOPHEN (NORCO) 5-325 MG PER TABLET    Take 2 tablets by mouth every morning.     LEVOTHYROXINE (SYNTHROID) 25 MCG TABLET    Take 1 tablet by mouth Daily    METOLAZONE (ZAROXOLYN) 2.5 MG TABLET    Take 1 pill every other day 30 minutes prior to first bumex dose when weight goes up    METOPROLOL SUCCINATE (TOPROL XL) 100 MG EXTENDED RELEASE TABLET    Take 1 tablet by mouth daily    MULTIPLE VITAMINS-MINERALS (MULTIVITAMIN PO)    Take 1 tablet by mouth daily. ONDANSETRON (ZOFRAN) 4 MG TABLET    Take 1 tablet by mouth 3 times daily as needed for Nausea or Vomiting    PANTOPRAZOLE (PROTONIX) 40 MG TABLET    Take 40 mg by mouth daily    PAROXETINE (PAXIL) 40 MG TABLET    TAKE 1 TABLET BY MOUTH ONCE DAILY    POTASSIUM CHLORIDE (KLOR-CON M) 20 MEQ EXTENDED RELEASE TABLET    Take 1 tablet by mouth 2 times daily    TIZANIDINE (ZANAFLEX) 2 MG TABLET    Take 2 mg by mouth nightly as needed    TRIAMCINOLONE (KENALOG) 0.1 % CREAM    Apply topically 2 times daily as needed       ALLERGIES     has No Known Allergies. FAMILY HISTORY     He indicated that the status of his mother is unknown.     family history includes Diabetes in his mother; Heart Disease in his mother. SOCIAL HISTORY      reports that he quit smoking about 41 years ago. His smoking use included cigarettes. He has a 20.00 pack-year smoking history. He has never used smokeless tobacco. He reports that he does not drink alcohol or use drugs. PHYSICAL EXAM     INITIAL VITALS:  height is 6' (1.829 m) and weight is 232 lb (105.2 kg). His tympanic temperature is 97.8 °F (36.6 °C). His blood pressure is 105/70 and his pulse is 103. His respiration is 18 and oxygen saturation is 95%. The patient is alert and oriented, in no apparent distress. HEENT is atraumatic. Pupils are PERRL at 4 mm with normal extraocular motion. Mucous membranes moist.    Neck is supple with no lymphadenopathy. No JVD. No meningismus. Heart sounds regular rate and rhythm with no gallops, murmurs, or rubs. Pacemaker in left anterior chest.  Lungs clear, no wheezes, rales or rhonchi. Abdomen: soft, nontender with no pain to palpation. No pulsatile mass.   Normal bowel sounds are noted. No rebound or guarding. Musculoskeletal exam shows no evidence of trauma. Normal distal pulses in all extremities. Skin: 2+ edema in legs, bilat. Neurological exam reveals cranial nerves 2 through 12 grossly intact. Patient has equal  and normal deep tendon reflexes. Psychiatric: no hallucinations or suicidal ideation. Lymphatics.:  No lymphadenopathy. DIFFERENTIAL DIAGNOSIS/ MDM:     CVA, CHF, ACS    DIAGNOSTIC RESULTS     EKG: All EKG's are interpreted by the Emergency Department Physician who either signs or Co-signs this chart in the absence of a cardiologist.    Ventricularly paced rhythm at 96. No further analysis possible. RADIOLOGY:   I reviewed the radiologist interpretations:  CT HEAD WO CONTRAST   Final Result   No acute intracranial abnormality. XR CHEST PORTABLE   Final Result   Increasing left pleural effusion. Findings suggest congestive heart failure.                CT HEAD WO CONTRAST (Final result)   Result time 11/02/20 15:03:02   Final result by Donna Betancur MD (11/02/20 15:03:02)                 Impression:     No acute intracranial abnormality. Narrative:     EXAMINATION:   CT OF THE HEAD WITHOUT CONTRAST  11/2/2020 2:49 pm     TECHNIQUE:   CT of the head was performed without the administration of intravenous   contrast. Dose modulation, iterative reconstruction, and/or weight based   adjustment of the mA/kV was utilized to reduce the radiation dose to as low   as reasonably achievable. COMPARISON:   12/10/2014     HISTORY:   ORDERING SYSTEM PROVIDED HISTORY: Stroke 10 days ago   TECHNOLOGIST PROVIDED HISTORY:     Stroke 10 days ago   Reason for Exam: Recent headache, stroke, aphasia, multiple recent falls, hit   head on a trailer hitch over a week ago.    Acuity: Acute   Type of Exam: Initial     FINDINGS:   BRAIN/VENTRICLES: There is no acute intracranial hemorrhage, mass effect or   midline shift.  No abnormal extra-axial fluid collection.  The gray-white   differentiation is maintained without evidence of an acute infarct.  There is   no evidence of hydrocephalus. ORBITS: The visualized portion of the orbits demonstrate no acute abnormality. SINUSES: Peripheral mucosal thickening of the left maxillary sinus, otherwise   the paranasal sinuses and mastoids are clear. SOFT TISSUES/SKULL:  No acute abnormality of the visualized skull or soft   tissues.                       XR CHEST PORTABLE (Final result)   Result time 11/02/20 14:47:05   Final result by Shanna Love MD (11/02/20 14:47:05)                 Impression:     Increasing left pleural effusion.  Findings suggest congestive heart failure. Narrative:     EXAMINATION:   ONE XRAY VIEW OF THE CHEST     11/2/2020 2:42 pm     COMPARISON:   PA and lateral chest October 21, 2020.  Frontal view of the chest October 202020. HISTORY:   ORDERING SYSTEM PROVIDED HISTORY: stroke symptoms   TECHNOLOGIST PROVIDED HISTORY:   stroke symptoms   Reason for Exam: SOB; dyspnea on exertion; headache     FINDINGS:   Increasing right pleural effusion.  Pulmonary vascularity is more pronounced. Pacemaker remains in place with leads extending into and over the heart.      The right side of the heart is obscured, with the heart probably within   limits of normal for size.  No evidence of left pleural effusion.                        LABS:  Results for orders placed or performed during the hospital encounter of 11/02/20   CBC Auto Differential   Result Value Ref Range    WBC 4.8 3.5 - 11.3 k/uL    RBC 3.66 (L) 4.21 - 5.77 m/uL    Hemoglobin 9.8 (L) 13.0 - 17.0 g/dL    Hematocrit 33.0 (L) 40.7 - 50.3 %    MCV 90.2 82.6 - 102.9 fL    MCH 26.8 25.2 - 33.5 pg    MCHC 29.7 25.2 - 33.5 g/dL    RDW 15.4 (H) 11.8 - 14.4 %    Platelets 798 921 - 837 k/uL    MPV 10.0 8.1 - 13.5 fL    NRBC Automated 0.0 0.0 per 100 WBC    Differential Type NOT REPORTED     WBC Morphology NOT REPORTED     RBC Morphology NOT REPORTED     Platelet Estimate NOT REPORTED     Monocytes 5 (L) 6 - 14 %    Lymphocytes 5 (L) 15 - 43 %    Seg Neutrophils 86 (H) 44 - 74 %    Eosinophils % 2 1 - 8 %    Basophils 1 0 - 2 %    Immature Granulocytes 0 0 %    Atypical Lymphocytes 1 %    Absolute Mono # 0.24 0.1 - 1.2 k/uL    Absolute Lymph # 0.24 (L) 1.0 - 4.8 k/uL    Segs Absolute 4.12 1.8 - 7.7 k/uL    Absolute Eos # 0.10 0.0 - 0.4 k/uL    Basophils Absolute 0.05 0.0 - 0.2 k/uL    Absolute Immature Granulocyte 0.00 0.00 - 0.30 k/uL    Atypical Lymphocytes Absolute 0.05 k/uL    Morphology Platelet count adequate     Morphology 1+ OVALOCYTES     Morphology TOXIC GRANULATION PRESENT    Basic Metabolic Panel   Result Value Ref Range    Glucose 153 (H) 70 - 99 mg/dL    BUN 35 (H) 8 - 23 mg/dL    CREATININE 2.04 (H) 0.70 - 1.20 mg/dL    Bun/Cre Ratio 17 9 - 20    Calcium 8.2 (L) 8.6 - 10.4 mg/dL    Sodium 138 135 - 144 mmol/L    Potassium 4.0 3.7 - 5.3 mmol/L    Chloride 100 98 - 107 mmol/L    CO2 28 20 - 31 mmol/L    Anion Gap 10 9 - 17 mmol/L    GFR Non-African American 32 (L) >60 mL/min    GFR  38 (L) >60 mL/min    GFR Comment          GFR Staging NOT REPORTED    Brain Natriuretic Peptide   Result Value Ref Range    Pro-BNP 11,283 (H) <300 pg/mL    BNP Interpretation Pro-BNP Reference Range:    Troponin   Result Value Ref Range    Troponin, High Sensitivity 60 (HH) 0 - 22 ng/L    Troponin T NOT REPORTED <0.03 ng/mL    Troponin Interp NOT REPORTED    Protime-INR   Result Value Ref Range    Protime 17.0 (H) 11.5 - 14.2 sec    INR 1.4    APTT   Result Value Ref Range    PTT 33.6 23.9 - 33.8 sec         EMERGENCY DEPARTMENT COURSE:   Vitals:    Vitals:    11/02/20 1409   BP: 105/70   Pulse: 103   Resp: 18   Temp: 97.8 °F (36.6 °C)   TempSrc: Tympanic   SpO2: 95%   Weight: 232 lb (105.2 kg)   Height: 6' (1.829 m)     -------------------------  BP: 105/70, Temp: 97.8 °F (36.6 °C), Pulse: 103, Resp: 18      Re-evaluation Notes    The patient CT does not demonstrate recent stroke. I think he should see a neurologist if he is having gait issues. I have given him referral.  With regard to his edema, he should elevate his legs more. He has chronic elevation of his troponin. The patient is discharged in good condition. FINAL IMPRESSION      1. Gait difficulty    2.  Peripheral edema          DISPOSITION/PLAN   DISPOSITION        Condition on Disposition    good    PATIENT REFERRED TO:  Hanna Falcon MD  [place] [id number]  [phone]    In 2 days        DISCHARGE MEDICATIONS:  New Prescriptions    No medications on file       (Please note that portions of this note were completed with a voice recognition program.  Efforts were made to edit the dictations but occasionally words are mis-transcribed.)    Gorman MD   Attending Emergency Physician         Mel Wisdom MD  11/02/20 [id number]

## 2020-11-03 NOTE — CARE COORDINATION
Caroline Avalos was seen at [facility] 11/2/2020- Gait difficulty, Peripheral edema. He is scheduled for PCP F/U.     11/3/2020- 10:10 am Left message requesting return call re: Initial ER/Covid F/U call. 11/4/2020- 12:57 pm Left message requesting return call.

## 2020-11-09 NOTE — TELEPHONE ENCOUNTER
Pedrito Urena called requesting a refill of the below medication which has been pended for you: this was taken off patients med list while in hosp 10/21/2020    Requested Prescriptions     Pending Prescriptions Disp Refills    carvedilol (COREG) 3.125 MG tablet [Pharmacy Med Name: Carvedilol 3.125 MG Oral Tablet] 180 tablet 0     Sig: TAKE 1 TABLET BY MOUTH TWICE DAILY WITH MEALS       Last Appointment Date: 9/1/2020  Next Appointment Date: 11/11/2020    No Known Allergies

## 2020-11-10 NOTE — TELEPHONE ENCOUNTER
Mana calling stating pt feels like the walls are closing in around him, states he goes to bed at night and can't sleep, can shut his mind down about everything that is going on in his life, pt just worries about all he has to do before he dies, pt recently got news that his heart was functioning at an even lesser percent, pt does have an appt Wednesday but pt wanted daughter to call in today regarding his issues, please advise mana at above number.

## 2020-11-11 PROBLEM — I50.9 PLEURAL EFFUSION DUE TO CHF (CONGESTIVE HEART FAILURE) (HCC): Status: ACTIVE | Noted: 2020-01-01

## 2020-11-11 NOTE — H&P
HOSPITALIST ADMISSION H&P      REASON FOR ADMISSION:     CHF---severe-------right pleural effusion recurrent  ESTIMATED LENGTH OF STAY:   > 2 midnights---2-4 days    ATTENDING/ADMITTING PHYSICIAN: Cordell Altman MD  PCP: Guille Cisneros DO    HISTORY OF PRESENT ILLNESS:      The patient is a 79 y.o. male patient of Guille Cisneros DO who presents with increasing shortness of breath---increasing BLE edema to low back----known CHF with EF ~ 15%---CXR with central vascular congestion---markedly elevated pro-BNP----sitting on the edge of the bed----labored breathing and accessory use occasionally after taking or coughing [dry-non-productive]    Elevated troponin--chronic    Recurrent right pleural effusion---moderate-to large ----> planned thoracentesis    DM2---blood glucose = 83 at admission     CKD--Stage 4----typical       See below for additional PMH. Patient [id number]-available records reviewed, including, but not limited to,  ER reports--labs--imaging---EKG---2D ECHOs---office records---personalo notes       Past Medical History:   Diagnosis Date    A-fib [facility]) 12/2/15    Dr Jerel Hunt    Abdominal pain 9/28/2014    Pancreatic duct dilatation versus lesions     Abnormal PSA     Managed by Dr. Hiro Mcdonald.  Allergic rhinitis     Anemia     Arthritis     Benign prostatic hypertrophy     CAD (coronary artery disease)     CHF (congestive heart failure) (HCC)     Depression with anxiety     Dilated cardiomyopathy (HCC)     Severe, with ejection fraction 20 to 25%. Most recent echocardiogram 12/9/2008, mild to moderate aortic insufficiency, mild mitral and tricuspid insufficiency. 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     Obstructive sleep apnea     Peripheral neuropathy     Pneumonia due to organism 12/6/2019    Renal insufficiency chronic kidney disease  stage 3 sees dr Herbert Patino    Restless leg syndrome     Type II or unspecified type diabetes mellitus without mention of complication, not stated as uncontrolled     Ventricular tachycardia [facility])            Past Surgical History:   Procedure Laterality Date    CATARACT REMOVAL WITH IMPLANT Bilateral 4/2014    Dr Samantha Coronado  4/24/2014    with egd    FINE NEEDLE ASPIRATION  12/13/2012    Endoscopic ultrasound with fine needle aspiration of pancreatic mass. [facility]    with appendectomy and cholecystectomy    HYDROCELE EXCISION Right     age 20    OTHER SURGICAL HISTORY  03/10/2014    CRT-ICD PULSE GENERATOR REPLACEMENT WITH INTRAOPERATIVE DEFIBRILLATION THRESHOLD TESTING    PACEMAKER PLACEMENT  4/2009    Defibrillator with pacemaker placement and subsequent replacement.  PACEMAKER PLACEMENT  02/25/2020    Defibrillator with pacemaker placement replacement at [address] [facility]  10/28/2009    Benign prostate.  TONSILLECTOMY      as a child    UPPER GASTROINTESTINAL ENDOSCOPY  04/24/2014    with colonoscopy   [facility]    after gastric bypass surgery       Medications Prior to Admission:    Not in a hospital admission. Allergies:    Patient has no known allergies. Social History:    reports that he quit smoking about 41 years ago. His smoking use included cigarettes. He has a 20.00 pack-year smoking history. He has never used smokeless tobacco. He reports that he does not drink alcohol or use drugs. Family History:   family history includes Diabetes in his mother; Heart Disease in his mother. REVIEW OF SYSTEMS:  See HPI and problem list; otherwise no other new complaints with respect to HEENT, neck, pulmonary, coronary, GI, , endocrine, musculoskeletal, immune system/connective tissue disease, hematologic, neuropsych, skin, lymphatics, or malignancies.      PHYSICAL CREATININE 2.16 11/11/2020    CREATININE 1.4 12/18/2018    GFRAA 36 11/11/2020    AGRATIO 1.3 09/30/2019    LABGLOM 29 11/11/2020    GLUCOSE 83 11/11/2020    GLUCOSE 122 09/30/2019    PROT 6.8 11/11/2020    PROT 5.8 01/05/2019    LABALBU 3.9 11/11/2020    LABALBU 3.6 09/30/2019    CALCIUM 8.5 11/11/2020    BILITOT 0.42 11/11/2020    BILITOT NEGATIVE 09/29/2019    ALKPHOS 129 11/11/2020    AST 20 11/11/2020    ALT 15 11/11/2020       ASSESSMENT:      Patient Active Problem List   Diagnosis    Dilated cardiomyopathy ([address]    Anemia    Peripheral neuropathy    Chronic kidney disease (CKD), stage III (moderate)    Insomnia    Benign prostatic hyperplasia    Diabetes mellitus, type II ([address]    Restless leg syndrome    Obstructive sleep apnea    HTN (hypertension)    Coronary artery disease    Anxiety    Depression    GERD (gastroesophageal reflux disease)    Allergic rhinitis    Diabetes ([address]    Chronic kidney disease (CKD)    Dyslipidemia    Congestive heart failure (HCC)    Bilateral lower extremity edema    Acute on chronic systolic congestive heart failure (HCC)    Heart failure (HCC)    Primary osteoarthritis of right hip    Chronic right hip pain    Polyneuropathy associated with underlying disease ([address]    Atrial fibrillation ([address]    Pleural effusion    Pleural effusion due to CHF (congestive heart failure) ([address]     Gilbert Gonzales.         80  WM  Eddie Billy, FP; Edmar Townsend, Cardiology--ovalle]                                                        DNR-CCA   PACEMAKER-AICD--NO MRIs    HEPARIN subcutaneously--hold  SCDs  SUPPLEMENTAL OXYGEN    Anti-infectives:  none    CHF---acute-on-chronic systolic------11.11.2020---due to dilated cardiomyopathy  Right pleural effusion----recurrent----11.11.2020              CXR----11.11.2020---cardiomegaly with central vascular congestion---                        moderate-to-large right pleural effusion--increased from prior imaging studies             EKG---11.11.2020--ventricular-paced rhythm--104                Pro-BNP----102526----72.11.2020            Chronically elevated troponins--11.11.2020----68-----60--on 11.2.2020    Prior:    POD _____ right pleural effusion thoracentesis---10.20.2020  CHF--acute-on-chronic systolic---10.19.2020--due to dilated cardiomyopathy        2D ECHO----10.20.2020--LA severely dilated---LV severely dilated---                          severely reduced LVSF--RA severely dilated---severe RV                          dilatation with reduced RVSF--pacemaker lead present----                          MAC MV thickening--moderate-to-severe MR--EWA but                          opens well--moderate TR----RVSP ~ 51 mm Hg---moderate                            pulmonary hypertension--AR mildly dilated at 3.8 cm---                           IVC increased diameter and impair or no inspiratory variation----                           DD----LVEF ~ 15%  Large right pleural effusion---10.19.2020        CXR--10.20.2020--large right pleural effusion        EKG---10.19.2020---ventricular paced rhythm--96        Left should x-ray---10.19.2020----severe glenohumeral OA--calcific tendinosis            Acute-on-chronic systolic---1.5.2019---due to cardiomyopathy            Acute-on-chronic systolic---12.6. Margnancy Simeon 2019--due to dilated cardiomyopathy           2D ECHO---12.6.2019-----severely dilated                         LA---moderate LVH--severely reduced LVSF--RA                         dilatation--RV dilatation--NRVSF---EWA but opens                        well---mild AI---mild-to-moderate MR--mild TR--                        RVSP ~ 30 mm Hg---LVEF ~ 25%           Acute-on-chronic systolic--3. 2.2019---1.18.2016            DUS---BLE---3. 1.2019--no DVT--limited by BLE edema            EKG---3. 1.2019--#2---ventricular paced rhythm--92            EKG---3. 1.2019--#1---ventricular paced rhythm--89    Severe dilated cardiomyopathy           2D ECHO----3. 4.2019---severe TRES----severely dilated LA--                    LV upper limits normal--severely reduced LVSF--severely                    dilated RA--RV dilatation--NRVSF--trivial TR---mild EWA--                    trivial TR---RVSP ~ 38 mm Hg---aortic root mildly dilated                     4.1 cm---Grade II moderate DD----LVEF ~ 25%           2-D ECHO--12.10.2008--mild-to-moderate AI--mild MI-TI--                     LVEF ~ 20-25%           PACEMAKER--AICD              CRT-ICD pulse generator replacement--intraoperative                           defibrillation threshold testing--3.10.2014               Atrial-biventricular pacing--sensing--2009                Arrhythmia--ventricular tachycardia                         PVCs--bigeminy--underlying prior rhythm  BLE edema   ASCVD            MI ruled out---1.19.2016            2D ECHO---1.18.2016--TRES--moderate LVH--                          NRVSF--mild AR--ns TR--RVSP ~ 30 mm Hg--                           pacemaker--AICD wire present--Grade 2 DD---                           LVEF ~ 30%   Hypertension  Diabetes Mellitus Type 2  Hypothyroidism   Obstructive sleep apnea  CKD---Stage 3  GERD  Restless leg syndrome  Peripheral neuropathy  Depression--anxiety  Obesity  Tobacco abuse--quit---5.1.1979   PMH:      BPH--abnormal PSA, diarrhea, 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

## 2020-11-11 NOTE — ED PROVIDER NOTES
[facility]  eMERGENCY dEPARTMENT eNCOUnter      Pt Name: Anuradha Crawford  MRN: [id number]  [place] 1940  Date of evaluation: 11/11/2020      CHIEF COMPLAINT       Chief Complaint   Patient presents with    Shortness of Breath     \" started this weekend, but really bad yesterday and today\"         HISTORY OF PRESENT ILLNESS    Anuradha Crawford is a 79 y.o. male who presents chief complaint of shortness of breath he has had a history of congestive heart failure had to be admitted for diuresis and removal of a right pleural effusion back last month he says is been no chest pain no fevers no chills but is short of breath with any kind of activities had increased bilateral lower extremity swelling. No chest pain no nausea vomiting no fevers or chills  Patient had an appointment with his doctor who sent him here immediately after seeing him  Patient has a history of systolic congestive heart failure with an ejection fraction of 15%  REVIEW OF SYSTEMS         Review of Systems   Constitutional: Negative for chills and fever. HENT: Negative for congestion, dental problem, sore throat and trouble swallowing. Eyes: Negative for visual disturbance. Respiratory: Positive for chest tightness and shortness of breath. Negative for wheezing. Cardiovascular: Positive for leg swelling. Negative for chest pain and palpitations. Gastrointestinal: Negative for abdominal pain, diarrhea, nausea and vomiting. Genitourinary: Negative for difficulty urinating, dysuria and testicular pain. Musculoskeletal: Negative for back pain, joint swelling and neck pain. Skin: Negative for rash. Neurological: Negative for dizziness, syncope, weakness and headaches. Hematological: Negative for adenopathy. Does not bruise/bleed easily. Psychiatric/Behavioral: Negative for confusion and suicidal ideas.           PAST MEDICAL HISTORY    has a past medical history of A-fib ([address] Abdominal pain, Abnormal PSA, Allergic rhinitis, Anemia, Arthritis, Benign prostatic hypertrophy, CAD (coronary artery disease), CHF (congestive heart failure) ([facility] [address] Depression with anxiety, Dilated cardiomyopathy ([facility] [address] GERD (gastroesophageal reflux disease), History of blood transfusion, Hypertension, Insomnia, Mass, Obstructive sleep apnea, Peripheral neuropathy, Pneumonia due to organism, Renal insufficiency, Restless leg syndrome, Type II or unspecified type diabetes mellitus without mention of complication, not stated as uncontrolled, and Ventricular tachycardia ([facility] [address] SURGICAL HISTORY      has a past surgical history that includes Gastric bypass surgery (1996); Tonsillectomy; ventral hernia repair (1997); pacemaker placement (4/2009); Prostate biopsy (10/28/2009); fine needle aspiration (12/13/2012); other surgical history (03/10/2014); Upper gastrointestinal endoscopy (04/24/2014); Colonoscopy (4/24/2014); Cataract removal with implant (Bilateral, 4/2014); Hydrocele surgery (Right); and pacemaker placement (02/25/2020). CURRENT MEDICATIONS       Previous Medications    ASPIRIN 81 MG TABLET    Take 81 mg by mouth daily. BUMETANIDE (BUMEX) 2 MG TABLET    Take 1 tablet by mouth 2 times daily    BUPROPION (WELLBUTRIN XL) 300 MG EXTENDED RELEASE TABLET    Take 1 tablet by mouth every morning    CALCIUM CARBONATE 600 MG TABS TABLET    Take 1 tablet by mouth daily. CYANOCOBALAMIN (VITAMIN B-12) 5000 MCG TBDP    Take 1 tablet by mouth daily     DAPAGLIFLOZIN (FARXIGA) 10 MG TABLET    Take 10 mg by mouth every morning    DESLORATADINE (CLARINEX PO)    Take by mouth daily    EQ ALLERGY RELIEF 10 MG TABLET    Take 1 tablet by mouth once daily    GABAPENTIN (NEURONTIN) 600 MG TABLET    Take 1 tablet by mouth twice daily    GLIMEPIRIDE (AMARYL) 1 MG TABLET    Take 1 tablet by mouth every morning    HYDROCODONE-ACETAMINOPHEN (NORCO) 5-325 MG PER TABLET    Take 2 tablets by mouth every morning.     LEVOTHYROXINE (SYNTHROID) 25 MCG TABLET    Take 1 Musculoskeletal: Normal range of motion and neck supple. Cardiovascular:      Rate and Rhythm: Normal rate and regular rhythm. Pulmonary:      Effort: Pulmonary effort is normal.      Breath sounds: Examination of the right-upper field reveals decreased breath sounds. Examination of the right-middle field reveals decreased breath sounds. Examination of the right-lower field reveals decreased breath sounds. Decreased breath sounds present. No wheezing, rhonchi or rales. Abdominal:      General: Bowel sounds are normal.      Palpations: Abdomen is soft. Musculoskeletal: Normal range of motion. Right lower leg: He exhibits no tenderness. Edema present. Left lower leg: He exhibits no tenderness. Edema present. Skin:     General: Skin is warm and dry. Neurological:      Mental Status: He is alert and oriented to person, place, and time. Psychiatric:         Behavior: Behavior normal.           DIFFERENTIAL DIAGNOSIS/ MDM:     Acute exacerbation of CHF probable admission  DIAGNOSTIC RESULTS     EKG: All EKG's are interpreted by the Emergency Department Physician who either signs or Co-signs this chart in the absence of a cardiologist.  EKG  Ventricular paced rhythm at 104      RADIOLOGY:   I directly visualized the following  images and reviewed the radiologist interpretations:     EXAMINATION:    ONE XRAY VIEW OF THE CHEST         11/11/2020 3:49 pm         COMPARISON:    11/02/2020.         HISTORY:    ORDERING SYSTEM PROVIDED HISTORY: shortness of breath    TECHNOLOGIST PROVIDED HISTORY:    shortness of breath    Reason for Exam: Increased shortness of breath for 3 days.  History of CHF    and pleural effusion, bilateral leg swelling.     Acuity: Acute    Type of Exam: Subsequent/Follow-up         FINDINGS:    The cardiac silhouette is enlarged.  Central vascular congestion.  The left    lung is clear.  Moderate to large right pleural effusion with basilar volume    loss, increased compared to the previous study.  Left-sided AICD device.              Impression    Cardiomegaly with central vascular congestion.         Increased large right pleural effusion with basilar volume loss.                   ED BEDSIDE ULTRASOUND:       LABS:  Labs Reviewed   CBC WITH AUTO DIFFERENTIAL - Abnormal; Notable for the following components:       Result Value    RBC 3.96 (*)     Hemoglobin 10.6 (*)     Hematocrit 35.3 (*)     RDW 15.9 (*)     Seg Neutrophils 83 (*)     Lymphocytes 7 (*)     Absolute Lymph # 0.39 (*)     All other components within normal limits   COMPREHENSIVE METABOLIC PANEL - Abnormal; Notable for the following components:    BUN 37 (*)     CREATININE 2.16 (*)     Calcium 8.5 (*)     GFR Non- 29 (*)     GFR  36 (*)     All other components within normal limits   MYOGLOBIN, SERUM - Abnormal; Notable for the following components:    Myoglobin 122 (*)     All other components within normal limits   PROTIME-INR - Abnormal; Notable for the following components:    Protime 16.6 (*)     All other components within normal limits   TROPONIN - Abnormal; Notable for the following components:    Troponin, High Sensitivity 68 (*)     All other components within normal limits   BRAIN NATRIURETIC PEPTIDE - Abnormal; Notable for the following components:    Pro-BNP 15,401 (*)     All other components within normal limits   LACTIC ACID   COVID-19   URINALYSIS       Elevated proBNP slightly elevated troponin in the range is normal troponins are low also renal insufficiency    EMERGENCY DEPARTMENT COURSE:   Vitals:    Vitals:    11/11/20 1459 11/11/20 1530 11/11/20 1600 11/11/20 1630   BP: 92/68 102/68 99/77 (!) 109/97   Pulse: 107 113 105 107   Resp: 20 20 20 22   Temp: 97.5 °F (36.4 °C)      TempSrc: Tympanic      SpO2: 98% 100% 99%    Weight: 242 lb (109.8 kg)      Height: 6' (1.829 m)        -------------------------  BP: (!) 109/97, Temp: 97.5 °F (36.4 °C), Pulse: 107, Resp: 22      Re-evaluation Notes    Resting more comfortably on O2    CRITICAL CARE:   IP CONSULT TO INTERVENTIONAL RADIOLOGY  IP CONSULT TO HOSPITALIST        CONSULTS:      PROCEDURES:  None    FINAL IMPRESSION      1. Acute on chronic systolic congestive heart failure ([address]    2. Recurrent right pleural effusion          DISPOSITION/PLAN   DISPOSITION admission    Condition on Disposition    Stable    PATIENT REFERRED TO:  No follow-up provider specified. DISCHARGE MEDICATIONS:  New Prescriptions    No medications on file       (Please note that portions of this note were completed with a voice recognition program.  Efforts were made to edit the dictations but occasionally words are mis-transcribed.)    Starr MD, F.A.A.E.M.   Attending Emergency Physician                          Say Bazan MD  11/11/20 9822

## 2020-11-11 NOTE — PROGRESS NOTES
Patient was seen today in the office secondary to hospital follow-up from recent admission secondary to acute CHF with pleural effusion. Patient was extremely short of breath and had significant edema in his lower extremities. States that he has gotten significantly worse in the last 3 days duration. Due to the severity of his symptoms will plan to send him back to the emergency room as he states that he does not feel that he can continue at home in his current state. Report called to the ER registration and patient taken over via wheelchair.

## 2020-11-11 NOTE — PROGRESS NOTES
[facility] Snow[facility] Pleural effusion due to CHF (congestive heart failure) ([facility] [address] [I50.9] . Vitals:    11/11/20 1831   BP:    Pulse:    Resp:    Temp:    SpO2: 98%   . Patients home meds are   Prior to Admission medications    Medication Sig Start Date End Date Taking? Authorizing Provider   Desloratadine (CLARINEX PO) Take by mouth daily   Yes Historical Provider, MD   dapagliflozin (FARXIGA) 10 MG tablet Take 10 mg by mouth every morning   Yes Historical Provider, MD   levothyroxine (SYNTHROID) 25 MCG tablet Take 1 tablet by mouth Daily 10/22/20  Yes Dina Martinez   bumetanide (BUMEX) 2 MG tablet Take 1 tablet by mouth 2 times daily 10/21/20  Yes [facility] ALLERGY RELIEF 10 MG tablet Take 1 tablet by mouth once daily 10/19/20  Yes Markham Gaucher, DO   gabapentin (NEURONTIN) 600 MG tablet Take 1 tablet by mouth twice daily 10/7/20 1/5/21 Yes Markham Gaucher, DO   PARoxetine (PAXIL) 40 MG tablet TAKE 1 TABLET BY MOUTH ONCE DAILY 6/11/20  Yes Markham Gaucher, DO   glimepiride (AMARYL) 1 MG tablet Take 1 tablet by mouth every morning 6/11/20  Yes Markham Gaucher, DO   potassium chloride (KLOR-CON M) 20 MEQ extended release tablet Take 1 tablet by mouth 2 times daily 6/11/20  Yes Markham Gaucher, DO   buPROPion (WELLBUTRIN XL) 300 MG extended release tablet Take 1 tablet by mouth every morning 5/29/20  Yes Markham Gaucher,    HYDROcodone-acetaminophen (NORCO) 5-325 MG per tablet Take 2 tablets by mouth every morning. Yes Historical Provider, MD   pantoprazole (PROTONIX) 40 MG tablet Take 40 mg by mouth daily   Yes Historical Provider, MD   Cyanocobalamin (VITAMIN B-12) 5000 MCG TBDP Take 1 tablet by mouth daily    Yes Historical Provider, MD   Multiple Vitamins-Minerals (MULTIVITAMIN PO) Take 1 tablet by mouth daily. Yes Historical Provider, MD   calcium carbonate 600 MG TABS tablet Take 1 tablet by mouth daily.    Yes Historical Provider, MD   aspirin 81 MG tablet [x]  SOB on exertion []  SOB min activity []  At rest/acute   e FEV% Predicted       [x]  NA []  Above 69%  []  Unable []  Above 60-69%  []  Unable []  Above 50-59%  []  Unable []  Above 35-49%  []  Unable []  Less than 35%  []  Unable                 [x]  Hyperinflation Assessment  Score 1 2 3   CXR and Breath Sounds   [x]  Clear []  No atelectasis  Basilar aeration []  Atelectasis or absent basilar breath sounds   Incentive Spirometry Volume  (Per IBW)   [x]  Greater than or equal to 15ml/Kg []  less than 15ml/Kg []  less than 15ml/Kg   Surgery within last 2 weeks [x]  None or general   []  Abdominal or thoracic surgery  []  Abdominal or thoracic   Chronic Pulmonary Historyre [x]  No []  Yes []  Yes     [x]  Secretion Management Assessment  Score 1 2 3   Bilateral Breath Sounds   [x]  Occasional Rhonchi []  Scattered Rhonchi []  Course Rhonchi and/or poor aeration   Sputum    [x]  Small amount of thin secretions []  Moderate amount of viscous secretions []  Copius, Viscious Yellow/ Secretions   CXR as reported by physician [x]  clear  []  Unavailable []  Infiltrates and/or consolidation  []  Unavailable []  Mucus Plugging and or lobar consolidation  []  Unavailable   Cough [x]  Strong, productive cough []  Weak productive cough []  No cough or weak non-productive cough   Albert Blush  6:32 PM                            FEMALE                                  MALE                            FEV1 Predicted Normal Values                        FEV1 Predicted Normal Values          Age                                     Height in Feet and Inches       Age                                     Height in Feet and Inches       4' 11\" 5' 1\" 5' 3\" 5' 5\" 5' 7\" 5' 9\" 5' 11\" 6' 1\"  4' 11\" 5' 1\" 5' 3\" 5' 5\" 5' 7\" 5' 9\" 5' 11\" 6' 1\"   42 - 45 2.49 2.66 2.84 3.03 3.22 3.42 3.62 3.83 42 - 45 2.82 3.03 3.26 3.49 3.72 3.96 4.22 4.47   46 - 49 2.40 2.57 2.76 2.94 3.14 3.33 3.54 3.75 46 - 49 2.70 2.92 3.14 3.37 3.61 3.85 4.10 4.36   50 - 53 2.31 2.48 2.66 2.85 3.04 3.24 3.45 3.66 50 - 53 2.58 2.80 3.02 3.25 3.49 3.73 3.98 4.24   54 - 57 2.21 2.38 2.57 2.75 2.95 3.14 3.35 3.56 54 - 57 2.46 2.67 2.89 3.12 3.36 3.60 3.85 4.11   58 - 61 2.10 2.28 2.46 2.65 2.84 3.04 3.24 3.45 58 - 61 2.32 2.54 2.76 2.99 3.23 3.47 3.72 3.98   62 - 65 1.99 2.17 2.35 2.54 2.73 2.93 3.13 3.34 62 - 65 2.19 2.40 2.62 2.85 3.09 3.33 3.58 3.84   66 - 69 1.88 2.05 2.23 2.42 2.61 2.81 3.02 3.23 66 - 69 2.04 2.26 2.48 2.71 2.95 3.19 3.44 3.70   70+ 1.82 1.99 2.17 2.36 2.55 2.75 2.95 3.16 70+ 1.97 2.19 2.41 2.64 2.87 3.12 3.37 3.62             Predicted Peak Expiratory Flow Rate                                       Height (in)  Female       Height (in) Male           Age 64 63 56 61 58 73 78 74 Age            21 344 357 372 387 402 417 432 446  60 62 64 66 68 70 72 74 76   25 337 352 366 381 396 411 426 441 25 447 476 [phone] [phone]   30 329 344 359 374 389 404 419 434 30 437 466 494 523 [phone] 667   35 322 337 351 366 381 396 411 426 35 426 455 484 512 541 [phone]   40 314 329 344 359 374 389 404 419 40 416 445 473 [phone] 617 647   45 307 322 336 351 366 381 396 411 45 405 434 463 491 520 549 577 606 636   50 299 314 329 344 359 374 389 404 50 395 424 452 481 510 538 567 596 625   55 292 307 321 336 351 366 381 396 55 384 413 442 470 499 528 556 585 615   60 284 299 314 329 344 359 374 389 60 374 403 431 460 489 517 546 575 605   65 277 292 306 321 336 351 366 381 65 363 392 421 449 478 507 535 564 594   70 269 284 299 314 329 344 359 374 70 353 382 410 439 468 [phone] 583   75 261 274 289 305 319 334 348 364 75 344 372 400 429 458 487 515 544 573   80 253 266 282 296 312 327 342 356 80 335 362 390 419 448 476 [phone]

## 2020-11-12 NOTE — FLOWSHEET NOTE
rounding on PCU. Assessment: Patient is sitting up in bed having lunch. He invites  to sit for conversation. Patient is a good  and loves to share reminisces. Intervention: Engaged in conversation. Patient expressed appreciation for visit and offer of continued prayer. Plan: Chaplains are available on site or on call 24/7 for spiritual and emotional support. 11/12/20 1313   Encounter Summary   Services provided to: Patient   Referral/Consult From: [address] [place] [address] Road Visiting   (11/12/20)   Complexity of Encounter Moderate   Length of Encounter 30 minutes   Spiritual/Roman Catholic   Type Spiritual support   Assessment Calm; Approachable   Intervention Active listening;Sustaining presence/ Ministry of presence   Outcome Expressed gratitude;Engaged in conversation

## 2020-11-12 NOTE — PROGRESS NOTES
RUE Strength: WFL        Plan   Plan  Times per week: acute care OT not rquired      Goals  Short term goals  Time Frame for Short term goals: eval only       Therapy Time   Individual Concurrent Group Co-treatment   Time In 1045         Time Out 1100         Minutes 15         Timed Code Treatment Minutes: 0 Minutes       MIRANDA GAY OTR, OT

## 2020-11-12 NOTE — PLAN OF CARE
Problem: Falls - Risk of:  Goal: Will remain free from falls  Description: Will remain free from falls  Outcome: Ongoing  Goal: Absence of physical injury  Description: Absence of physical injury  Outcome: Ongoing     Problem: Breathing Pattern - Ineffective:  Goal: Ability to achieve and maintain a regular respiratory rate will improve  Description: Ability to achieve and maintain a regular respiratory rate will improve  Outcome: Ongoing     Problem: SAFETY  Goal: Free from accidental physical injury  Outcome: Ongoing  Goal: Free from intentional harm  Outcome: Ongoing     Problem: DAILY CARE  Goal: Daily care needs are met  Outcome: Ongoing     Problem: PAIN  Goal: Patient's pain/discomfort is manageable  Outcome: Ongoing

## 2020-11-12 NOTE — PROGRESS NOTES
Physical Therapy    Facility/Department: [facility]  PROGRESSIVE CARE  Initial Assessment    NAME: Megan Patel  : 1940  MRN: [id number]    Date of Service: 2020    Discharge Recommendations:  Home with assist PRN        Assessment   Prognosis: Good  Decision Making: Low Complexity  REQUIRES PT FOLLOW UP: No  Activity Tolerance  Activity Tolerance: Patient Tolerated treatment well;Patient limited by endurance       Patient Diagnosis(es): The primary encounter diagnosis was Acute on chronic systolic congestive heart failure ([address] A diagnosis of Recurrent right pleural effusion was also pertinent to this visit. has a past medical history of A-fib ([address] Abdominal pain, Abnormal PSA, Allergic rhinitis, Anemia, Arthritis, Benign prostatic hypertrophy, CAD (coronary artery disease), CHF (congestive heart failure) ([address] Depression with anxiety, Dilated cardiomyopathy ([address] GERD (gastroesophageal reflux disease), History of blood transfusion, Hypertension, Insomnia, Mass, Obstructive sleep apnea, Peripheral neuropathy, Pneumonia due to organism, Renal insufficiency, Restless leg syndrome, Type II or unspecified type diabetes mellitus without mention of complication, not stated as uncontrolled, and Ventricular tachycardia ([address] has a past surgical history that includes Gastric bypass surgery (); Tonsillectomy; ventral hernia repair (); pacemaker placement (2009); Prostate biopsy (10/28/2009); fine needle aspiration (2012); other surgical history (03/10/2014); Upper gastrointestinal endoscopy (2014); Colonoscopy (2014); Cataract removal with implant (Bilateral, 2014); Hydrocele surgery (Right); and pacemaker placement (2020).     Restrictions     Vision/Hearing  Vision: Within Functional Limits  Hearing: Within functional limits     Subjective  General  Chart Reviewed: Yes  Patient assessed for rehabilitation services?: Yes  Pain Screening  Patient Currently in Pain: Yes  Pain Assessment  Pain Assessment: 0-10  Pain Level: 6  Pain Type: Chronic pain  Pain Location: Shoulder  Pain Orientation: Left  Vital Signs  Patient Currently in Pain: Yes       Orientation  Orientation  Overall Orientation Status: Within Functional Limits  Social/Functional History  Social/Functional History  Lives With: Daughter  Type of Home: House  Home Layout: Able to Live on Main level with bedroom/bathroom, Multi-level  Home Access: Stairs to enter with rails  Bathroom Shower/Tub: Tub/Shower unit  Bathroom Toilet: Handicap height  Bathroom Equipment: Grab bars in shower, Shower chair  Home Equipment: Rolling walker, Cane  ADL Assistance: Independent  Homemaking Assistance: Independent  Homemaking Responsibilities: Yes  Other (Comment): Dtr cooks  Ambulation Assistance: Independent  Transfer Assistance: Independent  Active : Yes  Mode of Transportation: Car, SUV, Truck  Occupation: Retired  Cognition        Objective          AROM RLE (degrees)  RLE AROM: WFL  AROM LLE (degrees)  LLE AROM : WFL  Strength RLE  Strength RLE: WFL  Strength LLE  Strength LLE: WFL           Transfers  Sit to Stand: Independent  Stand to sit:  Independent  Ambulation  Ambulation?: Yes  Ambulation 1  Device: No Device  Assistance: Independent  Gait Deviations: Slow Rachell  Distance: 15'     Balance  Posture: Fair  Sitting - Static: Good  Sitting - Dynamic: Good  Standing - Static: Fair;+  Standing - Dynamic: Fair        Plan   Safety Devices  Type of devices: Call light within reach, All fall risk precautions in place    G-Code       OutComes Score                                                  AM-PAC Score             Goals          Therapy Time   Individual Concurrent Group Co-treatment   Time In 0916         Time Out 0926         Minutes 966 Davi Lang, PT

## 2020-11-12 NOTE — CARE COORDINATION
Skinny Fam is currently IP at [email or web address]]. ER F/U episode closed.

## 2020-11-12 NOTE — PROGRESS NOTES
Pt requested a sleeping aid. States he has been sitting on edge of bed most of the night and cant get comfortable. Denise Cancino NP ordered IV benadryl. This was given.  Will continue to monitor and assess

## 2020-11-12 NOTE — CONSULTS
[facility]  CONSULT NOTE                  Date:   11/12/2020  Patient name: Jazmin Valladares  Date of admission:  11/11/2020  2:53 PM  MRN:   [id number]  YOB: 1940    Reason for Admission: acute on chronic CHF    CHIEF COMPLAINT:   Worsening dyspnea     History Obtained From:  Patient and chart review     HISTORY OF PRESENT ILLNESS:    79-year-old male with known history of dilated cardiomyopathy presented worsening dyspnea on exertion, orthopnea, lower extremity edema and weight gain for 3 days. No fever or chills. Chest x-ray showed pulmonary congestion with large right pleural effusion which has been persistent since his last admission in October 2020. He has been started on IV diuresis with Bumex. Also given a dose of metolazone. Overnight he has been -2.05 L. Denies any chest pain or discomfort. No palpitations dizziness or lightheadedness. Heart rate 100-105. Past Medical History:   has a past medical history of A-fib ([address] Abdominal pain, Abnormal PSA, Allergic rhinitis, Anemia, Arthritis, Benign prostatic hypertrophy, CAD (coronary artery disease), CHF (congestive heart failure) ([address] Depression with anxiety, Dilated cardiomyopathy ([address] GERD (gastroesophageal reflux disease), History of blood transfusion, Hypertension, Insomnia, Mass, Obstructive sleep apnea, Peripheral neuropathy, Pneumonia due to organism, Renal insufficiency, Restless leg syndrome, Type II or unspecified type diabetes mellitus without mention of complication, not stated as uncontrolled, and Ventricular tachycardia ([address] Past Surgical History:   has a past surgical history that includes Gastric bypass surgery (1996); Tonsillectomy; ventral hernia repair (1997); pacemaker placement (4/2009); Prostate biopsy (10/28/2009); fine needle aspiration (12/13/2012); other surgical history (03/10/2014); Upper gastrointestinal endoscopy (04/24/2014); Colonoscopy (4/24/2014);  Cataract removal with implant (Bilateral, 4/2014); Hydrocele surgery (Right); and pacemaker placement (02/25/2020). Home Medications:    Prior to Admission medications    Medication Sig Start Date End Date Taking? Authorizing Provider   Desloratadine (CLARINEX PO) Take by mouth daily   Yes Historical Provider, MD   dapagliflozin (FARXIGA) 10 MG tablet Take 10 mg by mouth every morning   Yes Historical Provider, MD   levothyroxine (SYNTHROID) 25 MCG tablet Take 1 tablet by mouth Daily 10/22/20  Yes Roger Martinez   bumetanide (BUMEX) 2 MG tablet Take 1 tablet by mouth 2 times daily 10/21/20  Yes [facility] ALLERGY RELIEF 10 MG tablet Take 1 tablet by mouth once daily 10/19/20  Yes Zenaida Mccloud DO   gabapentin (NEURONTIN) 600 MG tablet Take 1 tablet by mouth twice daily 10/7/20 1/5/21 Yes Zenaida Mccloud DO   PARoxetine (PAXIL) 40 MG tablet TAKE 1 TABLET BY MOUTH ONCE DAILY 6/11/20  Yes Zenaida Mccloud DO   glimepiride (AMARYL) 1 MG tablet Take 1 tablet by mouth every morning 6/11/20  Yes Zenaida Mccloud DO   potassium chloride (KLOR-CON M) 20 MEQ extended release tablet Take 1 tablet by mouth 2 times daily 6/11/20  Yes Zenaida Mccloud DO   buPROPion (WELLBUTRIN XL) 300 MG extended release tablet Take 1 tablet by mouth every morning 5/29/20  Yes Zenaida Mccloud DO   HYDROcodone-acetaminophen (NORCO) 5-325 MG per tablet Take 2 tablets by mouth every morning. Yes Historical Provider, MD   pantoprazole (PROTONIX) 40 MG tablet Take 40 mg by mouth daily   Yes Historical Provider, MD   Cyanocobalamin (VITAMIN B-12) 5000 MCG TBDP Take 1 tablet by mouth daily    Yes Historical Provider, MD   Multiple Vitamins-Minerals (MULTIVITAMIN PO) Take 1 tablet by mouth daily. Yes Historical Provider, MD   calcium carbonate 600 MG TABS tablet Take 1 tablet by mouth daily. Yes Historical Provider, MD   aspirin 81 MG tablet Take 81 mg by mouth daily.    Yes Historical Provider, MD   metoprolol succinate (TOPROL XL) 100 MG extended release tablet Take 1 tablet by mouth daily  Patient not taking: Reported on 11/11/2020 10/22/20   Harlan Galvez   metOLazone (ZAROXOLYN) 2.5 MG tablet Take 1 pill every other day 30 minutes prior to first bumex dose when weight goes up  Patient not taking: Reported on 11/11/2020 10/21/20   Harlan Galvez   triamcinolone (KENALOG) 0.1 % cream Apply topically 2 times daily as needed    Historical Provider, MD   tiZANidine (ZANAFLEX) 2 MG tablet Take 2 mg by mouth nightly as needed    Historical Provider, MD   ondansetron (ZOFRAN) 4 MG tablet Take 1 tablet by mouth 3 times daily as needed for Nausea or Vomiting  Patient not taking: Reported on 11/11/2020 10/10/19   Esteban Benitez DO       Allergies:  Patient has no known allergies. Social History:   reports that he quit smoking about 41 years ago. His smoking use included cigarettes. He has a 20.00 pack-year smoking history. He has never used smokeless tobacco. He reports that he does not drink alcohol or use drugs. Family History:   Negative for early CAD    REVIEW OF SYSTEMS:    · Constitutional: there has been decline in functional capacity for last 1 week  · Eyes: No visual changes or diplopia. · ENT: No Headaches, hearing loss or vertigo. No mouth sores or sore throat. · Cardiovascular: -ve chest pain as mentioned above, positive for worsening dyspnea, orthopnea, denies palpitations or syncope  · Respiratory: Positive for dyspnea, dry cough, pleural effusion  · Gastrointestinal: No abdominal pain, appetite loss, blood in stools. No change in bowel habits. · Genitourinary: No dysuria, trouble voiding, or hematuria. · Musculoskeletal:  No gait disturbance, No weakness or joint complaints. · Integumentary: No rash or pruritis. · Neurological: No headache, weakness, numbness or tingling. No change in gait, balance, coordination. · Psychiatric: No anxiety, or depression. · Endocrine: No temperature intolerance.  No excessive thirst, fluid intake, or urination. No tremor. · Hematologic/Lymphatic: No abnormal bruising or bleeding, blood clots or swollen lymph nodes. · Allergic/Immunologic: No nasal congestion or hives. PHYSICAL EXAM:    Physical Examination:    /70   Pulse 107   Temp 96.7 °F (35.9 °C) (Tympanic)   Resp 21   Ht 6' (1.829 m)   Wt 240 lb 3.2 oz (109 kg)   SpO2 92%   BMI 32.58 kg/m²    Constitutional and General Appearance: alert, cooperative, in mild respiratory distress, on room air  HEENT: PERRL, no cervical lymphadenopathy. Normal oral mucosa  Respiratory:  · Normal excursion and expansion without use of accessory muscles  Resp Auscultation: Good respiratory effort. No for increased work of breathing. On auscultation: Severely diminished air entry bilateral bases, right greater than left, no rales  cardiovascular:  · The apical impulse is not displaced  · Heart tones are irregular. Murmurs: Not appreciated  · Jugular venous pulsation Normal  · Thre is no carotid bruit   · Peripheral pulses are symmetrical and full   Abdomen:  · No masses or tenderness  · Bowel sounds present  Extremities:  ·  No Cyanosis or Clubbing  ·  Lower extremity edema: 2+ bilateral edema   ·  Skin: Warm and dry  Neurological:  · Not done     DATA:    Diagnostics:      EKG:   V paced rhythm        Previous cardiac testing:     Echo 10/20/2020  Left ventricle is severely dilated. Left ventricular systolic function is severely reduced. Left ventricular ejection fraction 15 %. Evidence of diastolic dysfunction. Left atrium is severely dilated. Right atrium is severely dilated . Pacing/AICD lead seen in the right atrium/ventricle. Severe right ventricular dilatation with reduced systolic function. Aortic valve is sclerotic but opens well. Mild aortic insufficiency. Mild mitral valve thickening with annular calcification. Moderate to severe mitral regurgitation. Moderate tricuspid regurgitation.   Estimated right ventricular systolic pressure is 51 mmHg. Moderate pulmonary  hypertension. Aortic root is mildly dilated at 3.8 cm. IVC Increased diameter and impaired or no inspiratory variation indicating  elevated RA filling pressure (i.e. CVP) . Labs:     CBC:   Recent Labs     11/11/20  1514 11/12/20  0301   WBC 5.5 5.8   HGB 10.6* 10.3*   HCT 35.3* 34.0*    173     BMP:   Recent Labs     11/11/20  1514 11/12/20  0301    139   K 4.3 4.1   CO2 27 27   BUN 37* 36*   CREATININE 2.16* 2.20*   LABGLOM 29* 29*   GLUCOSE 83 144*     BNP: No results for input(s): BNP in the last 72 hours. PT/INR:   Recent Labs     11/11/20  1514   PROTIME 16.6*   INR 1.4     APTT:No results for input(s): APTT in the last 72 hours. CARDIAC ENZYMES:No results for input(s): CKTOTAL, CKMB, CKMBINDEX, TROPONINI in the last 72 hours. FASTING LIPID PANEL:  Lab Results   Component Value Date    HDL 25 10/20/2020    LDLCALC 61.8 12/06/2019    TRIG 68 10/20/2020     LIVER PROFILE:  Recent Labs     11/11/20  1514   AST 20   ALT 15   LABALBU 3.9         IMPRESSION:    · Acute on chronic systolic and diastolic heart failure  · Heart failure with reduced ejection fraction secondary to nonischemic cardiomyopathy  · Right pleural effusion  · ICD in situ.  S/p upgrade to CRTD in 2/20 by [facility]  · Afib- currently V paced  · Mild AI on echo 10/20  · Mod/severe MR   · Moderate TR   · CKD      RECOMMENDATIONS:  Recommend right thoracentesis due to large effusion  IV diuresis with Bumex 2 mg twice daily  Start Lopressor 25 mg twice daily  Resume aspirin 81 mg daily after thoracentesis  ICD interrogation  Hydralazine and Isordil on discharge if blood pressure tolerates        Phyllistine MD Samra, [facility]

## 2020-11-12 NOTE — PROGRESS NOTES
Hospitalist Progress Note    Patient:  Alecia Hackett     YOB: 1940    MRN: [id number]   Admit date: 11/11/2020     Acct: [de-identified]     PCP: Roby Alarcon, DO    CC--Interval History:   CHF--EF ~ 15%---elevated troponins----on diuretics---seen by Cardiology---see Cardiology note for medication adjustments especially outpatient    Right pleural effusion----thoracentesis---1.5 liters off--bloody and similar to prior which was negative for malignancy by report---breathing better and expected re-inflation discomfort    Hypoxia--down to 85% on RA when talking-----on supplemental oxygen    CKD----Stage 4    See note below             All other ROS negative except noted in HPI    Diet:  Diet NPO Effective Now Exceptions are: Sips with Meds    Medications:  Scheduled Meds:   metoprolol tartrate  25 mg Oral BID    buPROPion  300 mg Oral QAM    vitamin B-12  5,000 mcg Oral Daily    dapagliflozin  10 mg Oral QAM    gabapentin  600 mg Oral BID    glimepiride  1 mg Oral QAM    levothyroxine  25 mcg Oral Daily    metOLazone  2.5 mg Oral Daily    multivitamin  1 tablet Oral Daily    pantoprazole  40 mg Oral Daily    PARoxetine  40 mg Oral Daily    potassium chloride  20 mEq Oral BID    sodium chloride flush  10 mL Intravenous 2 times per day    bumetanide  2 mg Intravenous BID    calcium elemental  500 mg Oral Daily    cetirizine  5 mg Oral Daily     Continuous Infusions:  PRN Meds:diphenhydrAMINE, HYDROcodone-acetaminophen, tiZANidine, sodium chloride flush, acetaminophen **OR** acetaminophen, polyethylene glycol, ondansetron, sodium chloride nebulizer, albuterol    Objective:  Labs:  CBC with Differential:    Lab Results   Component Value Date    WBC 5.8 11/12/2020    RBC 3.88 11/12/2020    RBC 0-2 09/29/2019    HGB 10.3 11/12/2020    HCT 34.0 11/12/2020     11/12/2020    MCV 87.6 11/12/2020    MCH 26.5 11/12/2020    MCHC 30.3 11/12/2020    RDW 15.8 11/12/2020    NRBC 0 01/06/2019 LYMPHOPCT 8 11/12/2020    LYMPHOPCT 13.40 12/06/2019    LYMPHOPCT 13.40 12/06/2019    MONOPCT 6 11/12/2020    MONOPCT 3.543 12/06/2019    MONOPCT 3.543 12/06/2019    EOSPCT 7.144 12/06/2019    EOSPCT 7.144 12/06/2019    BASOPCT 1 11/12/2020    BASOPCT 1.704 12/06/2019    BASOPCT 1.704 12/06/2019    MONOSABS 0.35 11/12/2020    MONOSABS 0.2000 12/06/2019    MONOSABS 0.2000 12/06/2019    LYMPHSABS 0.46 11/12/2020    LYMPHSABS 0.7562 12/06/2019    LYMPHSABS 0.7562 12/06/2019    EOSABS 0.29 11/12/2020    EOSABS 0.4032 12/06/2019    EOSABS 0.4032 12/06/2019    BASOSABS 0.06 11/12/2020    DIFFTYPE NOT REPORTED 11/12/2020     BMP:    Lab Results   Component Value Date     11/12/2020    K 4.1 11/12/2020     11/12/2020    CO2 27 11/12/2020    BUN 36 11/12/2020    LABALBU 3.9 11/11/2020    LABALBU 3.6 09/30/2019    CREATININE 2.20 11/12/2020    CREATININE 1.4 12/18/2018    CALCIUM 8.3 11/12/2020    GFRAA 35 11/12/2020    LABGLOM 29 11/12/2020    GLUCOSE 144 11/12/2020    GLUCOSE 122 09/30/2019           Physical Exam:  Vitals: /70   Pulse 107   Temp 96.7 °F (35.9 °C) (Tympanic)   Resp 21   Ht 6' (1.829 m)   Wt 240 lb 3.2 oz (109 kg)   SpO2 92%   BMI 32.58 kg/m²   24 hour intake/output:    Intake/Output Summary (Last 24 hours) at 11/12/2020 0908  Last data filed at 11/12/2020 0250  Gross per 24 hour   Intake --   Output 2050 ml   Net -2050 ml     Last 3 weights: Wt Readings from Last 3 Encounters:   11/12/20 240 lb 3.2 oz (109 kg)   11/11/20 242 lb (109.8 kg)   11/02/20 232 lb (105.2 kg)     HEENT: O2 NC----, Normocephalic and Atraumatic  Neck: Supple, No Masses, Tenderness, Nodularity and No Lymphadenopathy  Chest/Lungs: Poor Air Movement and Distant Breath Sounds  Cardiac: Regular Rate and Rhythm   [pacemaker]  GI/Abdomen:  Bowel Sounds Present and Soft, Non-tender, without Guarding or Rebound Tenderness  : Not examined  EXT/Skin: No Cyanosis, No Clubbing and Edema Present---pitting to lower back  Neuro: alert---generalzied weakness--- and No Localizing Signs/Symptoms      Assessment:    Active Problems:    Pleural effusion due to CHF (congestive heart failure) (HCC)  Resolved Problems:    * No resolved hospital problems.  Malika Barretty.         80  WM  Kayden Young, ; [facility], Cardiology--[place];  DC Cardiology---[facility]]                                                        DNR-CCA   PACEMAKER-AICD--NO MRIs    HEPARIN subcutaneously--hold  SCDs  SUPPLEMENTAL OXYGEN      COVID-19---NEGATIVE     Anti-infectives:  none    POD _____ right thoracentesis---1.5 liters off----11.12.2020  CHF---acute-on-chronic systolic------11.11.2020---due to dilated cardiomyopathy  Right pleural effusion----recurrent----11.11.2020              CXR----11.11.2020---cardiomegaly with central vascular congestion---                        moderate-to-large right pleural effusion--increased from prior imaging studies             EKG---11.11.2020--ventricular-paced rhythm--104                Pro-BNP----137782----48.11.2020            Chronically elevated troponins--11.11.2020----68-----60--on 11.2.2020  Chronic respiratory failure--due to CHF--pleural effusion----cardiomyopathy    Prior:    POD _____ right pleural effusion thoracentesis---10.20.2020  CHF--acute-on-chronic systolic---10.19.2020--due to dilated cardiomyopathy        2D ECHO----10.20.2020--LA severely dilated---LV severely dilated---                          severely reduced LVSF--RA severely dilated---severe RV                          dilatation with reduced RVSF--pacemaker lead present----                          MAC MV thickening--moderate-to-severe MR--EWA but                          opens well--moderate TR----RVSP ~ 51 mm Hg---moderate                            pulmonary hypertension--AR mildly dilated at 3.8 cm---                           IVC increased diameter and impair or no inspiratory variation---- TR--RVSP ~ 30 mm Hg--                           pacemaker--AICD wire present--Grade 2 DD---                           LVEF ~ 30%   Hypertension  Diabetes Mellitus Type 2  Hypothyroidism   Obstructive sleep apnea  CKD---Stage 3  GERD  Restless leg syndrome  Peripheral neuropathy  Depression--anxiety  Obesity  Tobacco abuse--quit---5.1.1979   PMH:      BPH--abnormal PSA, diarrhea, 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,                  osteoarthritis--knees--shoulder, allergic rhinitis, insomnia, chest                  pain--1.24.2013---chest wall pain component--costochondritis,                  pancreatic biopsy--non-malignant by report, sinusitis, Influenza                 A--2016, acute-on-chronic respiratory distress---2016, chest                  pain---2016, hyperkalemia--2016, RLL--pneumonia---12.6.2019--                  acute--cardiomegaly--small right basilar effusion--right basilar                 infiltrate  PSH:       gastric bypass 1996--[place]--[place], appendectomy,                  cholecystectomy, re-attachment thumb, pancreatic biopsy--2012,                  tonsillectomy--childhood, B9 prostate biopsy--2009, central                  hernia repair--1997, EGD-colonoscopy--2014, bilateral                  cataract--IOL--2014    Allergies:  NKDA    Plan:  1. CHF--on IV diuretics---watch BP  2. Right-sided pleural effusion---sp thoracentesis----11.12.2020--Hernandez  3. Elevated flat-peak troponins---seen by Cardiology  4. Lower BP--may require IVF and/or temporarily decrease diuretics  5. Supplemental oxygen  6. CKD--Stage 4---watch fluid-electrolyte balance  7.   See orders     Electronically signed by Rebecca Jimenez on 11/12/2020 at 9:08 AM    Hospitalist

## 2020-11-13 NOTE — PLAN OF CARE
Problem: Falls - Risk of:  Goal: Will remain free from falls  Description: Will remain free from falls  Outcome: Ongoing  Goal: Absence of physical injury  Description: Absence of physical injury  Outcome: Ongoing     Problem: Breathing Pattern - Ineffective:  Goal: Ability to achieve and maintain a regular respiratory rate will improve  Description: Ability to achieve and maintain a regular respiratory rate will improve  Outcome: Ongoing     Problem: SAFETY  Goal: Free from accidental physical injury  Outcome: Ongoing  Goal: Free from intentional harm  Outcome: Ongoing     Problem: DAILY CARE  Goal: Daily care needs are met  Outcome: Ongoing     Problem: PAIN  Goal: Patient's pain/discomfort is manageable  Outcome: Ongoing     Problem: Pain:  Goal: Pain level will decrease  Description: Pain level will decrease  Outcome: Ongoing  Goal: Control of acute pain  Description: Control of acute pain  Outcome: Ongoing  Goal: Control of chronic pain  Description: Control of chronic pain  Outcome: Ongoing

## 2020-11-13 NOTE — PROGRESS NOTES
3.543 12/06/2019    EOSPCT 7.144 12/06/2019    EOSPCT 7.144 12/06/2019    BASOPCT 1 11/13/2020    BASOPCT 1.704 12/06/2019    BASOPCT 1.704 12/06/2019    MONOSABS 0.58 11/13/2020    MONOSABS 0.2000 12/06/2019    MONOSABS 0.2000 12/06/2019    LYMPHSABS 0.58 11/13/2020    LYMPHSABS 0.7562 12/06/2019    LYMPHSABS 0.7562 12/06/2019    EOSABS 0.15 11/13/2020    EOSABS 0.4032 12/06/2019    EOSABS 0.4032 12/06/2019    BASOSABS 0.07 11/13/2020    DIFFTYPE NOT REPORTED 11/13/2020     CMP:    Lab Results   Component Value Date     11/13/2020    K 4.8 11/13/2020     11/13/2020    CO2 24 11/13/2020    BUN 49 11/13/2020    CREATININE 2.99 11/13/2020    CREATININE 1.4 12/18/2018    GFRAA 25 11/13/2020    AGRATIO 1.3 09/30/2019    LABGLOM 20 11/13/2020    GLUCOSE 121 11/13/2020    GLUCOSE 122 09/30/2019    PROT 6.8 11/11/2020    PROT 5.8 01/05/2019    LABALBU 3.9 11/11/2020    LABALBU 3.6 09/30/2019    CALCIUM 8.2 11/13/2020    BILITOT 0.42 11/11/2020    BILITOT NEGATIVE 09/29/2019    ALKPHOS 129 11/11/2020    AST 20 11/11/2020    ALT 15 11/11/2020           Physical Exam:  Vitals: BP 93/64   Pulse 83   Temp 97 °F (36.1 °C) (Tympanic)   Resp 16   Ht 6' (1.829 m)   Wt 240 lb 3.2 oz (109 kg)   SpO2 90%   BMI 32.58 kg/m²   24 hour intake/output:    Intake/Output Summary (Last 24 hours) at 11/13/2020 0842  Last data filed at 11/12/2020 0900  Gross per 24 hour   Intake --   Output 1500 ml   Net -1500 ml     Last 3 weights: Wt Readings from Last 3 Encounters:   11/12/20 240 lb 3.2 oz (109 kg)   11/11/20 242 lb (109.8 kg)   11/02/20 232 lb (105.2 kg)     HEENT: Normocephalic and Atraumatic  Neck: Supple, No Masses, Tenderness, Nodularity and No Lymphadenopathy  Chest/Lungs: Prolonged Expiratory Phase, Poor Air Movement and Distant Breath Sounds  Cardiac: Regular Rate and Rhythm  GI/Abdomen:  Bowel Sounds Present and Soft, Non-tender, without Guarding or Rebound Tenderness  : Not examined  EXT/Skin: No Cyanosis, No Clubbing and Edema Present----still significant pitting edema above knees but improved--no longer shiny   Neuro: alert---- and No Localizing Signs/Symptoms      Assessment:    Active Problems:    Pleural effusion due to CHF (congestive heart failure) ([facility])  Resolved Problems:    * No resolved hospital problems.  Mayda Kill.         80  WM  Mendoza Hernández, FÉLIX; Jc Ornelas, Cardiology--ovalle;  DC Cardiology---T[facility]]                                                        DNR-CCA   PACEMAKER-AICD--NO MRIs    HEPARIN subcutaneously--hold  SCDs  SUPPLEMENTAL OXYGEN      COVID-19---NEGATIVE     Anti-infectives:  none    POD _____ right thoracentesis---1.5 liters off----11.12.2020  CHF---acute-on-chronic systolic------11.11.2020---due to dilated cardiomyopathy  Right pleural effusion----recurrent----11.11.2020              CXR----11.11.2020---cardiomegaly with central vascular congestion---                        moderate-to-large right pleural effusion--increased from prior imaging studies             EKG---11.11.2020--ventricular-paced rhythm--104                Pro-BNP----705454----28.11.2020            Chronically elevated troponins--11.11.2020----68-----60--on 11.2.2020  Chronic respiratory failure--due to CHF--pleural effusion----cardiomyopathy    Prior:    POD _____ right pleural effusion thoracentesis---10.20.2020  CHF--acute-on-chronic systolic---10.19.2020--due to dilated cardiomyopathy        2D ECHO----10.20.2020--LA severely dilated---LV severely dilated---                          severely reduced LVSF--RA severely dilated---severe RV                          dilatation with reduced RVSF--pacemaker lead present----                          MAC MV thickening--moderate-to-severe MR--EWA but                          opens well--moderate TR----RVSP ~ 51 mm Hg---moderate                            pulmonary hypertension--AR mildly dilated at 3.8 cm---                           IVC increased diameter and impair or no inspiratory variation----                           DD----LVEF ~ 15%  Large right pleural effusion---10.19.2020        CXR--10.20.2020--large right pleural effusion        EKG---10.19.2020---ventricular paced rhythm--96        Left should x-ray---10.19.2020----severe glenohumeral OA--calcific tendinosis            Acute-on-chronic systolic---1.5.2019---due to cardiomyopathy            Acute-on-chronic systolic---12.6. [facility] 2019--due to dilated cardiomyopathy           2D ECHO---12.6.2019-----severely dilated                         LA---moderate LVH--severely reduced LVSF--RA                         dilatation--RV dilatation--NRVSF---EWA but opens                        well---mild AI---mild-to-moderate MR--mild TR--                        RVSP ~ 30 mm Hg---LVEF ~ 25%           Acute-on-chronic systolic--3. 2.2019---1.18.2016            DUS---BLE---3. 1.2019--no DVT--limited by BLE edema            EKG---3. 1.2019--#2---ventricular paced rhythm--92            EKG---3. 1.2019--#1---ventricular paced rhythm--89    Severe dilated cardiomyopathy           2D ECHO----3. 4.2019---severe TRES----severely dilated LA--                    LV upper limits normal--severely reduced LVSF--severely                    dilated RA--RV dilatation--NRVSF--trivial TR---mild EWA--                    trivial TR---RVSP ~ 38 mm Hg---aortic root mildly dilated                     4.1 cm---Grade II moderate DD----LVEF ~ 25%           2-D ECHO--12.10.2008--mild-to-moderate AI--mild MI-TI--                     LVEF ~ 20-25%           PACEMAKER--AICD              CRT-ICD pulse generator replacement--intraoperative                           defibrillation threshold testing--3.10.2014               Atrial-biventricular pacing--sensing--2009                Arrhythmia--ventricular tachycardia                         PVCs--bigeminy--underlying prior rhythm  BLE edema   ASCVD            MI ruled out---1.19.2016            2D ECHO---1.18.2016--TRES--moderate LVH--                          NRVSF--mild AR--ns TR--RVSP ~ 30 mm Hg--                           pacemaker--AICD wire present--Grade 2 DD---                           LVEF ~ 30%   Hypertension  Diabetes Mellitus Type 2  Hypothyroidism   Obstructive sleep apnea  CKD---Stage 3  GERD  Restless leg syndrome  Peripheral neuropathy  Depression--anxiety  Obesity  Tobacco abuse--quit---5.1.1979   PMH:      BPH--abnormal PSA, diarrhea, 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,                  osteoarthritis--knees--shoulder, allergic rhinitis, insomnia, chest                  pain--1.24.2013---chest wall pain component--costochondritis,                  pancreatic biopsy--non-malignant by report, sinusitis, Influenza                 A--2016, acute-on-chronic respiratory distress---2016, chest                  pain---2016, hyperkalemia--2016, RLL--pneumonia---12.6.2019--                  acute--cardiomegaly--small right basilar effusion--right basilar                 infiltrate  PSH:       gastric bypass 1996--[place]--[place], appendectomy,                  cholecystectomy, re-attachment thumb, pancreatic biopsy--2012,                  tonsillectomy--childhood, B9 prostate biopsy--2009, central                  hernia repair--1997, EGD-colonoscopy--2014, bilateral                  cataract--IOL--2014    Allergies:  NKDA            Plan:  1. CHF--dc Zaroxolyn-----hold Bumex--11.13.2020 and reassess in AM--11.14.2020 due to rise in creatinine  2. Supplemental oxygen  3. Small inclusion cyst mid-right back--#11--drained--minimal  4. Daily weight---IO--fluid restriction  5.    See orders     Electronically signed by Stephanie Thibodeaux on 11/13/2020 at 8:42 AM    Hospitalist

## 2020-11-13 NOTE — FLOWSHEET NOTE
[place] on PCU. Assessment: Patient expressed feelings that he will not live much longer. Not expressing an attitude of despair but of acceptance. He spoke of some regrets and of some areas where he needed to work on forgiveness for things long past but also of accomplishments and good things in his life. Intervention: Engaged in conversation. Provided a listening presence and prayed with him and offered him a blessing Patient expressed appreciation for visit and offer of continued prayer. Plan: Chaplains are available on site or on call 24/7 for spiritual and emotional support. 11/13/20 1308   Encounter Summary   Services provided to: Patient   Referral/Consult From: [place]   Continue Visiting   (11/13/20)   Complexity of Encounter Moderate   Length of Encounter 30 minutes   Spiritual/Baptist   Type Spiritual support   Assessment Approachable;Guilt; Shame   Intervention Active listening;Explored feelings, thoughts, concerns;Prayer; Facilitated forgiveness   Outcome Expressed gratitude;Engaged in conversation; Shared life review;Expressed regrets

## 2020-11-14 PROBLEM — E11.649 DIABETIC HYPOGLYCEMIA (HCC): Status: ACTIVE | Noted: 2020-01-01

## 2020-11-14 PROBLEM — I95.9 HYPOTENSION: Status: ACTIVE | Noted: 2020-01-01

## 2020-11-14 PROBLEM — N17.9 ACUTE ON CHRONIC KIDNEY FAILURE (HCC): Status: ACTIVE | Noted: 2020-01-01

## 2020-11-14 PROBLEM — E87.5 HYPERKALEMIA: Status: ACTIVE | Noted: 2020-01-01

## 2020-11-14 PROBLEM — I34.0 NONRHEUMATIC MITRAL VALVE REGURGITATION: Status: ACTIVE | Noted: 2020-01-01

## 2020-11-14 PROBLEM — N18.9 ACUTE ON CHRONIC KIDNEY FAILURE (HCC): Status: ACTIVE | Noted: 2020-01-01

## 2020-11-14 NOTE — PROGRESS NOTES
Hospitalist Progress Note  11/14/2020 3:15 PM  Subjective:   Admit Date: 11/11/2020  PCP: Paige Chapman DO  Interval History: Spoke with Dr Adriana Almazan, 2 daughters and patient. EF 15%- but mod-severe MR, marginal BP, and deteriorating kidney fxn. Discussed transfer to [place] for cardio and Nephro consults. Discussed dobutamine vs milranone-- however-- the BP has come up and that does not appear to play role currently. Prognosis is grave. They do not want to pursue hospice yet. He is comfortable- not dyspneic- no CP    Diet: DIET CARDIAC; Carb Control: 4 carb choices (60 gms)/meal; Daily Fluid Restriction: 1500 ml  Medications:   Scheduled Meds:   insulin lispro  0-12 Units Subcutaneous TID WC    insulin lispro  0-6 Units Subcutaneous Nightly    metoprolol tartrate  25 mg Oral BID    buPROPion  300 mg Oral QAM    vitamin B-12  5,000 mcg Oral Daily    [Held by provider] dapagliflozin  10 mg Oral QAM    gabapentin  600 mg Oral BID    [Held by provider] glimepiride  1 mg Oral QAM    levothyroxine  25 mcg Oral Daily    multivitamin  1 tablet Oral Daily    pantoprazole  40 mg Oral Daily    PARoxetine  40 mg Oral Daily    [Held by provider] potassium chloride  20 mEq Oral BID    sodium chloride flush  10 mL Intravenous 2 times per day    calcium elemental  500 mg Oral Daily    cetirizine  5 mg Oral Daily     Continuous Infusions:   dextrose       CBC:   Recent Labs     11/12/20  0301 11/13/20  0547 11/14/20  0530   WBC 5.8 7.3 11.2   HGB 10.3* 11.6* 11.3*    225 174     BMP:    Recent Labs     11/13/20  0547 11/14/20  0530 11/14/20  1250    136 134*   K 4.8 5.8* 5.6*    100 96*   CO2 24 17* 21   BUN 49* 62* 69*   CREATININE 2.99* 4.07* 4.48*   GLUCOSE 121* 48* 130*     Hepatic: No results for input(s): AST, ALT, ALB, BILITOT, ALKPHOS in the last 72 hours. Troponin: No results for input(s): TROPONINI in the last 72 hours.   BNP: No results for input(s): BNP in the last 72 hours. Lipids: No results for input(s): CHOL, HDL in the last 72 hours. Invalid input(s): LDLCALCU  INR:   Recent Labs     11/12/20  0907   INR 1.4       Objective:   Vitals: /86   Pulse 88   Temp 97.2 °F (36.2 °C) (Tympanic)   Resp 20   Ht 6' (1.829 m)   Wt 240 lb 3.2 oz (109 kg)   SpO2 96%   BMI 32.58 kg/m²     Intake/Output Summary (Last 24 hours) at 11/14/2020 1515  Last data filed at 11/14/2020 0802  Gross per 24 hour   Intake 330 ml   Output --   Net 330 ml     Patient Vitals for the past 96 hrs (Last 3 readings):   Weight   11/12/20 0552 240 lb 3.2 oz (109 kg)   11/11/20 1826 242 lb 11.2 oz (110.1 kg)   11/11/20 1459 242 lb (109.8 kg)     General appearance: alert and cooperative with exam  HEENT: Head: Normocephalic, no lesions, without obvious abnormality. Neck: no adenopathy, no carotid bruit, no JVD, supple, symmetrical, trachea midline and thyroid not enlarged, symmetric, no tenderness/mass/nodules  Lungs: rales bibasilar  Heart: regular rate and rhythm, S1, S2 normal, no murmur, click, rub or gallop  Abdomen: soft, non-tender; bowel sounds normal; no masses,  no organomegaly  Extremities: edema 3+  Neurologic: Mental status: Alert, oriented, thought content appropriate  Somnolent early-- but awake in PM  Assessment and Plan:         Acute on chronic systolic Congestive heart failure (HCC)    Pleural effusion due to CHF (congestive heart failure) (HCC)    Nonrheumatic mitral valve regurgitation    Acute on chronic kidney failure (HCC)    Hyperkalemia    Hypotension  Diuretics held yesterday-- with worsening kidney fxn. Was on Bumex and zaoxolyn before that. Lopressor is new on admit added by cardio. Had been on coreg-- that was stopped a month ago.  K+ may be hemolysis- but have stopped K.  BP stable today-- reassess in AM.     Diabetic hypoglycemia ([facility] [address]  Stop oral meds          Miah Thao MD  [facility] Hospitalist

## 2020-11-14 NOTE — FLOWSHEET NOTE
was made aware by PCU nurse that patient may need spiritual care. Assessment: Patient sitting in chair with head hanging low. Patient presents to be depressed and is trying to process his prognosis. Patient is reflective about his life. Patient has three daughters. Patient is not sure at this point what brings him comfort and \"hopes there is a Heaven. \"     Intervention:  provided empathetic presence.  explored patient's thoughts and feelings regarding facing end of life while also exploring patient's memories and his legacy for his family.  prayed with patient. Outcome: Patient shared in life review and thanked  for sitting with him. Patient was still hanging head staring at floor when  exited room. Plan: Chaplains will remain available to offer spiritual and emotional support as needed. 11/14/20 1519   Encounter Summary   Services provided to: Patient   Referral/Consult From: Nurse   Support System Children   Continue Visiting   (11/14/20)   Complexity of Encounter High   Length of Encounter 45 minutes   Spiritual Assessment Completed Yes   Spiritual/Cheondoism   Type Spiritual struggle   Assessment Anticipatory grief   Intervention Explored feelings, thoughts, concerns;Prayer;Sustaining presence/ Ministry of presence   Outcome Expressed feelings/needs/concerns; Shared reminiscences; Expressed regrets   Electronically signed by Sophia Iraheta on 11/14/2020 at 3:30 PM

## 2020-11-14 NOTE — FLOWSHEET NOTE
630 Lab called with a critical lab glucose of 45. Patient was resting in bed with his eyes closed. Patient awaken and given an orange juice with 2 sugar packs in it at 0633. 0640 glucose was 63. Repeat glucose was 40. NP notified, of glucose and K of 5.8, Bun and creat. Orders received. Repeat glucose at 0700 is 71.

## 2020-11-15 PROBLEM — K72.90: Status: ACTIVE | Noted: 2020-01-01

## 2020-11-15 PROBLEM — I25.5 ISCHEMIC CARDIOMYOPATHY: Status: ACTIVE | Noted: 2020-01-01

## 2020-11-15 PROBLEM — Q87.89: Status: ACTIVE | Noted: 2020-01-01

## 2020-11-15 NOTE — FLOWSHEET NOTE
Reviewed patient labs. BUN 77, creat 5.33, K+ 5.5. CNP had been notified by house supervisor. Call placed to patient's dtr Flaca. Explained to her about the low bp--60/40 and the lab values and that the kidneys are not functioning. End of life options discussed. Family to come in and see patient and decide what they want. Did let her know that the CNP had ordered fluids and medication for the patient to help raise the blood pressure.

## 2020-11-15 NOTE — FLOWSHEET NOTE
Patient more lethargic, responds to name to verbal and manual stimuli, having periods of apnea. Color pale. +4 edema to legs to up on buttock. DBS to lungs, unable to hear left lobe. No urine output so far today. Moaning at intervals. Said his hip hurts. Morphine 2mg IV given for discomfort. Patient removed O2 and will leave off. Hospice for [facility] here to assess patient.

## 2020-11-15 NOTE — FLOWSHEET NOTE
Called to patient room by family. Family requesting that dobutamine and fluids be stopped so patient's life will not be prolonged per wishes of the patient. Family also requesting that Hospice be consulted and patient be a [facility]. Contacted CNP [address] and orders received. CNP phoned facility and requested that family sign a [facility] form.  Patient's dtr Dary See signed [facility], as she takes care of his medical.

## 2020-11-15 NOTE — FLOWSHEET NOTE
11/15/20 0509   Encounter Summary   Services provided to: Patient and family together   Referral/Consult From: Nurse   Support System Family members   Place [facility]. [employer]   Contact Hoahaoism Yes   Continue Visiting Yes   Complexity of Encounter Low   Length of Encounter 15 minutes   Spiritual Assessment Completed Yes   Spiritual/Spiritism   Type Spiritual support  (Patient POA has signed a DNR for hospice care.)   Assessment Tearful;Unable to respond   Intervention Active listening;Explored coping resources;Prayer;Sustaining presence/ Ministry of presence;Grief care   Outcome Expressed gratitude;Grieving    responded to nurse's request for a  for patient who was going into hospice care to provide support for the family. The patient was not responsive. The family was tearful. The family said that the patient was an active member of [address] [place] [address] in [facility].  provided a ministry of presence, explored support resources, prayed with the family and anointed the patient. The family expressed gratitude and seemed comforted by 's presence. Chaplains to remain available to offer spiritual and emotional support as needed.     Electronically signed by Nicole Pat on 11/15/2020 at 5:18 AM

## 2020-11-15 NOTE — FLOWSHEET NOTE
Patients blood pressure is 60/46. NP paged through perfect [employer] and called and left a message. Notified of BP and abnormal critical labs. Orders received.

## 2020-11-16 NOTE — DISCHARGE SUMMARY
[address]                 [address] [place] [id number]                               DISCHARGE SUMMARY    PATIENT NAME: Tracie Lea                    :        1940  MED REC NO:   [id number]                             ROOM:       0201  ACCOUNT NO:   [de-identified]                           ADMIT DATE: 2020  PROVIDER:     Cecilia Valdes                  DISCHARGE DATE: 11/15/2020    DATE OF ADMISSION:  2020    DISCHARGE DIAGNOSES:  1. Severe ischemic congestive heart failure. 2.  Progressive end-stage kidney failure, acute. Please see H and P from 2020, for complete details of HPI, past  medical history, family history, and admission physical exam.    HOSPITAL COURSE:  The patient was admitted to [facility] on  2020. He was treated for the severe heart failure. His ejection  fraction was 15%, but he had moderate-to-severe mitral regurgitation, so  his effective ejection fraction was worse than that. Unfortunately, he  experienced deterioration of his kidney function. Diuretics were  stopped. The patient had thoracentesis done as well early on in the  hospitalization of about 1.5 liters. He had had recurrent pleural  effusion related to the CHF. Unfortunately, despite stopping the  diuretics, his kidney function continued to deteriorate. I met with  daughters and the patient and we discussed transfer to [facility]. I  discussed the patient's case with Dr. Layton Bumpers, the cardiologist, in [facility]  as well. The family and the patient decided they would prefer not to go  to [facility] on Saturday. The patient continued to deteriorate; however,  Saturday evening into , and on  morning, they made the  decision to go ahead and enroll in hospice. Accordingly, we were not  going to pursue aggressive treatment from that point forward.   We were  going to focus on keeping the patient comfortable for his end-of-life  care and hospice admission was arranged on the morning of 11/15/2020.         Mello Lorenzana    D: 11/15/2020 14:14:24       T: 11/15/2020 16:38:12     [email or web address]  Job#: [id number]     Doc#: [id number]

## 2020-11-16 NOTE — DISCHARGE SUMMARY
Rafa [address]                 [address] [place] [address]                               DISCHARGE SUMMARY    PATIENT NAME: Swathi Escobar                    :        1940  MED REC NO:   [id number]                             ROOM:       0201  ACCOUNT NO:   [de-identified]                           ADMIT DATE: 11/15/2020  PROVIDER:     Fauzia Young                  DISCHARGE DATE: 11/15/2020    DISCHARGE DIAGNOSES:  1. Severe ischemic cardiomyopathy with an ejection fraction of 15% with  moderate-to-severe mitral regurgitation. 2.  End-stage renal disease. HOSPITAL COURSE:  The patient was admitted to hospice early in the day  on 11/15/2020, due to the severe cardiomyopathy and severe decrease in  ejection fraction along with severe deteriorating kidney disease, kidney  failure, and hypotension. The patient had a deteriorating course  throughout the day, hypotensive and increasingly interacting with his  family to a lesser degree becoming unresponsive and around 3:15 p.m.,  the patient . I auscultated his heart and lungs for 60 seconds  without heart tones and without breath sounds. He was pronounced dead  at 3:15 p.m. on 11/15/2020.         Art Celina    D: 11/15/2020 15:45:17       T: 11/15/2020 21:40:21     [email or web address]  Job#: [id number]     Doc#: [id number]    CC:

## 2020-11-17 ENCOUNTER — TELEPHONE (OUTPATIENT)
Dept: FAMILY MEDICINE CLINIC | Age: 80
End: 2020-11-17

## 2020-11-17 RX ORDER — ACETAMINOPHEN 650 MG/1
650 SUPPOSITORY RECTAL EVERY 4 HOURS PRN
COMMUNITY

## 2020-11-17 RX ORDER — MORPHINE SULFATE 10 MG/ML
6 INJECTION, SOLUTION INTRAMUSCULAR; INTRAVENOUS EVERY 4 HOURS PRN
COMMUNITY

## 2020-11-17 RX ORDER — GLYCOPYRROLATE 0.2 MG/ML
0.1 INJECTION INTRAMUSCULAR; INTRAVENOUS ONCE
COMMUNITY

## 2020-11-17 RX ORDER — LORAZEPAM 2 MG/ML
1 INJECTION INTRAMUSCULAR EVERY 6 HOURS PRN
COMMUNITY

## 2020-11-17 RX ORDER — LORAZEPAM 4 MG/ML
4 INJECTION, SOLUTION INTRAMUSCULAR; INTRAVENOUS EVERY 4 HOURS PRN
COMMUNITY

## 2020-11-17 RX ORDER — BISACODYL 10 MG
10 SUPPOSITORY, RECTAL RECTAL DAILY
COMMUNITY

## 2020-11-17 RX ORDER — MORPHINE SULFATE 4 MG/ML
4 INJECTION, SOLUTION INTRAMUSCULAR; INTRAVENOUS
COMMUNITY

## 2020-11-17 NOTE — TELEPHONE ENCOUNTER
Hospice home health plan of care reviewed and certification completed 11/17/2020 on patient for service dates 11/15/2020 to 2/12/2020. Verified current medications. Physician time spent on activities to coordinate service, documenting, medical decision making, review of reports/treatment plans/test results is 20 minutes.

## 2020-11-23 LAB
CULTURE: NORMAL
Lab: NORMAL
SPECIMEN DESCRIPTION: NORMAL

## 2020-12-07 LAB
CULTURE: NORMAL
DIRECT EXAM: NORMAL
Lab: NORMAL
SPECIMEN DESCRIPTION: NORMAL

## 2023-10-10 NOTE — PROGRESS NOTES
Physical Therapy    Facility/Department: [facility]  PROGRESSIVE CARE  Initial Assessment    NAME: Therese Garcia  : 1940  MRN: [id number]    Date of Service: 10/20/2020    Discharge Recommendations:  Home independently, Home with assist PRN        Assessment   Prognosis: Good  Decision Making: Low Complexity  No Skilled PT: Independent with functional mobility   REQUIRES PT FOLLOW UP: No  Activity Tolerance  Activity Tolerance: Patient Tolerated treatment well       Patient Diagnosis(es): The primary encounter diagnosis was Pleural effusion. A diagnosis of Diastolic congestive heart failure, unspecified HF chronicity (HCC) was also pertinent to this visit. has a past medical history of A-fib ([address] Abdominal pain, Abnormal PSA, Allergic rhinitis, Anemia, Arthritis, Benign prostatic hypertrophy, CAD (coronary artery disease), CHF (congestive heart failure) ([address] Depression with anxiety, Dilated cardiomyopathy ([address] GERD (gastroesophageal reflux disease), History of blood transfusion, Hypertension, Insomnia, Mass, Obstructive sleep apnea, Peripheral neuropathy, Pneumonia due to organism, Renal insufficiency, Restless leg syndrome, Type II or unspecified type diabetes mellitus without mention of complication, not stated as uncontrolled, and Ventricular tachycardia ([address] has a past surgical history that includes Gastric bypass surgery (); Tonsillectomy; ventral hernia repair (); pacemaker placement (2009); Prostate biopsy (10/28/2009); fine needle aspiration (2012); other surgical history (03/10/2014); Upper gastrointestinal endoscopy (2014); Colonoscopy (2014); Cataract removal with implant (Bilateral, 2014); Hydrocele surgery (Right); and pacemaker placement (2020).     Restrictions     Vision/Hearing        Subjective  General  Chart Reviewed: Yes  Patient assessed for rehabilitation services?: Yes  Response To Previous Treatment: Not applicable  Family / Caregiver Present: No  Follows Commands: Within Functional Limits  Pain Screening  Patient Currently in Pain: No  Vital Signs  Patient Currently in Pain: No       Orientation  Orientation  Overall Orientation Status: Within Normal Limits  Social/Functional History  Social/Functional History  Lives With: Daughter  Type of Home: House  Receives Help From: Family  ADL Assistance: Independent  Homemaking Assistance: Independent  Ambulation Assistance: Independent  Transfer Assistance: Independent  Active : Yes  Mode of Transportation: Car  Type of occupation: Still actively farming  Cognition        Objective          PROM RLE (degrees)  RLE PROM: WFL  PROM LLE (degrees)  LLE PROM: WFL  Strength RLE  Strength RLE: WNL  Strength LLE  Strength LLE: WNL        Bed mobility  Rolling to Left: Independent  Rolling to Right: Independent  Supine to Sit: Independent  Sit to Supine: Independent  Scooting: Independent  Transfers  Sit to Stand: Independent  Stand to sit:  Independent  Ambulation  Ambulation?: Yes  Ambulation 1  Device: No Device  Assistance: Independent     Balance  Sitting - Static: Good  Sitting - Dynamic: Good  Standing - Static: Good  Standing - Dynamic: Fair        Plan   Safety Devices  Type of devices: Left in bed, Call light within reach    G-Code       OutComes Score                                                  AM-PAC Score             Goals  Short term goals  Time Frame for Short term goals: 1 day  Short term goal 1: Assess functional status       Therapy Time   Individual Concurrent Group Co-treatment   Time In 0955         Time Out 1005         Minutes 10                 Dianne Warren PT no